# Patient Record
Sex: FEMALE | Race: WHITE | NOT HISPANIC OR LATINO | Employment: OTHER | URBAN - NONMETROPOLITAN AREA
[De-identification: names, ages, dates, MRNs, and addresses within clinical notes are randomized per-mention and may not be internally consistent; named-entity substitution may affect disease eponyms.]

---

## 2017-01-11 ENCOUNTER — OFFICE VISIT (OUTPATIENT)
Dept: SURGERY | Facility: CLINIC | Age: 77
End: 2017-01-11

## 2017-01-11 VITALS
DIASTOLIC BLOOD PRESSURE: 76 MMHG | BODY MASS INDEX: 24.11 KG/M2 | WEIGHT: 150 LBS | HEIGHT: 66 IN | HEART RATE: 72 BPM | SYSTOLIC BLOOD PRESSURE: 110 MMHG

## 2017-01-11 DIAGNOSIS — K21.9 GASTROESOPHAGEAL REFLUX DISEASE WITHOUT ESOPHAGITIS: Primary | ICD-10-CM

## 2017-01-11 PROCEDURE — 99212 OFFICE O/P EST SF 10 MIN: CPT | Performed by: SURGERY

## 2017-01-11 NOTE — PROGRESS NOTES
Subjective   Tessa Fisher is a 76 y.o. female  is here today for follow-up.         Tessa Fisher is a 76 y.o. female here for follow-up after EGD.  The patient has a known large hiatal hernia with esophagitis.  She had stopped PPI therapy and esophagitis and symptoms recurred.  She will require lifelong PPI therapy.  Risks and benefits of surgery been discussed with patient and she is not willing to proceed with paraesophageal hernia repair which is reasonable at this time.            Tessa was seen today for egd post-op.    Diagnoses and all orders for this visit:    Gastroesophageal reflux disease without esophagitis        Assessment     76-year-old female with esophagitis and paraesophageal hernia.  She responded well to PPI therapy which has been resumed.  She will follow-up in 3 months.

## 2017-01-11 NOTE — MR AVS SNAPSHOT
"Tessa Fisher   1/11/2017 3:40 PM   Office Visit    Dept Phone:  [phone]   Encounter #:  [id number]    Provider:  Arun Phelps MD   Department:  [facility] GENERAL SURGERY                Your Full Care Plan              Your Updated Medication List          This list is accurate as of: 1/11/17  4:17 PM.  Always use your most recent med list.                pantoprazole 40 MG EC tablet   Commonly known as:  PROTONIX   Take 1 tablet by mouth Daily.               You Were Diagnosed With        Codes Comments    Gastroesophageal reflux disease without esophagitis    -  Primary ICD-10-CM: K21.9  ICD-9-CM: 530.81       Instructions     None    Patient Instructions History      Upcoming Appointments     Visit Type Date Time Department    POST-OP 1/11/2017  3:40 PM [facility]    FOLLOW UP 4/12/2017  1:10 PM [facility]      MyChart Signup     Our records indicate that you have declined [employer] signup. If you would like to sign up for [employer], please email [email or web address] or call [phone] to obtain an activation code.             Other Info from Your Visit           Your Appointments     Apr 12, 2017  1:10 PM EDT   Follow Up with Arun Phelps MD   [facility] GENERAL SURGERY (--)    [address] [place] [id number] [phone]           Arrive 15 minutes prior to appointment.              Allergies     No Known Allergies      Reason for Visit     EGD POST-OP           Vital Signs     Blood Pressure Pulse Height Weight Body Mass Index Smoking Status    110/76 72 66\" (167.6 cm) 150 lb (68 kg) 24.21 kg/m2 Former Smoker      Problems and Diagnoses Noted     Acid reflux disease        "

## 2017-02-08 ENCOUNTER — OFFICE VISIT (OUTPATIENT)
Dept: SURGERY | Facility: CLINIC | Age: 77
End: 2017-02-08

## 2017-02-08 VITALS
HEART RATE: 84 BPM | SYSTOLIC BLOOD PRESSURE: 115 MMHG | HEIGHT: 66 IN | WEIGHT: 150 LBS | DIASTOLIC BLOOD PRESSURE: 80 MMHG | BODY MASS INDEX: 24.11 KG/M2

## 2017-02-08 DIAGNOSIS — M79.604 RIGHT LEG PAIN: Primary | ICD-10-CM

## 2017-02-08 PROCEDURE — 99212 OFFICE O/P EST SF 10 MIN: CPT | Performed by: SURGERY

## 2017-02-08 NOTE — PROGRESS NOTES
Subjective   Tessa Fisher is a 76 y.o. female  is here today for follow-up.         Tessa Fisher is a 76 y.o. female here for follow-up regarding reflux that is improved with PPI therapy.  She does not however she twisted her right knee the other day and is having continued pain in the muscles of the lateral aspect of the knee.  There is no knee instability on examination.  The anterior and posterior drawer signs are negative for instability.  The patient has bilateral lower extremity edema that is stable.        Tessa was seen today for right lower extremity swelling.    Diagnoses and all orders for this visit:    Right leg pain        Assessment     76-year-old female with right lower [place] to pain.  She is been advised to elevate the lower extremity when not ambulating and initiate anti-inflammatory therapy.  She'll follow-up in 2 weeks.

## 2017-02-22 ENCOUNTER — OFFICE VISIT (OUTPATIENT)
Dept: SURGERY | Facility: CLINIC | Age: 77
End: 2017-02-22

## 2017-02-22 VITALS — HEIGHT: 66 IN | BODY MASS INDEX: 24.11 KG/M2 | WEIGHT: 150 LBS

## 2017-02-22 DIAGNOSIS — K21.9 GASTROESOPHAGEAL REFLUX DISEASE WITHOUT ESOPHAGITIS: Primary | ICD-10-CM

## 2017-02-22 DIAGNOSIS — M79.604 RIGHT LEG PAIN: ICD-10-CM

## 2017-02-22 PROCEDURE — 99212 OFFICE O/P EST SF 10 MIN: CPT | Performed by: SURGERY

## 2017-02-22 RX ORDER — PANTOPRAZOLE SODIUM 40 MG/1
40 TABLET, DELAYED RELEASE ORAL DAILY
Qty: 30 TABLET | Refills: 1 | Status: SHIPPED | OUTPATIENT
Start: 2017-02-22 | End: 2017-04-12 | Stop reason: ALTCHOICE

## 2017-02-22 NOTE — PROGRESS NOTES
Subjective   Tessa Fisher is a 76 y.o. female  is here today for follow-up.         Tessa Fisher is a 76 y.o. female here for follow up regarding her right leg pain and swelling.  The swelling has improved somewhat.  I believe this is likely due to venous stasis as the swelling is worse through the day but resolves at night when her legs are elevated.  The patient right lateral knee pain has not worsened but is still present.  There is no evidence of underlying injury and I have offered the patient referral to orthopedic surgery and she would like to defer at this time.    General Appearance:  awake, alert, oriented, in no acute distress  Lungs:  Normal expansion.  Clear to auscultation.  No rales, rhonchi, or wheezing.  Heart:  Heart regular rate and rhythm  Abdomen:  Soft, non-tender, normal bowel sounds; no bruits, organomegaly or masses.  Extremities: Extremities warm to touch, pink, with no edema.  Musculoskeletal:  Pain and swelling improved of the right lower extremity but the patient still complains of discomfort.  No gross deformities.  Anterior and posterior drawer signs negative.          Tessa was seen today for right lower extremity swelling.    Diagnoses and all orders for this visit:    Gastroesophageal reflux disease without esophagitis    Right leg pain    Other orders  -     pantoprazole (PROTONIX) 40 MG EC tablet; Take 1 tablet by mouth Daily.        Assessment     Tessa Fisher is a 76 y.o. female with improvement of her right leg pain.  She will continue rest at this time and follow-up as needed.  I will refill her PPI prescription.

## 2017-04-12 ENCOUNTER — OFFICE VISIT (OUTPATIENT)
Dept: SURGERY | Facility: CLINIC | Age: 77
End: 2017-04-12

## 2017-04-12 VITALS — BODY MASS INDEX: 24.11 KG/M2 | HEIGHT: 66 IN | WEIGHT: 150 LBS

## 2017-04-12 DIAGNOSIS — K21.9 GASTROESOPHAGEAL REFLUX DISEASE WITHOUT ESOPHAGITIS: Primary | ICD-10-CM

## 2017-04-12 PROCEDURE — 99213 OFFICE O/P EST LOW 20 MIN: CPT | Performed by: SURGERY

## 2017-04-12 RX ORDER — FAMOTIDINE 20 MG/1
20 TABLET, FILM COATED ORAL 2 TIMES DAILY
Qty: 60 TABLET | Refills: 1 | Status: ON HOLD | OUTPATIENT
Start: 2017-04-12 | End: 2017-04-16

## 2017-04-12 NOTE — PROGRESS NOTES
Subjective   Tessa Fisher is a 76 y.o. female  is here today for follow-up.         Tessa Fisher is a 76 y.o. female here for follow up for GERD.  She is improved with Protonix therapy but states the medication is causing swelling in her hands and feet.  She'll be converted to Pepcid twice daily and follow-up in 3 months.  With regards to her lower extremity she has no further pain of the right knee.  On examination she has mild right lower extremity edema and mild left lower extremity edema.  Pedal pulses are palpable.      Tessa was seen today for gerd follow up.    Diagnoses and all orders for this visit:    Gastroesophageal reflux disease without esophagitis    Other orders  -     famotidine (PEPCID) 20 MG tablet; Take 1 tablet by mouth 2 (Two) Times a Day.        Assessment     Tessa Fisher is a 76 y.o. female with GERD that has improved with medical management.  She does not like the side effects of the Protonix and she will be converted to Pepcid twice a day.  She will follow-up in 3 months.

## 2017-04-16 ENCOUNTER — APPOINTMENT (OUTPATIENT)
Dept: GENERAL RADIOLOGY | Facility: HOSPITAL | Age: 77
End: 2017-04-16

## 2017-04-16 ENCOUNTER — HOSPITAL ENCOUNTER (OUTPATIENT)
Facility: HOSPITAL | Age: 77
Setting detail: OBSERVATION
Discharge: HOME | End: 2017-04-18
Attending: EMERGENCY MEDICINE | Admitting: INTERNAL MEDICINE

## 2017-04-16 ENCOUNTER — APPOINTMENT (OUTPATIENT)
Dept: CT IMAGING | Facility: HOSPITAL | Age: 77
End: 2017-04-16

## 2017-04-16 DIAGNOSIS — R55 NEAR SYNCOPE: Primary | ICD-10-CM

## 2017-04-16 PROBLEM — R53.1 GENERALIZED WEAKNESS: Status: ACTIVE | Noted: 2017-04-16

## 2017-04-16 LAB
ALBUMIN SERPL-MCNC: 4.4 G/DL (ref 3.4–4.8)
ALBUMIN/GLOB SERPL: 1.5 G/DL (ref 1.5–2.5)
ALP SERPL-CCNC: 87 U/L (ref 35–104)
ALT SERPL W P-5'-P-CCNC: 28 U/L (ref 10–36)
AMPHET+METHAMPHET UR QL: NEGATIVE
ANION GAP SERPL CALCULATED.3IONS-SCNC: 5.1 MMOL/L (ref 3.6–11.2)
APTT PPP: 23.8 SECONDS (ref 24.4–31)
AST SERPL-CCNC: 29 U/L (ref 10–30)
BACTERIA UR QL AUTO: ABNORMAL /HPF
BARBITURATES UR QL SCN: NEGATIVE
BASOPHILS # BLD AUTO: 0.04 10*3/MM3 (ref 0–0.3)
BASOPHILS NFR BLD AUTO: 0.4 % (ref 0–2)
BENZODIAZ UR QL SCN: NEGATIVE
BILIRUB SERPL-MCNC: 0.5 MG/DL (ref 0.2–1.8)
BILIRUB UR QL STRIP: NEGATIVE
BNP SERPL-MCNC: 36 PG/ML (ref 0–100)
BUN BLD-MCNC: 16 MG/DL (ref 7–21)
BUN/CREAT SERPL: 23.5 (ref 7–25)
CALCIUM SPEC-SCNC: 9.9 MG/DL (ref 7.7–10)
CANNABINOIDS SERPL QL: NEGATIVE
CHLORIDE SERPL-SCNC: 106 MMOL/L (ref 99–112)
CK MB SERPL-CCNC: 2.61 NG/ML (ref 0–5)
CK MB SERPL-CCNC: 2.73 NG/ML (ref 0–5)
CK MB SERPL-RTO: 2.9 % (ref 0–3)
CK MB SERPL-RTO: 3 % (ref 0–3)
CK SERPL-CCNC: 86 U/L (ref 24–173)
CK SERPL-CCNC: 95 U/L (ref 24–173)
CLARITY UR: CLEAR
CO2 SERPL-SCNC: 28.9 MMOL/L (ref 24.3–31.9)
COCAINE UR QL: NEGATIVE
COLOR UR: YELLOW
CREAT BLD-MCNC: 0.68 MG/DL (ref 0.43–1.29)
CRP SERPL-MCNC: <0.5 MG/DL (ref 0–0.99)
D-LACTATE SERPL-SCNC: 1 MMOL/L (ref 0.5–2)
DEPRECATED RDW RBC AUTO: 45.1 FL (ref 37–54)
DEVELOPER EXPIRATION DATE: NORMAL
DEVELOPER LOT NUMBER: NORMAL
EOSINOPHIL # BLD AUTO: 0.03 10*3/MM3 (ref 0–0.7)
EOSINOPHIL NFR BLD AUTO: 0.3 % (ref 0–7)
ERYTHROCYTE [DISTWIDTH] IN BLOOD BY AUTOMATED COUNT: 13.7 % (ref 11.5–14.5)
EXPIRATION DATE: NORMAL
FECAL OCCULT BLOOD SCREEN, POC: NEGATIVE
GFR SERPL CREATININE-BSD FRML MDRD: 84 ML/MIN/1.73
GLOBULIN UR ELPH-MCNC: 3 GM/DL
GLUCOSE BLD-MCNC: 115 MG/DL (ref 70–110)
GLUCOSE UR STRIP-MCNC: NEGATIVE MG/DL
HBA1C MFR BLD: 5.7 % (ref 4.5–5.7)
HCT VFR BLD AUTO: 41.1 % (ref 37–47)
HGB BLD-MCNC: 13.2 G/DL (ref 12–16)
HGB UR QL STRIP.AUTO: NEGATIVE
HYALINE CASTS UR QL AUTO: ABNORMAL /LPF
IMM GRANULOCYTES # BLD: 0.02 10*3/MM3 (ref 0–0.03)
IMM GRANULOCYTES NFR BLD: 0.2 % (ref 0–0.5)
INR PPP: 0.94 (ref 0.8–1.1)
KETONES UR QL STRIP: NEGATIVE
LEUKOCYTE ESTERASE UR QL STRIP.AUTO: ABNORMAL
LYMPHOCYTES # BLD AUTO: 1.13 10*3/MM3 (ref 1–3)
LYMPHOCYTES NFR BLD AUTO: 12.3 % (ref 16–46)
Lab: NORMAL
MAGNESIUM SERPL-MCNC: 2.2 MG/DL (ref 1.7–2.6)
MCH RBC QN AUTO: 29.5 PG (ref 27–33)
MCHC RBC AUTO-ENTMCNC: 32.1 G/DL (ref 33–37)
MCV RBC AUTO: 91.9 FL (ref 80–94)
METHADONE UR QL SCN: NEGATIVE
MONOCYTES # BLD AUTO: 0.45 10*3/MM3 (ref 0.1–0.9)
MONOCYTES NFR BLD AUTO: 4.9 % (ref 0–12)
MYOGLOBIN SERPL-MCNC: 49 NG/ML (ref 0–109)
MYOGLOBIN SERPL-MCNC: 53 NG/ML (ref 0–109)
NEGATIVE CONTROL: NEGATIVE
NEUTROPHILS # BLD AUTO: 7.5 10*3/MM3 (ref 1.4–6.5)
NEUTROPHILS NFR BLD AUTO: 81.9 % (ref 40–75)
NITRITE UR QL STRIP: NEGATIVE
OPIATES UR QL: NEGATIVE
OSMOLALITY SERPL CALC.SUM OF ELEC: 281.5 MOSM/KG (ref 273–305)
PCP UR QL SCN: NEGATIVE
PH UR STRIP.AUTO: 8 [PH] (ref 5–8)
PLATELET # BLD AUTO: 224 10*3/MM3 (ref 130–400)
PMV BLD AUTO: 9.9 FL (ref 6–10)
POSITIVE CONTROL: POSITIVE
POTASSIUM BLD-SCNC: 3.7 MMOL/L (ref 3.5–5.3)
PROPOXYPH UR QL: NEGATIVE
PROT SERPL-MCNC: 7.4 G/DL (ref 6–8)
PROT UR QL STRIP: NEGATIVE
PROTHROMBIN TIME: 10.4 SECONDS (ref 9.8–11.9)
RBC # BLD AUTO: 4.47 10*6/MM3 (ref 4.2–5.4)
RBC # UR: ABNORMAL /HPF
REF LAB TEST METHOD: ABNORMAL
SODIUM BLD-SCNC: 140 MMOL/L (ref 135–153)
SP GR UR STRIP: 1.01 (ref 1–1.03)
SQUAMOUS #/AREA URNS HPF: ABNORMAL /HPF
T4 FREE SERPL-MCNC: 1.1 NG/DL (ref 0.89–1.76)
TROPONIN I SERPL-MCNC: <0.006 NG/ML
TSH SERPL DL<=0.05 MIU/L-ACNC: 3.22 MIU/ML (ref 0.55–4.78)
UROBILINOGEN UR QL STRIP: ABNORMAL
WBC NRBC COR # BLD: 9.17 10*3/MM3 (ref 4.5–12.5)
WBC UR QL AUTO: ABNORMAL /HPF

## 2017-04-16 PROCEDURE — 80307 DRUG TEST PRSMV CHEM ANLYZR: CPT | Performed by: PHYSICIAN ASSISTANT

## 2017-04-16 PROCEDURE — G0378 HOSPITAL OBSERVATION PER HR: HCPCS

## 2017-04-16 PROCEDURE — 84439 ASSAY OF FREE THYROXINE: CPT | Performed by: EMERGENCY MEDICINE

## 2017-04-16 PROCEDURE — 96361 HYDRATE IV INFUSION ADD-ON: CPT

## 2017-04-16 PROCEDURE — 71010 HC CHEST PA OR AP: CPT

## 2017-04-16 PROCEDURE — 83605 ASSAY OF LACTIC ACID: CPT | Performed by: PHYSICIAN ASSISTANT

## 2017-04-16 PROCEDURE — 85025 COMPLETE CBC W/AUTO DIFF WBC: CPT | Performed by: EMERGENCY MEDICINE

## 2017-04-16 PROCEDURE — 71010 XR CHEST 1 VW: CPT | Performed by: RADIOLOGY

## 2017-04-16 PROCEDURE — 96372 THER/PROPH/DIAG INJ SC/IM: CPT

## 2017-04-16 PROCEDURE — 85610 PROTHROMBIN TIME: CPT | Performed by: EMERGENCY MEDICINE

## 2017-04-16 PROCEDURE — 82553 CREATINE MB FRACTION: CPT | Performed by: PHYSICIAN ASSISTANT

## 2017-04-16 PROCEDURE — 84484 ASSAY OF TROPONIN QUANT: CPT | Performed by: EMERGENCY MEDICINE

## 2017-04-16 PROCEDURE — 84484 ASSAY OF TROPONIN QUANT: CPT | Performed by: PHYSICIAN ASSISTANT

## 2017-04-16 PROCEDURE — 87040 BLOOD CULTURE FOR BACTERIA: CPT | Performed by: PHYSICIAN ASSISTANT

## 2017-04-16 PROCEDURE — 25010000002 HEPARIN (PORCINE) PER 1000 UNITS: Performed by: PHYSICIAN ASSISTANT

## 2017-04-16 PROCEDURE — 82550 ASSAY OF CK (CPK): CPT | Performed by: PHYSICIAN ASSISTANT

## 2017-04-16 PROCEDURE — 83735 ASSAY OF MAGNESIUM: CPT | Performed by: PHYSICIAN ASSISTANT

## 2017-04-16 PROCEDURE — 99220 PR INITIAL OBSERVATION CARE/DAY 70 MINUTES: CPT | Performed by: INTERNAL MEDICINE

## 2017-04-16 PROCEDURE — 85730 THROMBOPLASTIN TIME PARTIAL: CPT | Performed by: EMERGENCY MEDICINE

## 2017-04-16 PROCEDURE — 83874 ASSAY OF MYOGLOBIN: CPT | Performed by: PHYSICIAN ASSISTANT

## 2017-04-16 PROCEDURE — 93005 ELECTROCARDIOGRAM TRACING: CPT | Performed by: EMERGENCY MEDICINE

## 2017-04-16 PROCEDURE — 83036 HEMOGLOBIN GLYCOSYLATED A1C: CPT | Performed by: PHYSICIAN ASSISTANT

## 2017-04-16 PROCEDURE — 80053 COMPREHEN METABOLIC PANEL: CPT | Performed by: EMERGENCY MEDICINE

## 2017-04-16 PROCEDURE — 70450 CT HEAD/BRAIN W/O DYE: CPT

## 2017-04-16 PROCEDURE — 86140 C-REACTIVE PROTEIN: CPT | Performed by: PHYSICIAN ASSISTANT

## 2017-04-16 PROCEDURE — 83880 ASSAY OF NATRIURETIC PEPTIDE: CPT | Performed by: EMERGENCY MEDICINE

## 2017-04-16 PROCEDURE — 99284 EMERGENCY DEPT VISIT MOD MDM: CPT

## 2017-04-16 PROCEDURE — 84443 ASSAY THYROID STIM HORMONE: CPT | Performed by: EMERGENCY MEDICINE

## 2017-04-16 PROCEDURE — 81001 URINALYSIS AUTO W/SCOPE: CPT | Performed by: EMERGENCY MEDICINE

## 2017-04-16 PROCEDURE — 70450 CT HEAD/BRAIN W/O DYE: CPT | Performed by: RADIOLOGY

## 2017-04-16 RX ORDER — PANTOPRAZOLE SODIUM 40 MG/1
40 TABLET, DELAYED RELEASE ORAL
Status: DISCONTINUED | OUTPATIENT
Start: 2017-04-16 | End: 2017-04-16

## 2017-04-16 RX ORDER — PANTOPRAZOLE SODIUM 40 MG/1
40 TABLET, DELAYED RELEASE ORAL 2 TIMES DAILY
COMMUNITY
End: 2017-10-06

## 2017-04-16 RX ORDER — FAMOTIDINE 20 MG/1
20 TABLET, FILM COATED ORAL EVERY 12 HOURS SCHEDULED
Status: DISCONTINUED | OUTPATIENT
Start: 2017-04-16 | End: 2017-04-18 | Stop reason: HOSPADM

## 2017-04-16 RX ORDER — SODIUM CHLORIDE 0.9 % (FLUSH) 0.9 %
1-10 SYRINGE (ML) INJECTION AS NEEDED
Status: DISCONTINUED | OUTPATIENT
Start: 2017-04-16 | End: 2017-04-18 | Stop reason: HOSPADM

## 2017-04-16 RX ORDER — NITROGLYCERIN 0.4 MG/1
0.4 TABLET SUBLINGUAL
Status: DISCONTINUED | OUTPATIENT
Start: 2017-04-16 | End: 2017-04-18 | Stop reason: HOSPADM

## 2017-04-16 RX ORDER — HEPARIN SODIUM 5000 [USP'U]/ML
5000 INJECTION, SOLUTION INTRAVENOUS; SUBCUTANEOUS EVERY 12 HOURS SCHEDULED
Status: DISCONTINUED | OUTPATIENT
Start: 2017-04-16 | End: 2017-04-18 | Stop reason: HOSPADM

## 2017-04-16 RX ADMIN — FAMOTIDINE 20 MG: 20 TABLET, FILM COATED ORAL at 20:03

## 2017-04-16 RX ADMIN — SODIUM CHLORIDE 1000 ML: 9 INJECTION, SOLUTION INTRAVENOUS at 10:51

## 2017-04-16 RX ADMIN — HEPARIN SODIUM 5000 UNITS: 5000 INJECTION, SOLUTION INTRAVENOUS; SUBCUTANEOUS at 20:03

## 2017-04-16 NOTE — H&P
[facility] HOSPITALIST HISTORY AND PHYSICAL    Patient Identification:  Name:  Tessa Fisher  Age:  76 y.o.  Sex:  female  :  1940  MRN:  [id number]   Visit Number:  [id number]  Primary Care Physician:  Arun Phelps MD     I have seen the patient in conjunction with Kiley Turner PA-C and I agree with the following statements:    Chief complaint:   Weakness, acute onset    History of presenting illness:   Patient is a 76 y.o. female with past medical history significant for GERD that presented to the [facility] Emergency Department for evaluation of acute onset of weakness. Patient states that onset of symptoms was approximately 7 AM this morning upon waking. Patient states that when she woke up this morning she was severely weak and could not get out of bed. She states it took her almost an hour to get out of her bed to sit on the bedside. She states it took her another hour to get from her bed to her living room where she had to sit and rest. She did report diaphoresis upon waking up and states that she was drenched in sweat. She states that yesterday she was fine and had no health complaints. She states she is very independent. She states she works part time at least three days per week as a  and is on her feet all day. She states that when she is not at work she works at her house in her yard and mows and weed eats her lawn by herself. She states that she does have lower extremity edema, right worse than left that is chronic from being on her feet a lot. She states she wears compression stockings occasionally. She states that the only medical problem she has is GERD and hiatal hernia. She states that her abdomen swells as the day progresses, which has been chronic. She states that she takes acid reflux medicine, which has never resolved her symptoms. She denies shortness of breath. Denies chest pain and/or pressure. She denies nausea, vomiting, or diarrhea.  She denies urinary symptoms. She denies ill contacts. Patient does report that she has recently been taken off of Protonix as it made her hands and feet swell, she has been switched to Pepcid, but has not started the new medication.  Patient's daughter is at bedside and states that this morning the patient was sluggish and speech was slowed compared to the patient's baseline.  She has noticed clear rhinorrhea for 4 weeks that she thought was sinus drainage; she had never had problems with allergies before.    Upon arrival to the ED, vitals were temp 94.7 F, HR 55, RR 16, /78, and SpO2 100% on room air. Initial troponin negative BNP WNL. CMP with glucose 115, otherwise WNL. Hemoglobin A1C 5.70. TSH and free T4 WNL. CBC with neutrophil % of 81.9%, otherwise WNL. Chest Xray with no evidence of consolidations or effusions. Head CT negative for any acute changes. EKG with sinus bradycardia and LBBB, QTc prolongation of 463 m/s.     Patient has been admitted to the telemetry unit for further evaluation and treatment.   ---------------------------------------------------------------------------------------------------------------------   Review of Systems   Constitutional: Positive for chills and diaphoresis. Negative for fever.   HENT: Positive for postnasal drip and rhinorrhea. Negative for congestion, sinus pressure, sneezing and sore throat.    Eyes: Positive for discharge. Negative for pain and visual disturbance.   Respiratory: Negative for apnea, cough, chest tightness, shortness of breath and wheezing.    Cardiovascular: Positive for leg swelling. Negative for chest pain and palpitations.   Gastrointestinal: Positive for abdominal distention. Negative for abdominal pain, blood in stool, constipation, diarrhea, nausea and vomiting.   Endocrine: Positive for cold intolerance. Negative for polyphagia and polyuria.   Genitourinary: Negative for dysuria, flank pain, frequency, hematuria and urgency.    Musculoskeletal: Negative for back pain and myalgias.   Skin: Negative for color change, pallor, rash and wound.   Allergic/Immunologic: Negative for environmental allergies and immunocompromised state.   Neurological: Positive for weakness. Negative for dizziness, tremors, seizures, syncope, facial asymmetry, speech difficulty, light-headedness, numbness and headaches.   Hematological: Negative for adenopathy. Does not bruise/bleed easily.   Psychiatric/Behavioral: Negative for confusion and decreased concentration. The patient is not nervous/anxious.     ---------------------------------------------------------------------------------------------------------------------   Past Medical History:   Diagnosis Date   • GERD (gastroesophageal reflux disease)    • Hiatal hernia    • LBBB (left bundle branch block)    • Melanoma      Past Surgical History:   Procedure Laterality Date   • APPENDECTOMY     • COLONOSCOPY  1990   • ENDOSCOPY N/A 12/30/2016    Procedure: ESOPHAGOGASTRODUODENOSCOPY WITH ANESTHESIA;  Surgeon: Arun Phelps MD;  Location: [facility];  Service:    • ESOPHAGOGASTRECTOMY     • SKIN CANCER EXCISION       Family History   Problem Relation Age of Onset   • Cancer Mother      Melanoma   • Diabetes Neg Hx    • Heart disease Neg Hx    • Hypertension Neg Hx      Social History   • Marital status:      Social History Main Topics   • Smoking status: Former Smoker     Packs/day: 3.00     Years: 25.00     Quit date: 1975   • Smokeless tobacco: None   • Alcohol use No   • Drug use: No   ---------------------------------------------------------------------------------------------------------------------   Allergies:  Review of patient's allergies indicates no known allergies.  ---------------------------------------------------------------------------------------------------------------------   Prior to Admission Medications     Prescriptions Last Dose Informant Patient Reported? Taking?     famotidine (PEPCID) 20 MG tablet   No No    Take 1 tablet by mouth 2 (Two) Times a Day.      ---------------------------------------------------------------------------------------------------------------------   Vital Signs:  Temp:  [94.7 °F (34.8 °C)-97.9 °F (36.6 °C)] 97.5 °F (36.4 °C)  Heart Rate:  [55-76] 60  Resp:  [16-20] 20  BP: (118-149)/(61-91) 132/67  Last 3 weights    04/16/17  1018 04/16/17  1524   Weight: 160 lb (72.6 kg) 164 lb 6.4 oz (74.6 kg)     Body mass index is 26.53 kg/(m^2).     SpO2 Readings from Last 3 Encounters:   04/16/17 95%   12/30/16 98%   ---------------------------------------------------------------------------------------------------------------------   Physical Exam:  Constitutional:  Well-developed and well-nourished.  No respiratory distress.      HENT:  Head: Normocephalic and atraumatic.  Mouth:  Moist mucous membranes.    Eyes:  Conjunctivae and EOM are normal.  Pupils are equal, round, and reactive to light.  No scleral icterus.  Neck:  Neck supple.  No JVD present.    Cardiovascular:  Normal rate, regular rhythm and normal heart sounds with no murmur.  Pulmonary/Chest:  No respiratory distress, no wheezes, no crackles, with normal breath sounds and good air movement.  Abdominal:  Soft.  Bowel sounds are normal.  No distension. Mild tenderness to palpation of the left upper quadrant and epigastric region without guarding.    Musculoskeletal:  No edema, no tenderness, and no deformity.  No red or swollen joints anywhere.    Neurological:  Alert and oriented to person, place, and time.  No cranial nerve deficit.  No tongue deviation.  No facial droop.  No slurred speech.   Skin:  Skin is warm and dry.  No rash noted.  No pallor.   Psychiatric:  Normal mood and affect.  Behavior is normal.  Judgment and thought content normal.   Peripheral vascular:  Strong pulses on all 4 extremities. Chronic venous stasis changes noted bilaterally, right lower extremity for edematous than the  left, no erythema, warmth, or cellulitis noted. No pitting edema noted.   Genitourinary: Making urine, no sands catheter in place.   ---------------------------------------------------------------------------------------------------------------------  EKG:  I have personally looked at the EKG and read the final cardiology report.       Telemetry:  Sinus 60s, LBBB  I have personally reviewed the EKG/Telemetry strip  ---------------------------------------------------------------------------------------------------------------------   Results from last 7 days  Lab Units 04/16/17  1047   WBC 10*3/mm3 9.17   HEMOGLOBIN g/dL 13.2   HEMATOCRIT % 41.1   MCV fL 91.9   MCHC g/dL 32.1*   PLATELETS 10*3/mm3 224   INR  0.94     Results from last 7 days  Lab Units 04/16/17  1047   SODIUM mmol/L 140   POTASSIUM mmol/L 3.7   CHLORIDE mmol/L 106   TOTAL CO2 mmol/L 28.9   BUN mg/dL 16   CREATININE mg/dL 0.68   EGFR IF NONAFRICN AM mL/min/1.73 84   CALCIUM mg/dL 9.9   GLUCOSE mg/dL 115*   ALBUMIN g/dL 4.40   BILIRUBIN mg/dL 0.5   ALK PHOS U/L 87   AST (SGOT) U/L 29   ALT (SGPT) U/L 28   Estimated Creatinine Clearance: 62.7 mL/min (by C-G formula based on Cr of 0.68).    Results from last 7 days  Lab Units 04/16/17  1047   TROPONIN I ng/mL <0.006       Lab Results   Component Value Date    HGBA1C 5.70 04/16/2017     Lab Results   Component Value Date    TSH 3.224 04/16/2017    FREET4 1.10 04/16/2017            ---------------------------------------------------------------------------------------------------------------------  Imaging Results (last 7 days)     Procedure Component Value Units Date/Time    XR Chest 1 View [[id number]] Collected:  04/16/17 1240     Updated:  04/16/17 1240    FINDINGS:    The cardiac silhouette is normal in size and shape. The lungs are mildly hyperinflated but otherwise clear. There are no pleural effusions or signs of heart failure.    Impression:       No evidence of active disease in the chest.         CT Head Without Contrast [[id number]]   Updated:  04/16/17 2831      I have personally reviewed the above radiology results.   ---------------------------------------------------------------------------------------------------------------------  Assessment and Plan:  -Symptomatic bradycardia of unknown etiology  -Generalized weakness, acute onset   -GERD  -Hiatal hernia   -Mild hyperglycemia with hemoglobin A1C 5.7  -Left bundle branch block, old (first diagnosed in 2008)  -Former smoker     Patient has been admitted to the telemetry floor for further evaluation and treatment. Continue to trend cardiac isoenzymes X 3. Echo ordered. Lipid panel ordered for in the morning. Carotid dopplers ordered bilaterally. I have ordered CRP, lactic acid, and blood cultures X 2 to rule out any source of infection as cause of the patient's symptoms. Continue monitoring the patient on telemetry. Repeat CBC and CMP in the morning.  I will order orthostatic vital signs.    I do not know what the underlying disease process is, but a viral illness could explain all of her symptoms.  The psychomotor slowness this morning could be due to a seizure but this would not explain the low temperature.     DVT Prophylaxis: SQ Heparin     The patient is considered to be a high risk patient due to: Bradycardia, acute onset of generalized weakness, former smoker.     I have discussed the patient's assessment and plan with the patient.     RHONDA Briones  04/16/17  4:25 PM     Ash Ly MD  04/16/17  7:36 PM  ---------------------------------------------------------------------------------------------------------------------

## 2017-04-16 NOTE — ED PROVIDER NOTES
Subjective   Patient is a 76 y.o. female presenting with weakness.   Weakness - Generalized   Severity:  Moderate  Onset quality:  Gradual  Duration:  3 hours  Timing:  Constant  Progression:  Unchanged  Chronicity:  New  Context: not urinary tract infection    Context comment:  This patient upon awaking this morning felt very weak all over she said she could barely get out of bed and could hardly walk she is so weak she does not have any fever although she did have an episode of diaphoresis.  No chest pain or shortness of breath.  Relieved by:  Nothing  Worsened by:  Standing and activity  Ineffective treatments:  None tried  Associated symptoms: fever (patient felt she may have had a fever because she did have an episode of diaphoresis)    Associated symptoms: no abdominal pain, no arthralgias, no chest pain, no diarrhea, no dizziness, no dysuria, no myalgias, no nausea, no shortness of breath and no vomiting        Review of Systems   Constitutional: Positive for fever (patient felt she may have had a fever because she did have an episode of diaphoresis). Negative for activity change.   HENT: Negative.  Negative for trouble swallowing.    Eyes: Negative for discharge.   Respiratory: Negative for shortness of breath, wheezing and stridor.    Cardiovascular: Negative for chest pain.   Gastrointestinal: Negative for abdominal pain, diarrhea, nausea and vomiting.   Genitourinary: Negative for difficulty urinating, dysuria and flank pain.   Musculoskeletal: Negative for arthralgias and myalgias.   Skin: Negative for rash and wound.   Neurological: Negative for dizziness.   Psychiatric/Behavioral: Negative for agitation.   All other systems reviewed and are negative.      Past Medical History:   Diagnosis Date   • Acid reflux    • Melanoma        No Known Allergies    Past Surgical History:   Procedure Laterality Date   • APPENDECTOMY     • COLONOSCOPY  1990   • ENDOSCOPY N/A 12/30/2016    Procedure:  ESOPHAGOGASTRODUODENOSCOPY WITH ANESTHESIA;  Surgeon: Arun Phelps MD;  Location: [facility] OR;  Service:    • ESOPHAGOGASTRECTOMY     • SKIN CANCER EXCISION         Family History   Problem Relation Age of Onset   • Cancer Mother      Melanoma   • Diabetes Neg Hx    • Heart disease Neg Hx    • Hypertension Neg Hx        Social History     Social History   • Marital status:      Spouse name: N/A   • Number of children: N/A   • Years of education: N/A     Social History Main Topics   • Smoking status: Former Smoker     Packs/day: 3.00     Years: 25.00     Quit date: 1975   • Smokeless tobacco: None   • Alcohol use No   • Drug use: No   • Sexual activity: Defer     Other Topics Concern   • None     Social History Narrative           Objective   Physical Exam   Constitutional: She is oriented to person, place, and time. She appears well-developed and well-nourished.   HENT:   Head: Normocephalic.   Right Ear: External ear normal.   Left Ear: External ear normal.   Nose: Nose normal.   Mouth/Throat: Oropharynx is clear and moist.   Eyes: EOM are normal. Pupils are equal, round, and reactive to light.   Neck: Normal range of motion. Neck supple. No thyromegaly present.   Cardiovascular: Normal rate, regular rhythm, normal heart sounds and intact distal pulses.    Pulmonary/Chest: Effort normal and breath sounds normal. No respiratory distress. She has no wheezes. She has no rales.   Abdominal: Soft. She exhibits no distension. There is no tenderness. There is no rebound and no guarding.   Musculoskeletal: Normal range of motion. She exhibits no edema or tenderness.   Neurological: She is alert and oriented to person, place, and time. She has normal reflexes. No cranial nerve deficit.   Skin: Skin is warm and dry. No rash noted.   Psychiatric: She has a normal mood and affect. Her behavior is normal. Judgment and thought content normal.   Nursing note and vitals reviewed.      Procedures         ED Course  ED  Course   Comment By Time   This patient had a near-syncope episode that lasted for a while she was always in fear of passing out.  Going to go ahead and admit her for observation I spoke to Dr. Cabrales we will put her on telemetry Mikal Sandoval MD 04/16 1518                  MDM  Number of Diagnoses or Management Options  Near syncope:   Patient Progress  Patient progress: stable      Final diagnoses:   Near syncope            Mikal Sandoval MD  04/16/17 5445

## 2017-04-16 NOTE — PLAN OF CARE
Problem: Fall Risk (Adult)  Goal: Identify Related Risk Factors and Signs and Symptoms  Outcome: Ongoing (interventions implemented as appropriate)    04/16/17 9641   Fall Risk   Fall Risk: Related Risk Factors age-related changes       Goal: Absence of Falls  Outcome: Ongoing (interventions implemented as appropriate)

## 2017-04-17 ENCOUNTER — APPOINTMENT (OUTPATIENT)
Dept: CARDIOLOGY | Facility: HOSPITAL | Age: 77
End: 2017-04-17
Attending: INTERNAL MEDICINE

## 2017-04-17 ENCOUNTER — APPOINTMENT (OUTPATIENT)
Dept: ULTRASOUND IMAGING | Facility: HOSPITAL | Age: 77
End: 2017-04-17

## 2017-04-17 LAB
ALBUMIN SERPL-MCNC: 3.7 G/DL (ref 3.4–4.8)
ALBUMIN/GLOB SERPL: 1.5 G/DL (ref 1.5–2.5)
ALP SERPL-CCNC: 69 U/L (ref 35–104)
ALT SERPL W P-5'-P-CCNC: 17 U/L (ref 10–36)
ANION GAP SERPL CALCULATED.3IONS-SCNC: 2.6 MMOL/L (ref 3.6–11.2)
AST SERPL-CCNC: 19 U/L (ref 10–30)
BASOPHILS # BLD AUTO: 0.04 10*3/MM3 (ref 0–0.3)
BASOPHILS NFR BLD AUTO: 0.6 % (ref 0–2)
BH CV ECHO MEAS - % IVS THICK: 40 %
BH CV ECHO MEAS - % LVPW THICK: 86.4 %
BH CV ECHO MEAS - ACS: 1.8 CM
BH CV ECHO MEAS - AO ROOT AREA (BSA CORRECTED): 1.4
BH CV ECHO MEAS - AO ROOT AREA: 5.1 CM^2
BH CV ECHO MEAS - AO ROOT DIAM: 2.5 CM
BH CV ECHO MEAS - BSA(HAYCOCK): 1.9 M^2
BH CV ECHO MEAS - BSA: 1.8 M^2
BH CV ECHO MEAS - BZI_BMI: 26.5 KILOGRAMS/M^2
BH CV ECHO MEAS - BZI_METRIC_HEIGHT: 167.6 CM
BH CV ECHO MEAS - BZI_METRIC_WEIGHT: 74.4 KG
BH CV ECHO MEAS - CONTRAST EF 4CH: 56.5 ML/M^2
BH CV ECHO MEAS - EDV(CUBED): 156.8 ML
BH CV ECHO MEAS - EDV(MOD-SP4): 46 ML
BH CV ECHO MEAS - EDV(TEICH): 140.9 ML
BH CV ECHO MEAS - EF(CUBED): 70.9 %
BH CV ECHO MEAS - EF(MOD-SP4): 56.5 %
BH CV ECHO MEAS - EF(TEICH): 62.1 %
BH CV ECHO MEAS - ESV(CUBED): 45.6 ML
BH CV ECHO MEAS - ESV(MOD-SP4): 20 ML
BH CV ECHO MEAS - ESV(TEICH): 53.5 ML
BH CV ECHO MEAS - FS: 33.7 %
BH CV ECHO MEAS - IVS/LVPW: 1.1
BH CV ECHO MEAS - IVSD: 0.83 CM
BH CV ECHO MEAS - IVSS: 1.2 CM
BH CV ECHO MEAS - LA DIMENSION: 3.4 CM
BH CV ECHO MEAS - LA/AO: 1.4
BH CV ECHO MEAS - LV DIASTOLIC VOL/BSA (35-75): 25 ML/M^2
BH CV ECHO MEAS - LV MASS(C)D: 149.1 GRAMS
BH CV ECHO MEAS - LV MASS(C)DI: 81.1 GRAMS/M^2
BH CV ECHO MEAS - LV MASS(C)S: 150.3 GRAMS
BH CV ECHO MEAS - LV MASS(C)SI: 81.8 GRAMS/M^2
BH CV ECHO MEAS - LV SYSTOLIC VOL/BSA (12-30): 10.9 ML/M^2
BH CV ECHO MEAS - LVIDD: 5.4 CM
BH CV ECHO MEAS - LVIDS: 3.6 CM
BH CV ECHO MEAS - LVLD AP4: 7.5 CM
BH CV ECHO MEAS - LVLS AP4: 6.1 CM
BH CV ECHO MEAS - LVOT AREA (M): 2.5 CM^2
BH CV ECHO MEAS - LVOT AREA: 2.7 CM^2
BH CV ECHO MEAS - LVOT DIAM: 1.8 CM
BH CV ECHO MEAS - LVPWD: 0.73 CM
BH CV ECHO MEAS - LVPWS: 1.4 CM
BH CV ECHO MEAS - MV A MAX VEL: 83.9 CM/SEC
BH CV ECHO MEAS - MV E MAX VEL: 67.1 CM/SEC
BH CV ECHO MEAS - MV E/A: 0.8
BH CV ECHO MEAS - PA ACC SLOPE: 1450 CM/SEC^2
BH CV ECHO MEAS - PA ACC TIME: 0.07 SEC
BH CV ECHO MEAS - PA PR(ACCEL): 45.7 MMHG
BH CV ECHO MEAS - RAP SYSTOLE: 10 MMHG
BH CV ECHO MEAS - RVDD: 1 CM
BH CV ECHO MEAS - RVSP: 27.7 MMHG
BH CV ECHO MEAS - SI(CUBED): 60.5 ML/M^2
BH CV ECHO MEAS - SI(MOD-SP4): 14.1 ML/M^2
BH CV ECHO MEAS - SI(TEICH): 47.6 ML/M^2
BH CV ECHO MEAS - SV(CUBED): 111.2 ML
BH CV ECHO MEAS - SV(MOD-SP4): 26 ML
BH CV ECHO MEAS - SV(TEICH): 87.4 ML
BH CV ECHO MEAS - TR MAX VEL: 210.4 CM/SEC
BILIRUB SERPL-MCNC: 0.5 MG/DL (ref 0.2–1.8)
BUN BLD-MCNC: 12 MG/DL (ref 7–21)
BUN/CREAT SERPL: 18.5 (ref 7–25)
CALCIUM SPEC-SCNC: 9 MG/DL (ref 7.7–10)
CHLORIDE SERPL-SCNC: 111 MMOL/L (ref 99–112)
CHOLEST SERPL-MCNC: 153 MG/DL (ref 0–200)
CK MB SERPL-CCNC: 2.19 NG/ML (ref 0–5)
CK MB SERPL-RTO: 2.7 % (ref 0–3)
CK SERPL-CCNC: 82 U/L (ref 24–173)
CO2 SERPL-SCNC: 26.4 MMOL/L (ref 24.3–31.9)
CORTIS SERPL-MCNC: 10.8 MCG/DL
CORTIS SERPL-MCNC: 29.7 MCG/DL
CORTIS SERPL-MCNC: 38.6 MCG/DL
CREAT BLD-MCNC: 0.65 MG/DL (ref 0.43–1.29)
CRP SERPL-MCNC: <0.5 MG/DL (ref 0–0.99)
DEPRECATED RDW RBC AUTO: 45.6 FL (ref 37–54)
EOSINOPHIL # BLD AUTO: 0.09 10*3/MM3 (ref 0–0.7)
EOSINOPHIL NFR BLD AUTO: 1.4 % (ref 0–7)
ERYTHROCYTE [DISTWIDTH] IN BLOOD BY AUTOMATED COUNT: 13.7 % (ref 11.5–14.5)
GFR SERPL CREATININE-BSD FRML MDRD: 89 ML/MIN/1.73
GLOBULIN UR ELPH-MCNC: 2.4 GM/DL
GLUCOSE BLD-MCNC: 100 MG/DL (ref 70–110)
HCT VFR BLD AUTO: 35.6 % (ref 37–47)
HDLC SERPL-MCNC: 53 MG/DL (ref 60–100)
HGB BLD-MCNC: 11.3 G/DL (ref 12–16)
IMM GRANULOCYTES # BLD: 0 10*3/MM3 (ref 0–0.03)
IMM GRANULOCYTES NFR BLD: 0 % (ref 0–0.5)
LDLC SERPL CALC-MCNC: 83 MG/DL (ref 0–100)
LDLC/HDLC SERPL: 1.56 {RATIO}
LV EF 2D ECHO EST: 55 %
LYMPHOCYTES # BLD AUTO: 2.47 10*3/MM3 (ref 1–3)
LYMPHOCYTES NFR BLD AUTO: 38.3 % (ref 16–46)
MCH RBC QN AUTO: 29.4 PG (ref 27–33)
MCHC RBC AUTO-ENTMCNC: 31.7 G/DL (ref 33–37)
MCV RBC AUTO: 92.7 FL (ref 80–94)
MONOCYTES # BLD AUTO: 0.48 10*3/MM3 (ref 0.1–0.9)
MONOCYTES NFR BLD AUTO: 7.4 % (ref 0–12)
MYOGLOBIN SERPL-MCNC: 46 NG/ML (ref 0–109)
NEUTROPHILS # BLD AUTO: 3.37 10*3/MM3 (ref 1.4–6.5)
NEUTROPHILS NFR BLD AUTO: 52.3 % (ref 40–75)
OSMOLALITY SERPL CALC.SUM OF ELEC: 279.2 MOSM/KG (ref 273–305)
PLATELET # BLD AUTO: 214 10*3/MM3 (ref 130–400)
PMV BLD AUTO: 10.1 FL (ref 6–10)
POTASSIUM BLD-SCNC: 4 MMOL/L (ref 3.5–5.3)
PROT SERPL-MCNC: 6.1 G/DL (ref 6–8)
RBC # BLD AUTO: 3.84 10*6/MM3 (ref 4.2–5.4)
SODIUM BLD-SCNC: 140 MMOL/L (ref 135–153)
TRIGL SERPL-MCNC: 87 MG/DL (ref 0–150)
TROPONIN I SERPL-MCNC: <0.006 NG/ML
VLDLC SERPL-MCNC: 17.4 MG/DL
WBC NRBC COR # BLD: 6.45 10*3/MM3 (ref 4.5–12.5)

## 2017-04-17 PROCEDURE — 80053 COMPREHEN METABOLIC PANEL: CPT | Performed by: INTERNAL MEDICINE

## 2017-04-17 PROCEDURE — 94799 UNLISTED PULMONARY SVC/PX: CPT

## 2017-04-17 PROCEDURE — 99226 PR SBSQ OBSERVATION CARE/DAY 35 MINUTES: CPT | Performed by: INTERNAL MEDICINE

## 2017-04-17 PROCEDURE — 80061 LIPID PANEL: CPT | Performed by: INTERNAL MEDICINE

## 2017-04-17 PROCEDURE — 25010000002 HEPARIN (PORCINE) PER 1000 UNITS: Performed by: PHYSICIAN ASSISTANT

## 2017-04-17 PROCEDURE — 85025 COMPLETE CBC W/AUTO DIFF WBC: CPT | Performed by: INTERNAL MEDICINE

## 2017-04-17 PROCEDURE — 25010000002 COSYNTROPIN PER 0.25 MG: Performed by: INTERNAL MEDICINE

## 2017-04-17 PROCEDURE — 82553 CREATINE MB FRACTION: CPT | Performed by: PHYSICIAN ASSISTANT

## 2017-04-17 PROCEDURE — 93880 EXTRACRANIAL BILAT STUDY: CPT | Performed by: RADIOLOGY

## 2017-04-17 PROCEDURE — 84484 ASSAY OF TROPONIN QUANT: CPT | Performed by: PHYSICIAN ASSISTANT

## 2017-04-17 PROCEDURE — G0378 HOSPITAL OBSERVATION PER HR: HCPCS

## 2017-04-17 PROCEDURE — 86140 C-REACTIVE PROTEIN: CPT | Performed by: INTERNAL MEDICINE

## 2017-04-17 PROCEDURE — 93306 TTE W/DOPPLER COMPLETE: CPT | Performed by: INTERNAL MEDICINE

## 2017-04-17 PROCEDURE — 93306 TTE W/DOPPLER COMPLETE: CPT

## 2017-04-17 PROCEDURE — 82533 TOTAL CORTISOL: CPT | Performed by: INTERNAL MEDICINE

## 2017-04-17 PROCEDURE — 99203 OFFICE O/P NEW LOW 30 MIN: CPT | Performed by: INTERNAL MEDICINE

## 2017-04-17 PROCEDURE — 93880 EXTRACRANIAL BILAT STUDY: CPT

## 2017-04-17 PROCEDURE — 82550 ASSAY OF CK (CPK): CPT | Performed by: PHYSICIAN ASSISTANT

## 2017-04-17 PROCEDURE — 83874 ASSAY OF MYOGLOBIN: CPT | Performed by: PHYSICIAN ASSISTANT

## 2017-04-17 PROCEDURE — 96374 THER/PROPH/DIAG INJ IV PUSH: CPT

## 2017-04-17 PROCEDURE — 96372 THER/PROPH/DIAG INJ SC/IM: CPT

## 2017-04-17 RX ORDER — COSYNTROPIN 0.25 MG/ML
0.25 INJECTION, POWDER, FOR SOLUTION INTRAMUSCULAR; INTRAVENOUS ONCE
Status: COMPLETED | OUTPATIENT
Start: 2017-04-17 | End: 2017-04-17

## 2017-04-17 RX ADMIN — HEPARIN SODIUM 5000 UNITS: 5000 INJECTION, SOLUTION INTRAVENOUS; SUBCUTANEOUS at 08:08

## 2017-04-17 RX ADMIN — FAMOTIDINE 20 MG: 20 TABLET, FILM COATED ORAL at 08:08

## 2017-04-17 RX ADMIN — HEPARIN SODIUM 5000 UNITS: 5000 INJECTION, SOLUTION INTRAVENOUS; SUBCUTANEOUS at 20:33

## 2017-04-17 RX ADMIN — FAMOTIDINE 20 MG: 20 TABLET, FILM COATED ORAL at 20:33

## 2017-04-17 RX ADMIN — COSYNTROPIN 0.25 MG: 0.25 INJECTION, POWDER, LYOPHILIZED, FOR SOLUTION INTRAMUSCULAR; INTRAVENOUS at 09:00

## 2017-04-17 NOTE — CONSULTS
"Referring Provider: [facility]  Reason for Consultation: Diaphoresis, bradycardia and near syncope.    Patient Care Team:  Arun Phelps MD as PCP - General (General Surgery)  No Known Provider as PCP - Family Medicine    Chief complaint: malaise, bradycardia and \"almost passing out\"    Subjective .     History of present illness:      The patient is a 76-year-old white female with a history of hiatal hernia and gastric esophageal reflux disease comes to the hospital for an episode of dizziness and bradycardia that started yesterday.  According to the patient she was in her usual state of health until the morning when she woke up and felt extremely dizzy.  She states she fell when she was about to pass out and had to lay back down in her bed.  She tried to get up multiple times but presented with persistent dizziness.  She also reports severe associated diaphoresis and shortness of breath.  She reports an episode of nausea and vomiting on the way to the hospital. She denies any chest pain or palpitations.  She states her pulse was checked and it was noted to be in the 40s and her blood pressure was within normal limits.  She was brought to the emergency room where her vitals were noted to be mildly decreased at 53.  She has also noticed to have a left bundle-branch block on that she states she has had this at least since 2008.  According to the patient she underwent extensive evaluation at [facility] when the a left bundle-branch block was diagnosed by her diagnosis was obtained at the time.  The patient has been asymptomatic since yesterday.  Telemetry has shown no evidence of arrhythmias and her pulse has remained at acceptable levels.  She has had no episodes of hypotension.  Cardiology was consulted for further evaluation of the patient's episode of near syncope and diaphoresis.    Review of Systems    Review of Systems   Constitution: Positive for chills and diaphoresis. Negative for fever.   HENT: " Negative for congestion, ear pain and nosebleeds.    Eyes: Negative for blurred vision, pain and redness.   Cardiovascular: Positive for near-syncope. Negative for chest pain, leg swelling, orthopnea, palpitations, paroxysmal nocturnal dyspnea and syncope.   Respiratory: Positive for shortness of breath. Negative for cough, hemoptysis, sputum production and wheezing.    Endocrine: Negative for cold intolerance and heat intolerance.   Hematologic/Lymphatic: Does not bruise/bleed easily.   Skin: Negative for rash.   Musculoskeletal: Positive for back pain and stiffness. Negative for arthritis, joint pain, joint swelling, myalgias and neck pain.   Gastrointestinal: Positive for abdominal pain, heartburn, nausea and vomiting. Negative for constipation, diarrhea, hematemesis, hematochezia and melena.   Genitourinary: Negative for dysuria and hematuria.   Neurological: Negative for dizziness, focal weakness, numbness and seizures.   Psychiatric/Behavioral: Negative for depression. The patient is not nervous/anxious.        History    Past Medical History:   Diagnosis Date   • GERD (gastroesophageal reflux disease)    • Hiatal hernia    • LBBB (left bundle branch block)    • Melanoma         Past Surgical History:   Procedure Laterality Date   • APPENDECTOMY     • COLONOSCOPY  1990   • ENDOSCOPY N/A 12/30/2016    Procedure: ESOPHAGOGASTRODUODENOSCOPY WITH ANESTHESIA;  Surgeon: Arun Phelps MD;  Location: [facility];  Service:    • ESOPHAGOGASTRECTOMY     • SKIN CANCER EXCISION         Family History   Problem Relation Age of Onset   • Cancer Mother      Melanoma   • Diabetes Neg Hx    • Heart disease Neg Hx    • Hypertension Neg Hx         Social History   Substance Use Topics   • Smoking status: Former Smoker     Packs/day: 3.00     Years: 25.00     Quit date: 1975   • Smokeless tobacco: None   • Alcohol use No       Prescriptions Prior to Admission   Medication Sig Dispense Refill Last Dose   • pantoprazole  "(PROTONIX) 40 MG EC tablet Take 40 mg by mouth 2 (Two) Times a Day.   Past Week at Wednesday         Scheduled Meds:        famotidine 20 mg Oral Q12H   heparin (porcine) 5,000 Units Subcutaneous Q12H   , Continuous Infusions:   , PRN Meds:      nitroglycerin  •  sodium chloride and Allergies:  Protonix [pantoprazole sodium]    Objective     Vital Sign Min/Max for last 24 hours  Temp  Min: 97.4 °F (36.3 °C)  Max: 98 °F (36.7 °C)   BP  Min: 116/58  Max: 140/72   Pulse  Min: 58  Max: 84   Resp  Min: 18  Max: 20   SpO2  Min: 96 %  Max: 98 %   No Data Recorded   No Data Recorded     Flowsheet Rows         First Filed Value    Admission Height  66\" (167.6 cm) Documented at 04/16/2017 1018    Admission Weight  160 lb (72.6 kg) Documented at 04/16/2017 1018             Ejection Fraction  No results found for: EF    Echo EF Estimated  No results found for: ECHOEFEST    Nuclear Stress Ejection Fraction  No components found for: NUCEF    Cath Ejection Fraction Quantitative  No results found for: CATHEF    Physical Exam   Constitutional: She is oriented to person, place, and time. She appears well-developed and well-nourished.   White female lying comfortably in bed.   HENT:   Mouth/Throat: Oropharynx is clear and moist.   Eyes: EOM are normal. Pupils are equal, round, and reactive to light.   Neck: Neck supple. No JVD present. No tracheal deviation present. No thyromegaly present.   Cardiovascular: Normal rate, regular rhythm, S1 normal and S2 normal.  Exam reveals no gallop and no friction rub.    No murmur heard.  Pulmonary/Chest: Effort normal and breath sounds normal. No respiratory distress. She has no wheezes. She has no rales.   Abdominal: Soft. Bowel sounds are normal. She exhibits no mass. There is no tenderness.   Musculoskeletal: Normal range of motion. She exhibits no edema.   Lymphadenopathy:     She has no cervical adenopathy.   Neurological: She is alert and oriented to person, place, and time.   Skin: Skin is " warm and dry. No rash noted.   Psychiatric: She has a normal mood and affect.       Results Review:   I reviewed the patient's new clinical results.  I reviewed the patient's new imaging results and agree with the interpretation.  I reviewed the patient's other test results and agree with the interpretation  I personally viewed and interpreted the patient's EKG/Telemetry data      Assessment/Plan     Active Problems:    Generalized weakness    Near syncope    1.  Near syncope: Patient with episode of near-syncope of unclear etiology.  Monitoring overnight has shown no evidence of significant arrhythmias.  Her vital signs have remained stable since admission.  Troponins have been negative overnight and EKG shows sinus bradycardia with left bundle-branch block.  An echocardiogram has been ordered by the primary team but is still pending.  TSH is within normal limits. This is concerning for possible occult ischemia and I discussed with the patient the option of undergoing nuclear stress test to further evaluate for possible occult ischemia as a source of both her EKG changes and her symptoms.  At this point the patient states she is not interested in undergoing this test but will think about it.  Her symptoms have not recurred since admission.  She has no significant electrolyte abnormalities noted.  She is however noted to have a moderate decrease in hematocrit which could be dilutional.    2.  Anemia: Patient with normal hematocrit on admission that has decreased from 41-35.  This could be delusional due to IV fluids administered admission.  This is being monitored primary team.    3.  Bradycardia: Patient with what appears to be a brief episode of bradycardia with unclear significance.  She has not presented with significant bradycardia during this admission.    Plan:    1.  Near syncope: Since the patient is refusing stress test at this time we will complete echocardiogram and evaluate.  Thyroid function was noted  to be normal.  Would continue to monitor on telemetry for arrhythmias.  Would follow her hematocrit closely to evaluate for possible GI bleeding.    2.  Anemia: Patient with mild anemia on current regimen.  Would continue to monitor closely for worsening.    3.  Bradycardia: We will continue to monitor for further episodes of bradycardia on telemetry.    I discussed the patients findings and my recommendations with patient    Sharath Del Valle MD  04/17/17  5:24 PM

## 2017-04-17 NOTE — PROGRESS NOTES
I have seen the patient in conjunction with daughter, granddaughter, Vy Asif PAULINA       History of presenting illness:  History was reviewed with the patient.  She's been feeling weak apparently for days however yesterday a.m. she states she woke up feeling very weak.  Daughter states that her speech was slow but she did not notice any facial droop.  Patient states she had underlying the bed for an hour and then set up for an hour before she was able to get up and walk.  She continued to feel very poorly and decision was made to come to the emergency room however she had some emesis on the way.  She's had no diarrhea.  She did have fairly profuse sweating the night that this happened.  She had no chest pain.  No abdominal pain.  No urinary symptoms.  No seizure activity.  No trouble with paresthesias.  She states she's feeling back to normal now.  It was noted that her pulse was 41 by her machine she takes her blood pressure during these events and she was actually hypotensive.  She has no cardiac history no family history of cardiac disease.  It is noted her EKG showed a left bundle branch block which are reviewed but she doesn't ever being told even back to 2008 that she had a left bundle block.    History was from patient as well as patient's daughter    Vital Signs  Temp:  [97.4 °F (36.3 °C)-98 °F (36.7 °C)] 97.9 °F (36.6 °C)  Heart Rate:  [58-84] 84  Resp:  [18-20] 20  BP: (116-140)/(58-76) 129/72  Body mass index is 26.53 kg/(m^2).      Intake/Output Summary (Last 24 hours) at 04/17/17 1632  Last data filed at 04/16/17 1844   Gross per 24 hour   Intake              340 ml   Output                0 ml   Net              340 ml     Intake & Output (last 3 days)       04/14 0701 - 04/15 0700 04/15 0701 - 04/16 0700 04/16 0701 - 04/17 0700 04/17 0701 - 04/18 0700    P.O.   340     Total Intake(mL/kg)   340 (4.6)     Net     +340                    Physical exam:  Physical Exam   Constitutional: Well-developed,  well-nourished.  Pleasant.  No distress  Head: Normocephalic and atraumatic.  Hearing is intact, mucous membranes are moist.   Eyes:  Pupils are equal, round, and reactive to light. No scleral icterus.   Neck: Normal range of motion. Neck supple.   Cardiovascular: Normal rate, regular rhythm and normal heart sounds.  No murmur rub or gallop no carotid bruits  Pulmonary/Chest: Bilateral breath sounds no rhonchus rales or wheezing heard  Abdominal: Soft, flat, bowel sounds are active, nondistended nontender.  No peritoneal signs   Musculoskeletal: Strength is symmetric, no joint effusions noted.  No edema.  Neurological: Nonfocal, alert.  Sensation intact in the lower extremities.  Skin: Skin is warm and dry.   Psychiatric:  Normal mood and affect.     EKG: Left bundle bradycardia, reviewed  Telemetry: Sinus rhythm and sinus bradycardia    Results Review:  Lab Results   Component Value Date    WBC 6.45 04/17/2017    HGB 11.3 (L) 04/17/2017    HCT 35.6 (L) 04/17/2017    MCV 92.7 04/17/2017     04/17/2017     Lab Results   Component Value Date    GLUCOSE 100 04/17/2017    BUN 12 04/17/2017    CREATININE 0.65 04/17/2017    EGFRIFNONA 89 04/17/2017    BCR 18.5 04/17/2017    CO2 26.4 04/17/2017    CALCIUM 9.0 04/17/2017    ALBUMIN 3.70 04/17/2017    LABIL2 1.5 04/17/2017    AST 19 04/17/2017    ALT 17 04/17/2017       Imaging Results (last 72 hours)     Procedure Component Value Units Date/Time    XR Chest 1 View [[id number]] Collected:  04/16/17 1240     Updated:  04/16/17 1606    Narrative:       XR CHEST 1 VW-     REASON FOR EXAM:  SOA.     COMPARISON: No previous studies are available for comparison.     FINDINGS:    The cardiac silhouette is normal in size and shape. The lungs are mildly  hyperinflated but otherwise clear. There are no pleural effusions or  signs of heart failure.       Impression:       No evidence of active disease in the chest.     This report was finalized on 4/16/2017 4:04 PM by Dr. Orozco  Guero MCKEON MD.       CT Head Without Contrast [[id number]] Collected:  04/17/17 0645     Updated:  04/17/17 0647    Narrative:          EXAMINATION: CT HEAD WO CONTRAST-      CLINICAL INDICATION:     Weakness     TECHNIQUE: Contiguous axial CT images of the head were obtained without  contrast administration.      Radiation dose reduction techniques were utilized per ALARA protocol.  Automated exposure control was initiated through either or [employer] or  DoseRight software packages by  protocol.       620.08 mGy.cm     COMPARISON:  None.       FINDINGS: Generalized cerebral atrophy is present. There is no mass  effect, midline displacement, or hydrocephalus. There are patchy areas  of decreased density within the periventricular white matter which  likely reflect chronic small vessel ischemic changes. There is no CT  evidence of acute infarct or hemorrhage. Bone windows reveal no osseous  abnormalities or fractures.        Impression:       Atrophy and chronic small vessel ischemic change, but there  are no acute intracranial abnormalities identified.         This report was finalized on 4/17/2017 6:45 AM by Dr. Ernst Chew MD.       US Carotid Bilateral [[id number]] Collected:  04/17/17 1441     Updated:  04/17/17 1458    Narrative:       US CAROTID BILATERAL-     REASON FOR EXAM:  Generalized weakness, presyncope; R55-Syncope and  collapse.     TECHNIQUE:  Multiple real-time color doppler images were obtained.  M-mode Doppler was used for velocity determination and spectral pattern.  Stenosis was evaluated using the NASCET or similar measurement  technique.     RIGHT SIDE: The common carotid artery is normal in caliber. Peak  systolic and diastolic velocities are 122 and 18 cm/s. At the carotid  bifurcation, there was no stenosis in the internal carotid artery. Peak  systolic and diastolic velocities were 90 and 22 cm/s. Vertebral artery  flow was antegrade.     LEFT SIDE: The common carotid artery is  normal in caliber. Peak systolic  and diastolic velocities are 130 and 13 cm/s. At the carotid  bifurcation, there was no stenosis in the internal carotid artery. Peak  systolic and diastolic velocities were 104 and 27 cm/s. Vertebral artery  flow was antegrade.       Impression:       No hemodynamically significant plaques or stenoses were  demonstrated in the carotid systems. Both vertebral arteries showed  antegrade flow.     This report was finalized on 4/17/2017 2:56 PM by Dr. Ernesto Jack II, MD.                    Assessment/Plan     Active Problems:    Generalized weakness with associated hypothermia and bradycardia.  She is not adrenally insufficient, her TSH however has not been checked as of yet therefore this will be checked.  She seems to be improved now and she has adequate heart rate. An echocardiogram was not ordered and this is ordered, will reviewed this when available.  Cardiac enzymes are negative.  Patient was not exposed to any environmental exposures that would've caused the hypothermia.  Patient may have had some kind of viral infection although again this would be unusual to resolve this quickly.  She did not appear to be overtly septic and was not orthostatic.  Will monitor overnight, cardiology evaluation, echo, thyroid status evaluation, if all these are normal and she remains without bradycardia hypothermia may consider discharge tomorrow.    Gastroesophageal reflux disease, controlled on Pepcid    DVT prophylaxis, continue subcutaneous heparin    Intermittent right-sided abdominal discomfort that is chronic.  CT in 2015 did not show anything besides the appendicitis.  She states she has been evaluated for gallbladder problems and this was negative but I do not see this in our records.  H. pylori IgA was positive in the recent past and she was treated for this.      Angel Hernandez MD  04/17/17  4:32 PM

## 2017-04-17 NOTE — PROGRESS NOTES
Discharge Planning Assessment   [place]     Patient Name: Tessa Fisher  MRN: [id number]  Today's Date: 4/17/2017    Admit Date: 4/16/2017          Discharge Needs Assessment       04/17/17 0910    Living Environment    Lives With alone    Living Arrangements house    Quality Of Family Relationships supportive   supportive family and supportive sign. other.    Able to Return to Prior Living Arrangements yes    Discharge Needs Assessment    Concerns To Be Addressed no discharge needs identified;denies needs/concerns at this time    Readmission Within The Last 30 Days no previous admission in last 30 days    Community Agency Name(S) No Home Health services     Equipment Currently Used at Home none    Equipment Needed After Discharge none    Transportation Available --   Pt has been indep. with transportation    Discharge Disposition still a patient;home or self-care            Discharge Plan       04/17/17 0912    Case Management/Social Work Plan    Plan Pt placed in observation status on 4/18/17 with c/o new onset of  gen. weakness, diaphoresis. Pt is undergoing workup. She leads a very active lifestyle. Pt lives at home alone, indep with her care. She plans to return back home when stable for dc. Pt currently denies any anticipated dc needs. CM will cont .to follow and assist as needed.     Patient/Family In Agreement With Plan yes        Discharge Placement     No information found                Demographic Summary       04/17/17 0903    Referral Information    Admission Type observation    Arrived From home or self-care   Pt placed in observation status on 4/16/17 with dx: Gen. Weakness & near syncope. Pt undergoing workup    Referral Source admission list    Reason For Consult discharge planning    Record Reviewed medical record    Primary Care Physician Information    Name Pt reports she uses  Dr. Phelps (surgeon) for her PCP. CM offered to provide list of area providers, accepting new pts and pt declined  at this time.            Functional Status       04/17/17 0907    Functional Status Current    Current Functional Level Comment Pt reports she has been up to restroom, still with some gen. weakness.    Functional Status Prior    Prior Functional Level Comment Pt has been indep with act. at home, prior to onset of symptoms. 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 She uses [facility].            Psychosocial     None            Abuse/Neglect     None            Legal       04/17/17 0917    Legal    Legal Comments Pt has AD & POA. Her POA is her Dtr, Kamila Alba. Copies requested            Substance Abuse     None            Patient Forms     None          Brenda Boo RN

## 2017-04-18 ENCOUNTER — APPOINTMENT (OUTPATIENT)
Dept: NUCLEAR MEDICINE | Facility: HOSPITAL | Age: 77
End: 2017-04-18
Attending: INTERNAL MEDICINE

## 2017-04-18 ENCOUNTER — APPOINTMENT (OUTPATIENT)
Dept: CARDIOLOGY | Facility: HOSPITAL | Age: 77
End: 2017-04-18
Attending: INTERNAL MEDICINE

## 2017-04-18 VITALS
BODY MASS INDEX: 26.47 KG/M2 | DIASTOLIC BLOOD PRESSURE: 80 MMHG | TEMPERATURE: 98.3 F | RESPIRATION RATE: 18 BRPM | HEART RATE: 64 BPM | WEIGHT: 164.7 LBS | SYSTOLIC BLOOD PRESSURE: 138 MMHG | OXYGEN SATURATION: 95 % | HEIGHT: 66 IN

## 2017-04-18 LAB
ALBUMIN SERPL-MCNC: 3.7 G/DL (ref 3.4–4.8)
ALBUMIN/GLOB SERPL: 1.4 G/DL (ref 1.5–2.5)
ALP SERPL-CCNC: 71 U/L (ref 35–104)
ALT SERPL W P-5'-P-CCNC: 16 U/L (ref 10–36)
ANION GAP SERPL CALCULATED.3IONS-SCNC: 7.4 MMOL/L (ref 3.6–11.2)
AST SERPL-CCNC: 19 U/L (ref 10–30)
BASOPHILS # BLD AUTO: 0.02 10*3/MM3 (ref 0–0.3)
BASOPHILS NFR BLD AUTO: 0.1 % (ref 0–2)
BH CV NUCLEAR PRIOR STUDY: 3
BH CV STRESS BP STAGE 1: NORMAL
BH CV STRESS BP STAGE 2: NORMAL
BH CV STRESS COMMENTS STAGE 1: NORMAL
BH CV STRESS COMMENTS STAGE 2: NORMAL
BH CV STRESS DOSE REGADENOSON STAGE 1: 0.4
BH CV STRESS DURATION MIN STAGE 1: 0
BH CV STRESS DURATION MIN STAGE 2: 4
BH CV STRESS DURATION SEC STAGE 1: 15
BH CV STRESS DURATION SEC STAGE 2: 0
BH CV STRESS HR STAGE 1: 102
BH CV STRESS HR STAGE 2: 78
BH CV STRESS PROTOCOL 1: NORMAL
BH CV STRESS RECOVERY BP: NORMAL MMHG
BH CV STRESS RECOVERY HR: 76 BPM
BH CV STRESS STAGE 1: 1
BH CV STRESS STAGE 2: 2
BILIRUB SERPL-MCNC: 0.4 MG/DL (ref 0.2–1.8)
BUN BLD-MCNC: 13 MG/DL (ref 7–21)
BUN/CREAT SERPL: 16 (ref 7–25)
CALCIUM SPEC-SCNC: 9 MG/DL (ref 7.7–10)
CHLORIDE SERPL-SCNC: 109 MMOL/L (ref 99–112)
CO2 SERPL-SCNC: 22.6 MMOL/L (ref 24.3–31.9)
CREAT BLD-MCNC: 0.81 MG/DL (ref 0.43–1.29)
CRP SERPL-MCNC: <0.5 MG/DL (ref 0–0.99)
DEPRECATED RDW RBC AUTO: 45.4 FL (ref 37–54)
EOSINOPHIL # BLD AUTO: 0.06 10*3/MM3 (ref 0–0.7)
EOSINOPHIL NFR BLD AUTO: 0.4 % (ref 0–7)
ERYTHROCYTE [DISTWIDTH] IN BLOOD BY AUTOMATED COUNT: 13.9 % (ref 11.5–14.5)
GFR SERPL CREATININE-BSD FRML MDRD: 69 ML/MIN/1.73
GLOBULIN UR ELPH-MCNC: 2.7 GM/DL
GLUCOSE BLD-MCNC: 135 MG/DL (ref 70–110)
HCT VFR BLD AUTO: 39.9 % (ref 37–47)
HGB BLD-MCNC: 12.7 G/DL (ref 12–16)
IMM GRANULOCYTES # BLD: 0.02 10*3/MM3 (ref 0–0.03)
IMM GRANULOCYTES NFR BLD: 0.1 % (ref 0–0.5)
LYMPHOCYTES # BLD AUTO: 1.18 10*3/MM3 (ref 1–3)
LYMPHOCYTES NFR BLD AUTO: 8.2 % (ref 16–46)
MAGNESIUM SERPL-MCNC: 1.9 MG/DL (ref 1.7–2.6)
MAXIMAL PREDICTED HEART RATE: 145 BPM
MCH RBC QN AUTO: 30 PG (ref 27–33)
MCHC RBC AUTO-ENTMCNC: 31.8 G/DL (ref 33–37)
MCV RBC AUTO: 94.1 FL (ref 80–94)
MONOCYTES # BLD AUTO: 1.05 10*3/MM3 (ref 0.1–0.9)
MONOCYTES NFR BLD AUTO: 7.3 % (ref 0–12)
NEUTROPHILS # BLD AUTO: 12.1 10*3/MM3 (ref 1.4–6.5)
NEUTROPHILS NFR BLD AUTO: 83.9 % (ref 40–75)
OSMOLALITY SERPL CALC.SUM OF ELEC: 279.7 MOSM/KG (ref 273–305)
PERCENT MAX PREDICTED HR: 68.97 %
PHOSPHATE SERPL-MCNC: 2.7 MG/DL (ref 2.7–4.5)
PLATELET # BLD AUTO: 225 10*3/MM3 (ref 130–400)
PMV BLD AUTO: 10.3 FL (ref 6–10)
POTASSIUM BLD-SCNC: 3.6 MMOL/L (ref 3.5–5.3)
PROT SERPL-MCNC: 6.4 G/DL (ref 6–8)
RBC # BLD AUTO: 4.24 10*6/MM3 (ref 4.2–5.4)
SODIUM BLD-SCNC: 139 MMOL/L (ref 135–153)
STRESS BASELINE BP: NORMAL MMHG
STRESS BASELINE HR: 69 BPM
STRESS PERCENT HR: 81 %
STRESS POST PEAK BP: NORMAL MMHG
STRESS POST PEAK HR: 100 BPM
STRESS TARGET HR: 123 BPM
T4 FREE SERPL-MCNC: 1 NG/DL (ref 0.89–1.76)
TSH SERPL DL<=0.05 MIU/L-ACNC: 2.64 MIU/ML (ref 0.55–4.78)
WBC # BLD AUTO: 8.39 10*3/MM3 (ref 4.5–12.5)
WBC NRBC COR # BLD: 14.43 10*3/MM3 (ref 4.5–12.5)

## 2017-04-18 PROCEDURE — 0 TECHNETIUM SESTAMIBI: Performed by: INTERNAL MEDICINE

## 2017-04-18 PROCEDURE — 85048 AUTOMATED LEUKOCYTE COUNT: CPT | Performed by: INTERNAL MEDICINE

## 2017-04-18 PROCEDURE — 84443 ASSAY THYROID STIM HORMONE: CPT | Performed by: INTERNAL MEDICINE

## 2017-04-18 PROCEDURE — 78451 HT MUSCLE IMAGE SPECT SING: CPT

## 2017-04-18 PROCEDURE — 84100 ASSAY OF PHOSPHORUS: CPT | Performed by: INTERNAL MEDICINE

## 2017-04-18 PROCEDURE — 93017 CV STRESS TEST TRACING ONLY: CPT

## 2017-04-18 PROCEDURE — 78452 HT MUSCLE IMAGE SPECT MULT: CPT | Performed by: INTERNAL MEDICINE

## 2017-04-18 PROCEDURE — G0378 HOSPITAL OBSERVATION PER HR: HCPCS

## 2017-04-18 PROCEDURE — 25010000002 HEPARIN (PORCINE) PER 1000 UNITS: Performed by: PHYSICIAN ASSISTANT

## 2017-04-18 PROCEDURE — 94799 UNLISTED PULMONARY SVC/PX: CPT

## 2017-04-18 PROCEDURE — 84439 ASSAY OF FREE THYROXINE: CPT | Performed by: INTERNAL MEDICINE

## 2017-04-18 PROCEDURE — 93005 ELECTROCARDIOGRAM TRACING: CPT | Performed by: INTERNAL MEDICINE

## 2017-04-18 PROCEDURE — 25010000002 REGADENOSON 0.4 MG/5ML SOLUTION: Performed by: INTERNAL MEDICINE

## 2017-04-18 PROCEDURE — 86140 C-REACTIVE PROTEIN: CPT | Performed by: INTERNAL MEDICINE

## 2017-04-18 PROCEDURE — 80053 COMPREHEN METABOLIC PANEL: CPT | Performed by: INTERNAL MEDICINE

## 2017-04-18 PROCEDURE — A9500 TC99M SESTAMIBI: HCPCS | Performed by: INTERNAL MEDICINE

## 2017-04-18 PROCEDURE — 85025 COMPLETE CBC W/AUTO DIFF WBC: CPT | Performed by: INTERNAL MEDICINE

## 2017-04-18 PROCEDURE — 83735 ASSAY OF MAGNESIUM: CPT | Performed by: INTERNAL MEDICINE

## 2017-04-18 PROCEDURE — 99217 PR OBSERVATION CARE DISCHARGE MANAGEMENT: CPT | Performed by: INTERNAL MEDICINE

## 2017-04-18 PROCEDURE — 93018 CV STRESS TEST I&R ONLY: CPT | Performed by: INTERNAL MEDICINE

## 2017-04-18 RX ADMIN — Medication 1 DOSE: at 11:40

## 2017-04-18 RX ADMIN — Medication 1 DOSE: at 14:05

## 2017-04-18 RX ADMIN — REGADENOSON 0.4 MG: 0.08 INJECTION, SOLUTION INTRAVENOUS at 14:05

## 2017-04-18 RX ADMIN — FAMOTIDINE 20 MG: 20 TABLET, FILM COATED ORAL at 15:38

## 2017-04-18 NOTE — DISCHARGE SUMMARY
DATE OF ADMISSION:  04/16/2017    DATE OF DISCHARGE:  04/18/2017    DISCHARGE DIAGNOSIS:  Hypothermia and bradycardia of uncertain etiology, possible viral in nature.     SECONDARY DIAGNOSES:  1.  Chronic, for years, midabdominal discomfort followed by Dr. Phelps with recent esophagogastroduodenoscopy.   2.  Gastroesophageal reflux disease.     CONSULTANT: Cardiology.     PROCEDURES:    1.  Carotid Doppler.  2.  A 2D echocardiogram.   3.  Stress test.     HISTORY: See history and physical.     PHYSICAL EXAMINATION:  GENERAL: Exam on the day of discharge assisted by PAULINA Gillespie,  on [address], The patient's daughter is also present. The patient states she feels good. She has no chest discomfort during the stress test. She did have an episode of emesis last night, but she states she gets this way when she gets anxious even at home. Otherwise, she states she is breathing okay.  She has been up ambulating.  Her weakness has improved.   VITAL SIGNS: Blood pressure 138/67, respirations 20, pulse 66, temperature 97.6.   LUNGS: Bilateral breath sounds that are clear throughout. No rhonchi, rales, or wheezing heard. HEART: Regular rhythm without murmur, rub or gallop. She has a very benign abdominal exam, nontender.   EXTREMITIES: Trace edema of the right, none of the left, and apparently she has had some intermittent right leg edema for the last 5 years.     HOSPITAL COURSE: The patient was admitted with an odd constellation of symptoms including hypothermia, diagnosed by rectal temperature, as well as bradycardia at home and profound weakness. Workup was undertaken. It was thought that she could have had a viral illness as other workup was negative, and this abated fairly quickly.  She was not adrenally insufficient. Her thyroid status was normal. She had no evidence of sepsis, no exposure that would have explained the hypothermia and she was not in shock. She had an echocardiogram that was normal. She had carotid Dopplers  that were normal and she underwent a Lexiscan stress test that per verbal report from Dr. Gautam was unremarkable as well. The patient had a CT of her head without contrast that just showed some showed atrophy and chronic small vessel disease and her chest x-ray was negative. She does have a left bundle branch block, but this was not new. She remembered having this even back in 2008. This being the case, the exact etiology of this was uncertain, but her having now an adequate heart rate in the 60s and 70s, as well as being normothermic and maintaining this with normal blood work, it was thought she could be discharged home.  I have offered to find her a primary care provider, which she did not want to get. The patient has this chronic, but intermittent, epigastric pain and abdominal pain sometimes on the right side. Her last colonoscopy was 20-30 years ago and I have strongly suggested she gets this caught up as she has had a recent EGD. She does followup with Dr. Phelps for this discomfort and her reflux and I have suggested she see him within the next 2 weeks. If colonoscopy negative, she may need further imaging. Apparently, her gallbladder was looked at laparoscopically when he did the appendectomy, but she has had no formal imaging of this as well.     DIAGNOSTIC DATA: White count was mildly elevated this morning, but I repeated this and it was normal.  TSH 2.641.  Free T4 normal at 1.  Magnesium 1.9, phosphorus 2.7. Her transaminases are normal. CRP was less than 0.5.  Hemoglobin 12.7, hematocrit 40, platelet count 225,000. Cardiac enzymes are negative. LDL of 83.  Lactate 1.  CPKs were normal. Blood cultures negative. Urinalysis negative. Toxicology screen negative. PT and PTT normal.  BNP was 36. A1c was 5.7.     DISCHARGE MEDICATION:  Protonix 40 mg once a day.     DIET: As tolerated.         D: GENIE 04/18/2017 17:32:51 ET  T: hh / 04/18/2017 18:11:03 ET  Revision Count: 0  Job ID: [id number]  [id number]  cc:         Anushkaator Signature:___________________________     Angel Hernandez MD

## 2017-04-21 ENCOUNTER — DOCUMENTATION (OUTPATIENT)
Dept: HOSPITALIST | Facility: HOSPITAL | Age: 77
End: 2017-04-21

## 2017-04-21 LAB
BACTERIA SPEC AEROBE CULT: NORMAL
BACTERIA SPEC AEROBE CULT: NORMAL

## 2017-04-21 NOTE — PROGRESS NOTES
Patient was made an appointment (on April 18th) to see Dr Phelps for outpatient follow up. Appointment is scheduled for 5/02/17 @10:00. A letter will be mailed to the patient today for notification.

## 2017-07-05 ENCOUNTER — OFFICE VISIT (OUTPATIENT)
Dept: SURGERY | Facility: CLINIC | Age: 77
End: 2017-07-05

## 2017-07-05 VITALS — BODY MASS INDEX: 26.03 KG/M2 | HEIGHT: 66 IN | WEIGHT: 162 LBS

## 2017-07-05 DIAGNOSIS — Z13.9 SCREENING: Primary | ICD-10-CM

## 2017-07-05 DIAGNOSIS — K21.9 GASTROESOPHAGEAL REFLUX DISEASE WITHOUT ESOPHAGITIS: ICD-10-CM

## 2017-07-05 PROCEDURE — 99214 OFFICE O/P EST MOD 30 MIN: CPT | Performed by: SURGERY

## 2017-07-05 RX ORDER — FAMOTIDINE 20 MG/1
TABLET, FILM COATED ORAL
Refills: 1 | COMMUNITY
Start: 2017-06-05 | End: 2017-08-09 | Stop reason: ALTCHOICE

## 2017-07-05 RX ORDER — SODIUM, POTASSIUM,MAG SULFATES 17.5-3.13G
1 SOLUTION, RECONSTITUTED, ORAL ORAL EVERY 12 HOURS
Qty: 1 BOTTLE | Refills: 0 | Status: SHIPPED | OUTPATIENT
Start: 2017-07-05 | End: 2017-08-09

## 2017-07-05 NOTE — PROGRESS NOTES
Subjective   Tessa Fisher is a 76 y.o. female is here today for follow-up.    HPI Comments: 75 yo F with chronic epigastric pain that is worse at night.  She has known GERD and hiatal hernia.  Symptoms persist despite PPI therapy.  No ulceration on previous EGD.  Patient had colonoscopy over 25 years ago that was normal.  No unintentional weight loss.  No blood per rectum.  No family history of colon cancer.      Past Medical History:   Diagnosis Date   • GERD (gastroesophageal reflux disease)    • Hiatal hernia    • LBBB (left bundle branch block)    • Melanoma        Family History   Problem Relation Age of Onset   • Cancer Mother      Melanoma   • Diabetes Neg Hx    • Heart disease Neg Hx    • Hypertension Neg Hx        Social History     Social History   • Marital status:      Spouse name: N/A   • Number of children: N/A   • Years of education: N/A     Occupational History   • Not on file.     Social History Main Topics   • Smoking status: Former Smoker     Packs/day: 3.00     Years: 25.00     Quit date: 1975   • Smokeless tobacco: Never Used   • Alcohol use No   • Drug use: No   • Sexual activity: Defer     Other Topics Concern   • Not on file     Social History Narrative       Past Surgical History:   Procedure Laterality Date   • APPENDECTOMY     • COLONOSCOPY  1990   • ENDOSCOPY N/A 12/30/2016    Procedure: ESOPHAGOGASTRODUODENOSCOPY WITH ANESTHESIA;  Surgeon: Arun Phelps MD;  Location: [facility];  Service:    • ESOPHAGOGASTRECTOMY     • SKIN CANCER EXCISION         The following portions of the patient's history were reviewed and updated as appropriate: allergies, current medications, past family history, past medical history, past social history, past surgical history and problem list.    Review of Systems   Constitutional: Negative for activity change, appetite change, chills and fever.   HENT: Negative for sore throat and trouble swallowing.    Eyes: Negative for visual disturbance.  "  Respiratory: Negative for cough and shortness of breath.    Cardiovascular: Negative for chest pain and palpitations.   Gastrointestinal: Negative for abdominal distention, abdominal pain, blood in stool, constipation, diarrhea, nausea and vomiting.   Endocrine: Negative for cold intolerance and heat intolerance.   Genitourinary: Negative for dysuria.   Musculoskeletal: Negative for joint swelling.   Skin: Negative for color change, rash and wound.   Allergic/Immunologic: Negative for immunocompromised state.   Neurological: Negative for dizziness, seizures, weakness and headaches.   Hematological: Negative for adenopathy. Does not bruise/bleed easily.   Psychiatric/Behavioral: Negative for agitation and confusion.         Ht 66\" (167.6 cm)  Wt 162 lb (73.5 kg)  BMI 26.15 kg/m2  Objective   Physical Exam   Constitutional: She is oriented to person, place, and time. She appears well-developed.   HENT:   Head: Normocephalic and atraumatic.   Mouth/Throat: Mucous membranes are normal.   Eyes: Conjunctivae are normal. Pupils are equal, round, and reactive to light.   Neck: Neck supple. No JVD present. No tracheal deviation present. No thyromegaly present.   Cardiovascular: Normal rate and regular rhythm.  Exam reveals no gallop and no friction rub.    No murmur heard.  Pulmonary/Chest: Effort normal and breath sounds normal.   Abdominal: Soft. She exhibits no distension. There is no splenomegaly or hepatomegaly. There is no tenderness. No hernia.   Musculoskeletal: Normal range of motion. She exhibits no deformity.   Neurological: She is alert and oriented to person, place, and time.   Skin: Skin is warm and dry.   Psychiatric: She has a normal mood and affect.         Tessa was seen today for schedule colonoscopy.    Diagnoses and all orders for this visit:    Screening  -     Cancel: US Abdomen Complete; Future  -     Case Request; Standing  -     Case Request    Gastroesophageal reflux disease without " esophagitis    Other orders  -     Follow anesthesia standing orders.  -     Follow anesthesia standing orders.; Standing  -     Verify NPO Status; Standing  -     Obtain informed consent; Standing  -     SCD (sequential compression device)- to be placed on patient in Pre-op; Standing  -     sodium-potassium-magnesium sulfates (SUPREP BOWEL PREP KIT) 17.5-3.13-1.6 GM/180ML solution oral solution; Take 1 bottle by mouth Every 12 (Twelve) Hours.        Assessment     77 yo F with chronic abdominal pain.  She has not had screening colonoscopy in over 25 years.  She understands the risks of surgery and is scheduled for colonoscopy 7/6/17.

## 2017-07-06 ENCOUNTER — ANESTHESIA (OUTPATIENT)
Dept: PERIOP | Facility: HOSPITAL | Age: 77
End: 2017-07-06

## 2017-07-06 ENCOUNTER — HOSPITAL ENCOUNTER (OUTPATIENT)
Facility: HOSPITAL | Age: 77
Setting detail: HOSPITAL OUTPATIENT SURGERY
Discharge: HOME | End: 2017-07-06
Attending: SURGERY | Admitting: SURGERY

## 2017-07-06 ENCOUNTER — ANESTHESIA EVENT (OUTPATIENT)
Dept: PERIOP | Facility: HOSPITAL | Age: 77
End: 2017-07-06

## 2017-07-06 VITALS
BODY MASS INDEX: 24.91 KG/M2 | OXYGEN SATURATION: 100 % | SYSTOLIC BLOOD PRESSURE: 113 MMHG | HEIGHT: 66 IN | DIASTOLIC BLOOD PRESSURE: 65 MMHG | RESPIRATION RATE: 20 BRPM | HEART RATE: 60 BPM | TEMPERATURE: 97.6 F | WEIGHT: 155 LBS

## 2017-07-06 PROCEDURE — G0121 COLON CA SCRN NOT HI RSK IND: HCPCS | Performed by: SURGERY

## 2017-07-06 PROCEDURE — 25010000002 PROPOFOL 10 MG/ML EMULSION: Performed by: NURSE ANESTHETIST, CERTIFIED REGISTERED

## 2017-07-06 RX ORDER — PROPOFOL 10 MG/ML
VIAL (ML) INTRAVENOUS AS NEEDED
Status: DISCONTINUED | OUTPATIENT
Start: 2017-07-06 | End: 2017-07-06 | Stop reason: SURG

## 2017-07-06 RX ORDER — IPRATROPIUM BROMIDE AND ALBUTEROL SULFATE 2.5; .5 MG/3ML; MG/3ML
3 SOLUTION RESPIRATORY (INHALATION) ONCE AS NEEDED
Status: DISCONTINUED | OUTPATIENT
Start: 2017-07-06 | End: 2017-07-06 | Stop reason: HOSPADM

## 2017-07-06 RX ORDER — OXYCODONE HYDROCHLORIDE AND ACETAMINOPHEN 5; 325 MG/1; MG/1
1 TABLET ORAL ONCE AS NEEDED
Status: DISCONTINUED | OUTPATIENT
Start: 2017-07-06 | End: 2017-07-06 | Stop reason: HOSPADM

## 2017-07-06 RX ORDER — SODIUM CHLORIDE 0.9 % (FLUSH) 0.9 %
1-10 SYRINGE (ML) INJECTION AS NEEDED
Status: DISCONTINUED | OUTPATIENT
Start: 2017-07-06 | End: 2017-07-06 | Stop reason: HOSPADM

## 2017-07-06 RX ORDER — ONDANSETRON 2 MG/ML
4 INJECTION INTRAMUSCULAR; INTRAVENOUS ONCE AS NEEDED
Status: DISCONTINUED | OUTPATIENT
Start: 2017-07-06 | End: 2017-07-06 | Stop reason: HOSPADM

## 2017-07-06 RX ORDER — SODIUM CHLORIDE, SODIUM LACTATE, POTASSIUM CHLORIDE, CALCIUM CHLORIDE 600; 310; 30; 20 MG/100ML; MG/100ML; MG/100ML; MG/100ML
125 INJECTION, SOLUTION INTRAVENOUS CONTINUOUS
Status: DISCONTINUED | OUTPATIENT
Start: 2017-07-06 | End: 2017-07-06 | Stop reason: HOSPADM

## 2017-07-06 RX ORDER — MEPERIDINE HYDROCHLORIDE 25 MG/ML
12.5 INJECTION INTRAMUSCULAR; INTRAVENOUS; SUBCUTANEOUS
Status: DISCONTINUED | OUTPATIENT
Start: 2017-07-06 | End: 2017-07-06 | Stop reason: HOSPADM

## 2017-07-06 RX ORDER — SODIUM CHLORIDE, SODIUM LACTATE, POTASSIUM CHLORIDE, CALCIUM CHLORIDE 600; 310; 30; 20 MG/100ML; MG/100ML; MG/100ML; MG/100ML
INJECTION, SOLUTION INTRAVENOUS CONTINUOUS PRN
Status: DISCONTINUED | OUTPATIENT
Start: 2017-07-06 | End: 2017-07-06 | Stop reason: SURG

## 2017-07-06 RX ORDER — LIDOCAINE HYDROCHLORIDE 10 MG/ML
INJECTION, SOLUTION INFILTRATION; PERINEURAL AS NEEDED
Status: DISCONTINUED | OUTPATIENT
Start: 2017-07-06 | End: 2017-07-06 | Stop reason: SURG

## 2017-07-06 RX ORDER — FENTANYL CITRATE 50 UG/ML
50 INJECTION, SOLUTION INTRAMUSCULAR; INTRAVENOUS
Status: DISCONTINUED | OUTPATIENT
Start: 2017-07-06 | End: 2017-07-06 | Stop reason: HOSPADM

## 2017-07-06 RX ADMIN — LIDOCAINE HYDROCHLORIDE 60 MG: 10 INJECTION, SOLUTION INFILTRATION; PERINEURAL at 07:45

## 2017-07-06 RX ADMIN — PROPOFOL 100 MG: 10 INJECTION, EMULSION INTRAVENOUS at 07:45

## 2017-07-06 RX ADMIN — SODIUM CHLORIDE, POTASSIUM CHLORIDE, SODIUM LACTATE AND CALCIUM CHLORIDE: 600; 310; 30; 20 INJECTION, SOLUTION INTRAVENOUS at 07:40

## 2017-07-06 NOTE — ANESTHESIA POSTPROCEDURE EVALUATION
Patient: Tessa Fisher    Procedure Summary     Date Anesthesia Start Anesthesia Stop Room / Location    07/06/17 0740 0803 [facility] 09 / [facility]       Procedure Diagnosis Surgeon Provider    COLONOSCOPY (N/A ) Screening  (Screening [Z13.9]) MD Duy Moreno MD          Anesthesia Type: general  Last vitals  /65 (07/06/17 0840)    Temp 97.6 °F (36.4 °C) (07/06/17 0810)    Pulse 60 (07/06/17 0840)   Resp 20 (07/06/17 0840)    SpO2 100 % (07/06/17 0840)      Post Anesthesia Care and Evaluation    Patient location during evaluation: PHASE II  Patient participation: complete - patient participated  Level of consciousness: awake and alert  Pain score: 1  Pain management: adequate  Airway patency: patent  Anesthetic complications: No anesthetic complications  PONV Status: controlled  Cardiovascular status: acceptable  Respiratory status: acceptable  Hydration status: acceptable

## 2017-07-06 NOTE — ANESTHESIA PREPROCEDURE EVALUATION
Anesthesia Evaluation     Patient summary reviewed and Nursing notes reviewed   no history of anesthetic complications:  NPO Solid Status: > 8 hours  NPO Liquid Status: > 8 hours     Airway   Mallampati: II  TM distance: >3 FB  Neck ROM: full  no difficulty expected  Dental - normal exam     Pulmonary - normal exam   (+) a smoker Former,   (-) asthma  Cardiovascular - normal exam  Exercise tolerance: good (4-7 METS)    NYHA Classification: II    (+) dysrhythmias,   (-) hypertension, past MI, angina, CHF, hyperlipidemia      Neuro/Psych- negative ROS  (-) seizures  GI/Hepatic/Renal/Endo    (+)  hiatal hernia, GERD,   (-) diabetes, hypothyroidism    Musculoskeletal (-) negative ROS    Abdominal  - normal exam    Bowel sounds: normal.   Substance History - negative use     OB/GYN negative ob/gyn ROS         Other      history of cancer                                    Anesthesia Plan    ASA 2     general     intravenous induction   Anesthetic plan and risks discussed with patient and child.  Use of blood products discussed with patient and child  Consented to blood products.

## 2017-07-06 NOTE — OP NOTE
COLONOSCOPY  Procedure Note    Tessa Fisher  7/6/2017    Pre-op Diagnosis:   Screening [Z13.9]    Post-op Diagnosis:       Indications: see above    Procedure(s):  COLONOSCOPY    Surgeon(s):  Arun Phelps MD    Anesthesia: Choice    Staff:   Circulator: Catrachita Harvey RN  Endo Technician: Gopal Cartagena    Findings: normal colonoscopy    Operative Procedure: The patient was taken to the operating suite and placed in left lateral decubitus position.  Bilateral sequential compression devices were in place and IV anesthesia was administered.  Timeout procedure was performed.  Digital rectal examination was negative.  The colonoscope was inserted and advanced to the cecum as evidenced by the ileocecal valve and appendiceal orifice.  The bowel prep was good.  The colonoscope was slowly removed and the entirety of the colonic mucosa was evaluated.  Colonoscopic findings included normal colon mucosa throughout.  The colonoscope was then removed and the patient was awakened from anesthesia and taken recovery.  They tolerated the procedure well.    Estimated Blood Loss: none    Specimens:  none                  Drains: none    Grafts or Implants: none    Complications: None    Recommendations: colonoscopy in 10 years.  Follow up in 2 weeks.    Arun Phelps MD     Date: 7/6/2017  Time: 8:25 AM

## 2017-07-06 NOTE — H&P (VIEW-ONLY)
Subjective   Tessa Fisher is a 76 y.o. female is here today for follow-up.    HPI Comments: 77 yo F with chronic epigastric pain that is worse at night.  She has known GERD and hiatal hernia.  Symptoms persist despite PPI therapy.  No ulceration on previous EGD.  Patient had colonoscopy over 25 years ago that was normal.  No unintentional weight loss.  No blood per rectum.  No family history of colon cancer.      Past Medical History:   Diagnosis Date   • GERD (gastroesophageal reflux disease)    • Hiatal hernia    • LBBB (left bundle branch block)    • Melanoma        Family History   Problem Relation Age of Onset   • Cancer Mother      Melanoma   • Diabetes Neg Hx    • Heart disease Neg Hx    • Hypertension Neg Hx        Social History     Social History   • Marital status:      Spouse name: N/A   • Number of children: N/A   • Years of education: N/A     Occupational History   • Not on file.     Social History Main Topics   • Smoking status: Former Smoker     Packs/day: 3.00     Years: 25.00     Quit date: 1975   • Smokeless tobacco: Never Used   • Alcohol use No   • Drug use: No   • Sexual activity: Defer     Other Topics Concern   • Not on file     Social History Narrative       Past Surgical History:   Procedure Laterality Date   • APPENDECTOMY     • COLONOSCOPY  1990   • ENDOSCOPY N/A 12/30/2016    Procedure: ESOPHAGOGASTRODUODENOSCOPY WITH ANESTHESIA;  Surgeon: Arun Phelps MD;  Location: [facility];  Service:    • ESOPHAGOGASTRECTOMY     • SKIN CANCER EXCISION         The following portions of the patient's history were reviewed and updated as appropriate: allergies, current medications, past family history, past medical history, past social history, past surgical history and problem list.    Review of Systems   Constitutional: Negative for activity change, appetite change, chills and fever.   HENT: Negative for sore throat and trouble swallowing.    Eyes: Negative for visual disturbance.  "  Respiratory: Negative for cough and shortness of breath.    Cardiovascular: Negative for chest pain and palpitations.   Gastrointestinal: Negative for abdominal distention, abdominal pain, blood in stool, constipation, diarrhea, nausea and vomiting.   Endocrine: Negative for cold intolerance and heat intolerance.   Genitourinary: Negative for dysuria.   Musculoskeletal: Negative for joint swelling.   Skin: Negative for color change, rash and wound.   Allergic/Immunologic: Negative for immunocompromised state.   Neurological: Negative for dizziness, seizures, weakness and headaches.   Hematological: Negative for adenopathy. Does not bruise/bleed easily.   Psychiatric/Behavioral: Negative for agitation and confusion.         Ht 66\" (167.6 cm)  Wt 162 lb (73.5 kg)  BMI 26.15 kg/m2  Objective   Physical Exam   Constitutional: She is oriented to person, place, and time. She appears well-developed.   HENT:   Head: Normocephalic and atraumatic.   Mouth/Throat: Mucous membranes are normal.   Eyes: Conjunctivae are normal. Pupils are equal, round, and reactive to light.   Neck: Neck supple. No JVD present. No tracheal deviation present. No thyromegaly present.   Cardiovascular: Normal rate and regular rhythm.  Exam reveals no gallop and no friction rub.    No murmur heard.  Pulmonary/Chest: Effort normal and breath sounds normal.   Abdominal: Soft. She exhibits no distension. There is no splenomegaly or hepatomegaly. There is no tenderness. No hernia.   Musculoskeletal: Normal range of motion. She exhibits no deformity.   Neurological: She is alert and oriented to person, place, and time.   Skin: Skin is warm and dry.   Psychiatric: She has a normal mood and affect.         Tessa was seen today for schedule colonoscopy.    Diagnoses and all orders for this visit:    Screening  -     Cancel: US Abdomen Complete; Future  -     Case Request; Standing  -     Case Request    Gastroesophageal reflux disease without " esophagitis    Other orders  -     Follow anesthesia standing orders.  -     Follow anesthesia standing orders.; Standing  -     Verify NPO Status; Standing  -     Obtain informed consent; Standing  -     SCD (sequential compression device)- to be placed on patient in Pre-op; Standing  -     sodium-potassium-magnesium sulfates (SUPREP BOWEL PREP KIT) 17.5-3.13-1.6 GM/180ML solution oral solution; Take 1 bottle by mouth Every 12 (Twelve) Hours.        Assessment     77 yo F with chronic abdominal pain.  She has not had screening colonoscopy in over 25 years.  She understands the risks of surgery and is scheduled for colonoscopy 7/6/17.

## 2017-07-21 ENCOUNTER — HOSPITAL ENCOUNTER (EMERGENCY)
Facility: HOSPITAL | Age: 77
Discharge: HOME | End: 2017-07-21
Admitting: EMERGENCY MEDICINE

## 2017-07-21 ENCOUNTER — APPOINTMENT (OUTPATIENT)
Dept: GENERAL RADIOLOGY | Facility: HOSPITAL | Age: 77
End: 2017-07-21

## 2017-07-21 VITALS
WEIGHT: 160 LBS | TEMPERATURE: 97 F | OXYGEN SATURATION: 100 % | BODY MASS INDEX: 25.71 KG/M2 | SYSTOLIC BLOOD PRESSURE: 140 MMHG | RESPIRATION RATE: 16 BRPM | HEIGHT: 66 IN | DIASTOLIC BLOOD PRESSURE: 87 MMHG | HEART RATE: 67 BPM

## 2017-07-21 DIAGNOSIS — M25.561 ACUTE PAIN OF RIGHT KNEE: Primary | ICD-10-CM

## 2017-07-21 PROCEDURE — 73590 X-RAY EXAM OF LOWER LEG: CPT

## 2017-07-21 PROCEDURE — 99282 EMERGENCY DEPT VISIT SF MDM: CPT

## 2017-07-21 PROCEDURE — 73590 X-RAY EXAM OF LOWER LEG: CPT | Performed by: RADIOLOGY

## 2017-07-21 PROCEDURE — 73564 X-RAY EXAM KNEE 4 OR MORE: CPT | Performed by: RADIOLOGY

## 2017-07-21 PROCEDURE — 73564 X-RAY EXAM KNEE 4 OR MORE: CPT

## 2017-07-21 NOTE — ED PROVIDER NOTES
Subjective   Patient is a 76 y.o. female presenting with lower extremity pain.   Lower Extremity Issue   Location:  Knee  Time since incident:  2 days  Pain details:     Quality:  Aching    Severity:  Moderate    Onset quality:  Sudden    Duration:  2 days    Timing:  Constant    Progression:  Waxing and waning  Chronicity:  New  Foreign body present:  No foreign bodies  Tetanus status:  Up to date  Prior injury to area:  No  Relieved by:  None tried  Worsened by:  Bearing weight  Ineffective treatments:  None tried  Associated symptoms: no back pain, no decreased ROM, no fatigue, no fever, no itching, no muscle weakness, no neck pain, no numbness, no stiffness, no swelling and no tingling    Risk factors: no concern for non-accidental trauma, no frequent fractures, no known bone disorder, no obesity and no recent illness        Review of Systems   Constitutional: Negative for fatigue and fever.   HENT: Negative.    Eyes: Negative.    Respiratory: Negative.    Cardiovascular: Negative.    Gastrointestinal: Negative.    Endocrine: Negative.    Genitourinary: Negative.    Musculoskeletal: Negative.  Negative for back pain, neck pain and stiffness.   Skin: Negative.  Negative for itching.   Allergic/Immunologic: Negative.    Neurological: Negative.    Hematological: Negative.    Psychiatric/Behavioral: Negative.        Past Medical History:   Diagnosis Date   • GERD (gastroesophageal reflux disease)    • Hiatal hernia    • LBBB (left bundle branch block)    • Melanoma        Allergies   Allergen Reactions   • Protonix [Pantoprazole Sodium] Swelling       Past Surgical History:   Procedure Laterality Date   • APPENDECTOMY     • COLONOSCOPY  1990   • COLONOSCOPY N/A 7/6/2017    Procedure: COLONOSCOPY;  Surgeon: Arun Phelps MD;  Location: [facility];  Service:    • ENDOSCOPY N/A 12/30/2016    Procedure: ESOPHAGOGASTRODUODENOSCOPY WITH ANESTHESIA;  Surgeon: Arun Phelps MD;  Location: [facility];  Service:     • ESOPHAGOGASTRECTOMY     • SKIN CANCER EXCISION         Family History   Problem Relation Age of Onset   • Cancer Mother      Melanoma   • Diabetes Neg Hx    • Heart disease Neg Hx    • Hypertension Neg Hx        Social History     Social History   • Marital status:      Spouse name: N/A   • Number of children: N/A   • Years of education: N/A     Social History Main Topics   • Smoking status: Former Smoker     Packs/day: 3.00     Years: 25.00     Quit date: 1975   • Smokeless tobacco: Never Used   • Alcohol use No   • Drug use: No   • Sexual activity: Defer     Other Topics Concern   • None     Social History Narrative           Objective   Physical Exam   Constitutional: She is oriented to person, place, and time. She appears well-developed and well-nourished.   HENT:   Head: Normocephalic.   Right Ear: External ear normal.   Left Ear: External ear normal.   Mouth/Throat: Oropharynx is clear and moist.   Eyes: EOM are normal. Pupils are equal, round, and reactive to light.   Cardiovascular: Normal rate and regular rhythm.    Pulmonary/Chest: Effort normal and breath sounds normal.   Musculoskeletal:        Right knee: She exhibits decreased range of motion.   Neurological: She is alert and oriented to person, place, and time.   Nursing note and vitals reviewed.      Procedures         ED Course  ED Course                  MDM    Final diagnoses:   Acute pain of right knee            Dwayne Parekh, APRN  07/21/17 [id number]

## 2017-07-26 ENCOUNTER — OFFICE VISIT (OUTPATIENT)
Dept: SURGERY | Facility: CLINIC | Age: 77
End: 2017-07-26

## 2017-07-26 VITALS — WEIGHT: 160 LBS | HEIGHT: 66 IN | BODY MASS INDEX: 25.71 KG/M2

## 2017-07-26 DIAGNOSIS — Z13.9 SCREENING: Primary | ICD-10-CM

## 2017-07-26 DIAGNOSIS — K21.9 GASTROESOPHAGEAL REFLUX DISEASE WITHOUT ESOPHAGITIS: ICD-10-CM

## 2017-07-26 PROCEDURE — 99212 OFFICE O/P EST SF 10 MIN: CPT | Performed by: SURGERY

## 2017-07-27 RX ORDER — FLUCONAZOLE 40 MG/ML
200 POWDER, FOR SUSPENSION ORAL DAILY
Qty: 35 ML | Refills: 0 | Status: SHIPPED | OUTPATIENT
Start: 2017-07-27 | End: 2017-08-08 | Stop reason: SDUPTHER

## 2017-07-27 NOTE — PROGRESS NOTES
Subjective   Tessa Fisher is a 76 y.o. female  is here today for follow-up.         Tessa Fisher is a 76 y.o. female here for follow up after colonoscopy.    Findings of colonoscopy were negative.  The patient continues to have epigastric pain which may be in relation to her hiatal hernia and reflux.  The protonic some Pepcid did not seem to help.  The patient is coughing up white phlegm.  She has previously been treated for H. pylori and had response.  On examination her abdomen is soft nontender nondistended.        There are no diagnoses linked to this encounter.    Assessment     Tessa Fisher is a 76 y.o. female is here for follow-up for colonoscopy.  Colonoscopy findings were benign.  The patient has persistent reflux symptoms despite PPI therapy.  I will treat her with Diflucan she reports also will thrush-type symptoms.  The patient will follow-up in 2 weeks.

## 2017-08-08 RX ORDER — FLUCONAZOLE 200 MG/1
200 TABLET ORAL DAILY
Qty: 7 TABLET | Refills: 0 | Status: SHIPPED | OUTPATIENT
Start: 2017-08-08 | End: 2018-04-01

## 2017-08-09 ENCOUNTER — OFFICE VISIT (OUTPATIENT)
Dept: FAMILY MEDICINE CLINIC | Facility: CLINIC | Age: 77
End: 2017-08-09

## 2017-08-09 VITALS
HEART RATE: 80 BPM | SYSTOLIC BLOOD PRESSURE: 118 MMHG | OXYGEN SATURATION: 99 % | HEIGHT: 66 IN | WEIGHT: 156.4 LBS | DIASTOLIC BLOOD PRESSURE: 74 MMHG | BODY MASS INDEX: 25.13 KG/M2

## 2017-08-09 DIAGNOSIS — K21.9 GASTROESOPHAGEAL REFLUX DISEASE WITHOUT ESOPHAGITIS: Primary | ICD-10-CM

## 2017-08-09 DIAGNOSIS — J30.89 SEASONAL ALLERGIC RHINITIS DUE TO OTHER ALLERGIC TRIGGER: ICD-10-CM

## 2017-08-09 PROCEDURE — 99213 OFFICE O/P EST LOW 20 MIN: CPT | Performed by: NURSE PRACTITIONER

## 2017-08-09 RX ORDER — LORATADINE 10 MG/1
10 TABLET ORAL DAILY
Qty: 30 TABLET | Refills: 2 | Status: SHIPPED | OUTPATIENT
Start: 2017-08-09 | End: 2018-04-01

## 2017-08-09 RX ORDER — FLUTICASONE PROPIONATE 50 MCG
2 SPRAY, SUSPENSION (ML) NASAL DAILY
Qty: 1 EACH | Refills: 0 | Status: SHIPPED | OUTPATIENT
Start: 2017-08-09 | End: 2017-09-08

## 2017-08-09 NOTE — PROGRESS NOTES
Subjective   Tessa Fisher is a 76 y.o. female.     Chief Complaint: Establish Care    History of Present Illness   Pt here to establish care.  Pt has not been actually seeing a primary care provider.  She has been seeing Dr Phelps, general surgeon.   She has had a colonoscopy and EGD couple of months ago.  Normal findings.  Patient does have a history of GERD and has been taking PPIs for the past several years.  Patient states that she also has heartburn with the burning sensation and burps a lot of gas in the midsternal chest area that is worse when she lies down at night.  She also has been told that she has a hiatal hernia.  She had seen Dr. Phelps regarding a white coating on her tongue and he told her she had thrush and had given her a prescription for Diflucan which she has completed. She apparently had called him again and asked for refill on diflucan and he did call her in another prescription.  She states that she has a sore throat, tongue and roof of her mouth also.    She states that she also has a little wound at top of her mouth from eating cherries.  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   After discussion with patient today, I feel that she would benefit from magic mouthwash due to her symptoms.      Pt states that she also had an ear infection a few weeks ago.  Was given prescription for antiobitc from urgent care and completely finished the antibiotics.  She states that her symptoms are better but she still has a lot of ear pressure bilaterally.  She denies fever, cough, congestion, n/v/d.  She does have some dizziness from time to time also.       Pt states that she has had right leg pain and swelling.  Pt states that she was weedeating and twisted her right knee; this occurred about two weeks ago.  She went to ER and had xray of knee.  Patient states that the ER personnel suggested she follow with Dr. Vega, orthopedist.  Pt has made an appointment to follow with   Page in the next week.  She denies any N/T in the extremity and she is ambulating without any difficulty.       Family History   Problem Relation Age of Onset   • Cancer Mother      Melanoma   • Cancer Father    • Diabetes Neg Hx    • Heart disease Neg Hx    • Hypertension Neg Hx        Social History     Social History   • Marital status:      Spouse name: N/A   • Number of children: N/A   • Years of education: N/A     Occupational History   • Not on file.     Social History Main Topics   • Smoking status: Former Smoker     Packs/day: 3.00     Years: 25.00     Quit date: 1975   • Smokeless tobacco: Never Used   • Alcohol use No   • Drug use: No   • Sexual activity: Defer     Other Topics Concern   • Not on file     Social History Narrative       Past Medical History:   Diagnosis Date   • GERD (gastroesophageal reflux disease)    • Hiatal hernia    • LBBB (left bundle branch block)    • Melanoma        Review of Systems   Constitutional: Negative.    HENT:        Sore mouth and throat   Respiratory: Negative.    Cardiovascular: Negative.    Gastrointestinal:        Acid reflux   Genitourinary: Negative.    Musculoskeletal: Positive for arthralgias.   Neurological: Negative.    Psychiatric/Behavioral: Negative.        Objective   Physical Exam   Constitutional: She is oriented to person, place, and time. She appears well-developed and well-nourished.   HENT:   Erythematous tongue and pharnyx; torus palanitis   Neck: Normal range of motion. Neck supple.   Cardiovascular: Normal rate, regular rhythm and normal heart sounds.    Pulmonary/Chest: Effort normal and breath sounds normal.   Musculoskeletal:   Tenderness to right knee; swelling and edema in right lower extremity 2+   Neurological: She is alert and oriented to person, place, and time.   Skin: Skin is warm and dry.   Psychiatric: She has a normal mood and affect. Her behavior is normal. Judgment and thought content normal.   Nursing note and vitals  "reviewed.      Procedures    Vitals: Blood pressure 118/74, pulse 80, height 66\" (167.6 cm), weight 156 lb 6.4 oz (70.9 kg), SpO2 99 %, not currently breastfeeding.    Allergies: No Known Allergies       Assessment/Plan   Tessa was seen today for establish care.    Diagnoses and all orders for this visit:    Gastroesophageal reflux disease without esophagitis    Seasonal allergic rhinitis due to other allergic trigger  -     loratadine (CLARITIN) 10 MG tablet; Take 1 tablet by mouth Daily.  -     fluticasone (FLONASE) 50 MCG/ACT nasal spray; 2 sprays into each nostril Daily for 30 days.               "

## 2017-08-10 ENCOUNTER — TRANSCRIBE ORDERS (OUTPATIENT)
Dept: ADMINISTRATIVE | Facility: HOSPITAL | Age: 77
End: 2017-08-10

## 2017-08-10 DIAGNOSIS — M25.561 RIGHT KNEE PAIN, UNSPECIFIED CHRONICITY: Primary | ICD-10-CM

## 2017-08-12 ENCOUNTER — HOSPITAL ENCOUNTER (OUTPATIENT)
Dept: MRI IMAGING | Facility: HOSPITAL | Age: 77
Discharge: HOME | End: 2017-08-12
Admitting: ORTHOPAEDIC SURGERY

## 2017-08-12 DIAGNOSIS — M25.561 RIGHT KNEE PAIN, UNSPECIFIED CHRONICITY: ICD-10-CM

## 2017-08-12 PROCEDURE — 73721 MRI JNT OF LWR EXTRE W/O DYE: CPT | Performed by: RADIOLOGY

## 2017-08-12 PROCEDURE — 73721 MRI JNT OF LWR EXTRE W/O DYE: CPT

## 2017-10-06 ENCOUNTER — TELEPHONE (OUTPATIENT)
Dept: FAMILY MEDICINE CLINIC | Facility: CLINIC | Age: 77
End: 2017-10-06

## 2017-10-06 RX ORDER — FAMOTIDINE 40 MG/1
40 TABLET, FILM COATED ORAL 2 TIMES DAILY
Qty: 60 TABLET | Refills: 5 | Status: SHIPPED | OUTPATIENT
Start: 2017-10-06 | End: 2018-04-13 | Stop reason: SDUPTHER

## 2017-10-06 NOTE — TELEPHONE ENCOUNTER
Pt called requests medication for gerd. Pt stated protonix does not work. I spoke with Shilpa and she sent in pepcid to the pharmacy. I advised pt that med was sent, I also advised pt to stop taking protonix. Pt verbalized understanding.

## 2018-04-01 ENCOUNTER — APPOINTMENT (OUTPATIENT)
Dept: GENERAL RADIOLOGY | Facility: HOSPITAL | Age: 78
End: 2018-04-01

## 2018-04-01 ENCOUNTER — HOSPITAL ENCOUNTER (INPATIENT)
Facility: HOSPITAL | Age: 78
LOS: 4 days | Discharge: HOME | End: 2018-04-05
Attending: FAMILY MEDICINE | Admitting: INTERNAL MEDICINE

## 2018-04-01 DIAGNOSIS — R55 SYNCOPE, UNSPECIFIED SYNCOPE TYPE: Primary | ICD-10-CM

## 2018-04-01 DIAGNOSIS — I44.7 LBBB (LEFT BUNDLE BRANCH BLOCK): ICD-10-CM

## 2018-04-01 DIAGNOSIS — R00.1 BRADYCARDIA ON ECG: ICD-10-CM

## 2018-04-01 LAB
ALBUMIN SERPL-MCNC: 3.9 G/DL (ref 3.4–4.8)
ALBUMIN/GLOB SERPL: 1.4 G/DL (ref 1.5–2.5)
ALP SERPL-CCNC: 82 U/L (ref 35–104)
ALT SERPL W P-5'-P-CCNC: 45 U/L (ref 10–36)
ANION GAP SERPL CALCULATED.3IONS-SCNC: 6.5 MMOL/L (ref 3.6–11.2)
AST SERPL-CCNC: 31 U/L (ref 10–30)
BASOPHILS # BLD AUTO: 0.04 10*3/MM3 (ref 0–0.3)
BASOPHILS NFR BLD AUTO: 0.5 % (ref 0–2)
BILIRUB SERPL-MCNC: 0.6 MG/DL (ref 0.2–1.8)
BNP SERPL-MCNC: 45 PG/ML (ref 0–100)
BUN BLD-MCNC: 10 MG/DL (ref 7–21)
BUN/CREAT SERPL: 14.5 (ref 7–25)
CALCIUM SPEC-SCNC: 9.6 MG/DL (ref 7.7–10)
CHLORIDE SERPL-SCNC: 106 MMOL/L (ref 99–112)
CO2 SERPL-SCNC: 25.5 MMOL/L (ref 24.3–31.9)
CREAT BLD-MCNC: 0.69 MG/DL (ref 0.43–1.29)
DEPRECATED RDW RBC AUTO: 44.6 FL (ref 37–54)
EOSINOPHIL # BLD AUTO: 0.11 10*3/MM3 (ref 0–0.7)
EOSINOPHIL NFR BLD AUTO: 1.4 % (ref 0–7)
ERYTHROCYTE [DISTWIDTH] IN BLOOD BY AUTOMATED COUNT: 13.6 % (ref 11.5–14.5)
GFR SERPL CREATININE-BSD FRML MDRD: 83 ML/MIN/1.73
GLOBULIN UR ELPH-MCNC: 2.8 GM/DL
GLUCOSE BLD-MCNC: 128 MG/DL (ref 70–110)
GLUCOSE BLDC GLUCOMTR-MCNC: 135 MG/DL (ref 70–130)
HCT VFR BLD AUTO: 40.4 % (ref 37–47)
HGB BLD-MCNC: 13.5 G/DL (ref 12–16)
IMM GRANULOCYTES # BLD: 0.01 10*3/MM3 (ref 0–0.03)
IMM GRANULOCYTES NFR BLD: 0.1 % (ref 0–0.5)
LYMPHOCYTES # BLD AUTO: 1.75 10*3/MM3 (ref 1–3)
LYMPHOCYTES NFR BLD AUTO: 22.4 % (ref 16–46)
MAGNESIUM SERPL-MCNC: 2.2 MG/DL (ref 1.7–2.6)
MCH RBC QN AUTO: 30.3 PG (ref 27–33)
MCHC RBC AUTO-ENTMCNC: 33.4 G/DL (ref 33–37)
MCV RBC AUTO: 90.6 FL (ref 80–94)
MONOCYTES # BLD AUTO: 0.68 10*3/MM3 (ref 0.1–0.9)
MONOCYTES NFR BLD AUTO: 8.7 % (ref 0–12)
NEUTROPHILS # BLD AUTO: 5.22 10*3/MM3 (ref 1.4–6.5)
NEUTROPHILS NFR BLD AUTO: 66.9 % (ref 40–75)
OSMOLALITY SERPL CALC.SUM OF ELEC: 276.4 MOSM/KG (ref 273–305)
PHOSPHATE SERPL-MCNC: 3.3 MG/DL (ref 2.7–4.5)
PLATELET # BLD AUTO: 241 10*3/MM3 (ref 130–400)
PMV BLD AUTO: 10.1 FL (ref 6–10)
POTASSIUM BLD-SCNC: 3.8 MMOL/L (ref 3.5–5.3)
PROT SERPL-MCNC: 6.7 G/DL (ref 6–8)
RBC # BLD AUTO: 4.46 10*6/MM3 (ref 4.2–5.4)
SODIUM BLD-SCNC: 138 MMOL/L (ref 135–153)
TROPONIN I SERPL-MCNC: <0.006 NG/ML
TROPONIN I SERPL-MCNC: <0.006 NG/ML
WBC NRBC COR # BLD: 7.81 10*3/MM3 (ref 4.5–12.5)

## 2018-04-01 PROCEDURE — 84100 ASSAY OF PHOSPHORUS: CPT | Performed by: FAMILY MEDICINE

## 2018-04-01 PROCEDURE — 71045 X-RAY EXAM CHEST 1 VIEW: CPT | Performed by: RADIOLOGY

## 2018-04-01 PROCEDURE — 93010 ELECTROCARDIOGRAM REPORT: CPT | Performed by: INTERNAL MEDICINE

## 2018-04-01 PROCEDURE — 99285 EMERGENCY DEPT VISIT HI MDM: CPT

## 2018-04-01 PROCEDURE — 82962 GLUCOSE BLOOD TEST: CPT

## 2018-04-01 PROCEDURE — 84484 ASSAY OF TROPONIN QUANT: CPT | Performed by: FAMILY MEDICINE

## 2018-04-01 PROCEDURE — 93005 ELECTROCARDIOGRAM TRACING: CPT | Performed by: FAMILY MEDICINE

## 2018-04-01 PROCEDURE — 71045 X-RAY EXAM CHEST 1 VIEW: CPT

## 2018-04-01 PROCEDURE — 80053 COMPREHEN METABOLIC PANEL: CPT | Performed by: FAMILY MEDICINE

## 2018-04-01 PROCEDURE — 83735 ASSAY OF MAGNESIUM: CPT | Performed by: FAMILY MEDICINE

## 2018-04-01 PROCEDURE — 83880 ASSAY OF NATRIURETIC PEPTIDE: CPT | Performed by: FAMILY MEDICINE

## 2018-04-01 PROCEDURE — 85025 COMPLETE CBC W/AUTO DIFF WBC: CPT | Performed by: FAMILY MEDICINE

## 2018-04-01 PROCEDURE — 99223 1ST HOSP IP/OBS HIGH 75: CPT | Performed by: INTERNAL MEDICINE

## 2018-04-01 PROCEDURE — G0378 HOSPITAL OBSERVATION PER HR: HCPCS

## 2018-04-01 RX ORDER — FAMOTIDINE 20 MG/1
40 TABLET, FILM COATED ORAL DAILY
Status: DISCONTINUED | OUTPATIENT
Start: 2018-04-02 | End: 2018-04-05 | Stop reason: HOSPADM

## 2018-04-01 RX ORDER — NITROGLYCERIN 0.4 MG/1
0.4 TABLET SUBLINGUAL
Status: DISCONTINUED | OUTPATIENT
Start: 2018-04-01 | End: 2018-04-05 | Stop reason: HOSPADM

## 2018-04-01 RX ORDER — SODIUM CHLORIDE 0.9 % (FLUSH) 0.9 %
1-10 SYRINGE (ML) INJECTION AS NEEDED
Status: DISCONTINUED | OUTPATIENT
Start: 2018-04-01 | End: 2018-04-05 | Stop reason: HOSPADM

## 2018-04-01 RX ORDER — HEPARIN SODIUM 5000 [USP'U]/ML
5000 INJECTION, SOLUTION INTRAVENOUS; SUBCUTANEOUS EVERY 12 HOURS SCHEDULED
Status: DISCONTINUED | OUTPATIENT
Start: 2018-04-01 | End: 2018-04-05

## 2018-04-01 RX ORDER — SODIUM CHLORIDE 0.9 % (FLUSH) 0.9 %
10 SYRINGE (ML) INJECTION AS NEEDED
Status: DISCONTINUED | OUTPATIENT
Start: 2018-04-01 | End: 2018-04-05 | Stop reason: HOSPADM

## 2018-04-01 RX ORDER — CETIRIZINE HYDROCHLORIDE 10 MG/1
10 TABLET ORAL DAILY
Status: DISCONTINUED | OUTPATIENT
Start: 2018-04-02 | End: 2018-04-05 | Stop reason: HOSPADM

## 2018-04-01 RX ADMIN — SODIUM CHLORIDE 500 ML: 9 INJECTION, SOLUTION INTRAVENOUS at 20:32

## 2018-04-02 ENCOUNTER — APPOINTMENT (OUTPATIENT)
Dept: CARDIOLOGY | Facility: HOSPITAL | Age: 78
End: 2018-04-02
Attending: INTERNAL MEDICINE

## 2018-04-02 LAB
BH CV ECHO MEAS - % IVS THICK: -20.4 %
BH CV ECHO MEAS - % LVPW THICK: 19.8 %
BH CV ECHO MEAS - BSA(HAYCOCK): 1.9 M^2
BH CV ECHO MEAS - BSA: 1.8 M^2
BH CV ECHO MEAS - BZI_BMI: 26.6 KILOGRAMS/M^2
BH CV ECHO MEAS - BZI_METRIC_HEIGHT: 167.6 CM
BH CV ECHO MEAS - BZI_METRIC_WEIGHT: 74.8 KG
BH CV ECHO MEAS - CONTRAST EF 4CH: 66.7 ML/M^2
BH CV ECHO MEAS - EDV(CUBED): 85.7 ML
BH CV ECHO MEAS - EDV(MOD-SP4): 24 ML
BH CV ECHO MEAS - EDV(TEICH): 88.1 ML
BH CV ECHO MEAS - EF(CUBED): 65.9 %
BH CV ECHO MEAS - EF(MOD-SP4): 66.7 %
BH CV ECHO MEAS - EF(TEICH): 57.6 %
BH CV ECHO MEAS - ESV(CUBED): 29.2 ML
BH CV ECHO MEAS - ESV(MOD-SP4): 8 ML
BH CV ECHO MEAS - ESV(TEICH): 37.3 ML
BH CV ECHO MEAS - FS: 30.1 %
BH CV ECHO MEAS - IVS/LVPW: 1.1
BH CV ECHO MEAS - IVSD: 1.2 CM
BH CV ECHO MEAS - IVSS: 0.96 CM
BH CV ECHO MEAS - LV DIASTOLIC VOL/BSA (35-75): 13 ML/M^2
BH CV ECHO MEAS - LV MASS(C)D: 178.5 GRAMS
BH CV ECHO MEAS - LV MASS(C)DI: 96.8 GRAMS/M^2
BH CV ECHO MEAS - LV MASS(C)S: 102.3 GRAMS
BH CV ECHO MEAS - LV MASS(C)SI: 55.5 GRAMS/M^2
BH CV ECHO MEAS - LV SYSTOLIC VOL/BSA (12-30): 4.3 ML/M^2
BH CV ECHO MEAS - LVIDD: 4.4 CM
BH CV ECHO MEAS - LVIDS: 3.1 CM
BH CV ECHO MEAS - LVLD AP4: 4.6 CM
BH CV ECHO MEAS - LVLS AP4: 4.5 CM
BH CV ECHO MEAS - LVPWD: 1.1 CM
BH CV ECHO MEAS - LVPWS: 1.3 CM
BH CV ECHO MEAS - RVDD: 1.4 CM
BH CV ECHO MEAS - SI(CUBED): 30.6 ML/M^2
BH CV ECHO MEAS - SI(MOD-SP4): 8.7 ML/M^2
BH CV ECHO MEAS - SI(TEICH): 27.5 ML/M^2
BH CV ECHO MEAS - SV(CUBED): 56.5 ML
BH CV ECHO MEAS - SV(MOD-SP4): 16 ML
BH CV ECHO MEAS - SV(TEICH): 50.8 ML
CK MB SERPL-CCNC: 0.61 NG/ML (ref 0–5)
CK MB SERPL-CCNC: 0.65 NG/ML (ref 0–5)
CK MB SERPL-CCNC: 0.84 NG/ML (ref 0–5)
CK MB SERPL-RTO: 1 % (ref 0–3)
CK SERPL-CCNC: 49 U/L (ref 24–173)
CK SERPL-CCNC: 62 U/L (ref 24–173)
CK SERPL-CCNC: 62 U/L (ref 24–173)
LV EF 2D ECHO EST: 65 %
MYOGLOBIN SERPL-MCNC: 33 NG/ML (ref 0–109)
MYOGLOBIN SERPL-MCNC: 34 NG/ML (ref 0–109)
MYOGLOBIN SERPL-MCNC: 35 NG/ML (ref 0–109)
TROPONIN I SERPL-MCNC: <0.006 NG/ML

## 2018-04-02 PROCEDURE — 94799 UNLISTED PULMONARY SVC/PX: CPT

## 2018-04-02 PROCEDURE — 99222 1ST HOSP IP/OBS MODERATE 55: CPT | Performed by: INTERNAL MEDICINE

## 2018-04-02 PROCEDURE — 93971 EXTREMITY STUDY: CPT

## 2018-04-02 PROCEDURE — 83874 ASSAY OF MYOGLOBIN: CPT | Performed by: INTERNAL MEDICINE

## 2018-04-02 PROCEDURE — 82553 CREATINE MB FRACTION: CPT | Performed by: INTERNAL MEDICINE

## 2018-04-02 PROCEDURE — 93308 TTE F-UP OR LMTD: CPT | Performed by: INTERNAL MEDICINE

## 2018-04-02 PROCEDURE — 93971 EXTREMITY STUDY: CPT | Performed by: RADIOLOGY

## 2018-04-02 PROCEDURE — 82550 ASSAY OF CK (CPK): CPT | Performed by: INTERNAL MEDICINE

## 2018-04-02 PROCEDURE — 84484 ASSAY OF TROPONIN QUANT: CPT | Performed by: INTERNAL MEDICINE

## 2018-04-02 PROCEDURE — 93308 TTE F-UP OR LMTD: CPT

## 2018-04-02 PROCEDURE — 99232 SBSQ HOSP IP/OBS MODERATE 35: CPT | Performed by: HOSPITALIST

## 2018-04-02 RX ADMIN — FAMOTIDINE 40 MG: 20 TABLET, FILM COATED ORAL at 08:39

## 2018-04-02 RX ADMIN — CETIRIZINE HYDROCHLORIDE 10 MG: 10 TABLET ORAL at 08:39

## 2018-04-03 ENCOUNTER — APPOINTMENT (OUTPATIENT)
Dept: GENERAL RADIOLOGY | Facility: HOSPITAL | Age: 78
End: 2018-04-03

## 2018-04-03 PROBLEM — R00.1 SYMPTOMATIC BRADYCARDIA: Status: ACTIVE | Noted: 2018-04-03

## 2018-04-03 PROBLEM — J95.811 IATROGENIC PNEUMOTHORAX: Status: ACTIVE | Noted: 2018-04-03

## 2018-04-03 LAB
ANION GAP SERPL CALCULATED.3IONS-SCNC: 11.1 MMOL/L (ref 3.6–11.2)
BUN BLD-MCNC: 10 MG/DL (ref 7–21)
BUN/CREAT SERPL: 13.9 (ref 7–25)
CALCIUM SPEC-SCNC: 9.5 MG/DL (ref 7.7–10)
CHLORIDE SERPL-SCNC: 106 MMOL/L (ref 99–112)
CO2 SERPL-SCNC: 23.9 MMOL/L (ref 24.3–31.9)
CREAT BLD-MCNC: 0.72 MG/DL (ref 0.43–1.29)
DEPRECATED RDW RBC AUTO: 46.2 FL (ref 37–54)
ERYTHROCYTE [DISTWIDTH] IN BLOOD BY AUTOMATED COUNT: 13.9 % (ref 11.5–14.5)
GFR SERPL CREATININE-BSD FRML MDRD: 79 ML/MIN/1.73
GLUCOSE BLD-MCNC: 94 MG/DL (ref 70–110)
HCT VFR BLD AUTO: 43.4 % (ref 37–47)
HGB BLD-MCNC: 14.1 G/DL (ref 12–16)
MCH RBC QN AUTO: 29.6 PG (ref 27–33)
MCHC RBC AUTO-ENTMCNC: 32.5 G/DL (ref 33–37)
MCV RBC AUTO: 91.2 FL (ref 80–94)
OSMOLALITY SERPL CALC.SUM OF ELEC: 280.1 MOSM/KG (ref 273–305)
PLATELET # BLD AUTO: 234 10*3/MM3 (ref 130–400)
PMV BLD AUTO: 10.4 FL (ref 6–10)
POTASSIUM BLD-SCNC: 3.9 MMOL/L (ref 3.5–5.3)
RBC # BLD AUTO: 4.76 10*6/MM3 (ref 4.2–5.4)
SODIUM BLD-SCNC: 141 MMOL/L (ref 135–153)
WBC NRBC COR # BLD: 6.81 10*3/MM3 (ref 4.5–12.5)

## 2018-04-03 PROCEDURE — 71045 X-RAY EXAM CHEST 1 VIEW: CPT | Performed by: RADIOLOGY

## 2018-04-03 PROCEDURE — 99222 1ST HOSP IP/OBS MODERATE 55: CPT | Performed by: INTERNAL MEDICINE

## 2018-04-03 PROCEDURE — 71045 X-RAY EXAM CHEST 1 VIEW: CPT

## 2018-04-03 PROCEDURE — 80048 BASIC METABOLIC PNL TOTAL CA: CPT | Performed by: INTERNAL MEDICINE

## 2018-04-03 PROCEDURE — 94799 UNLISTED PULMONARY SVC/PX: CPT

## 2018-04-03 PROCEDURE — 85027 COMPLETE CBC AUTOMATED: CPT | Performed by: INTERNAL MEDICINE

## 2018-04-03 PROCEDURE — 33208 INSRT HEART PM ATRIAL & VENT: CPT | Performed by: INTERNAL MEDICINE

## 2018-04-03 PROCEDURE — 02H63JZ INSERTION OF PACEMAKER LEAD INTO RIGHT ATRIUM, PERCUTANEOUS APPROACH: ICD-10-PCS | Performed by: INTERNAL MEDICINE

## 2018-04-03 PROCEDURE — 0JH606Z INSERTION OF PACEMAKER, DUAL CHAMBER INTO CHEST SUBCUTANEOUS TISSUE AND FASCIA, OPEN APPROACH: ICD-10-PCS | Performed by: INTERNAL MEDICINE

## 2018-04-03 PROCEDURE — C1898 LEAD, PMKR, OTHER THAN TRANS: HCPCS

## 2018-04-03 PROCEDURE — 25010000002 VANCOMYCIN PER 500 MG

## 2018-04-03 PROCEDURE — C1898 LEAD, PMKR, OTHER THAN TRANS: HCPCS | Performed by: INTERNAL MEDICINE

## 2018-04-03 PROCEDURE — 99233 SBSQ HOSP IP/OBS HIGH 50: CPT | Performed by: HOSPITALIST

## 2018-04-03 PROCEDURE — 0 IOPAMIDOL PER 1 ML: Performed by: INTERNAL MEDICINE

## 2018-04-03 PROCEDURE — 02HL3JZ INSERTION OF PACEMAKER LEAD INTO LEFT VENTRICLE, PERCUTANEOUS APPROACH: ICD-10-PCS | Performed by: INTERNAL MEDICINE

## 2018-04-03 PROCEDURE — C1894 INTRO/SHEATH, NON-LASER: HCPCS | Performed by: INTERNAL MEDICINE

## 2018-04-03 PROCEDURE — C1785 PMKR, DUAL, RATE-RESP: HCPCS | Performed by: INTERNAL MEDICINE

## 2018-04-03 PROCEDURE — 99222 1ST HOSP IP/OBS MODERATE 55: CPT | Performed by: SURGERY

## 2018-04-03 PROCEDURE — 25010000002 FENTANYL CITRATE (PF) 100 MCG/2ML SOLUTION: Performed by: INTERNAL MEDICINE

## 2018-04-03 DEVICE — IMPLANTABLE DEVICE: Type: IMPLANTABLE DEVICE | Status: FUNCTIONAL

## 2018-04-03 DEVICE — PACEMAKER
Type: IMPLANTABLE DEVICE | Status: FUNCTIONAL
Brand: ESSENTIO™ DR

## 2018-04-03 RX ORDER — SODIUM CHLORIDE 9 MG/ML
INJECTION, SOLUTION INTRAVENOUS CONTINUOUS PRN
Status: DISCONTINUED | OUTPATIENT
Start: 2018-04-03 | End: 2018-04-03 | Stop reason: HOSPADM

## 2018-04-03 RX ORDER — VANCOMYCIN HCL 900 MCG/MG
POWDER (GRAM) MISCELLANEOUS AS NEEDED
Status: DISCONTINUED | OUTPATIENT
Start: 2018-04-03 | End: 2018-04-03 | Stop reason: HOSPADM

## 2018-04-03 RX ORDER — FENTANYL CITRATE 50 UG/ML
INJECTION, SOLUTION INTRAMUSCULAR; INTRAVENOUS AS NEEDED
Status: DISCONTINUED | OUTPATIENT
Start: 2018-04-03 | End: 2018-04-03 | Stop reason: HOSPADM

## 2018-04-03 RX ORDER — LIDOCAINE HYDROCHLORIDE 20 MG/ML
INJECTION, SOLUTION INFILTRATION; PERINEURAL AS NEEDED
Status: DISCONTINUED | OUTPATIENT
Start: 2018-04-03 | End: 2018-04-03 | Stop reason: HOSPADM

## 2018-04-03 RX ORDER — HYDROCODONE BITARTRATE AND ACETAMINOPHEN 5; 325 MG/1; MG/1
1 TABLET ORAL ONCE
Status: COMPLETED | OUTPATIENT
Start: 2018-04-03 | End: 2018-04-03

## 2018-04-03 RX ORDER — SODIUM CHLORIDE 0.9 % (FLUSH) 0.9 %
3 SYRINGE (ML) INJECTION EVERY 8 HOURS
Status: DISCONTINUED | OUTPATIENT
Start: 2018-04-03 | End: 2018-04-05 | Stop reason: HOSPADM

## 2018-04-03 RX ORDER — BACITRACIN, NEOMYCIN, POLYMYXIN B 400; 3.5; 5 [USP'U]/G; MG/G; [USP'U]/G
OINTMENT TOPICAL AS NEEDED
Status: DISCONTINUED | OUTPATIENT
Start: 2018-04-03 | End: 2018-04-03 | Stop reason: HOSPADM

## 2018-04-03 RX ORDER — SODIUM CHLORIDE 0.9 % (FLUSH) 0.9 %
5 SYRINGE (ML) INJECTION AS NEEDED
Status: DISCONTINUED | OUTPATIENT
Start: 2018-04-03 | End: 2018-04-05 | Stop reason: HOSPADM

## 2018-04-03 RX ORDER — SODIUM CHLORIDE 0.9 % (FLUSH) 0.9 %
1-10 SYRINGE (ML) INJECTION AS NEEDED
Status: DISCONTINUED | OUTPATIENT
Start: 2018-04-03 | End: 2018-04-05 | Stop reason: HOSPADM

## 2018-04-03 RX ADMIN — CETIRIZINE HYDROCHLORIDE 10 MG: 10 TABLET ORAL at 12:21

## 2018-04-03 RX ADMIN — HYDROCODONE BITARTRATE AND ACETAMINOPHEN 1 TABLET: 5; 325 TABLET ORAL at 12:20

## 2018-04-03 RX ADMIN — FAMOTIDINE 40 MG: 20 TABLET, FILM COATED ORAL at 12:21

## 2018-04-04 ENCOUNTER — APPOINTMENT (OUTPATIENT)
Dept: GENERAL RADIOLOGY | Facility: HOSPITAL | Age: 78
End: 2018-04-04

## 2018-04-04 LAB
INR PPP: 1.06 (ref 0.9–1.1)
PROTHROMBIN TIME: 13.9 SECONDS (ref 11–15.4)

## 2018-04-04 PROCEDURE — 85610 PROTHROMBIN TIME: CPT | Performed by: INTERNAL MEDICINE

## 2018-04-04 PROCEDURE — 99024 POSTOP FOLLOW-UP VISIT: CPT | Performed by: INTERNAL MEDICINE

## 2018-04-04 PROCEDURE — 99232 SBSQ HOSP IP/OBS MODERATE 35: CPT | Performed by: SURGERY

## 2018-04-04 PROCEDURE — 93010 ELECTROCARDIOGRAM REPORT: CPT | Performed by: INTERNAL MEDICINE

## 2018-04-04 PROCEDURE — 99232 SBSQ HOSP IP/OBS MODERATE 35: CPT | Performed by: HOSPITALIST

## 2018-04-04 PROCEDURE — 94799 UNLISTED PULMONARY SVC/PX: CPT

## 2018-04-04 PROCEDURE — 93005 ELECTROCARDIOGRAM TRACING: CPT | Performed by: INTERNAL MEDICINE

## 2018-04-04 PROCEDURE — 71045 X-RAY EXAM CHEST 1 VIEW: CPT | Performed by: RADIOLOGY

## 2018-04-04 PROCEDURE — 99231 SBSQ HOSP IP/OBS SF/LOW 25: CPT | Performed by: INTERNAL MEDICINE

## 2018-04-04 PROCEDURE — 71045 X-RAY EXAM CHEST 1 VIEW: CPT

## 2018-04-04 RX ORDER — SODIUM CHLORIDE 9 MG/ML
125 INJECTION, SOLUTION INTRAVENOUS CONTINUOUS
Status: DISPENSED | OUTPATIENT
Start: 2018-04-04 | End: 2018-04-05

## 2018-04-04 RX ORDER — SODIUM CHLORIDE 9 MG/ML
INJECTION, SOLUTION INTRAVENOUS
Status: COMPLETED
Start: 2018-04-04 | End: 2018-04-04

## 2018-04-04 RX ADMIN — Medication 3 ML: at 06:21

## 2018-04-04 RX ADMIN — SODIUM CHLORIDE 125 ML/HR: 9 INJECTION, SOLUTION INTRAVENOUS at 13:10

## 2018-04-04 RX ADMIN — CETIRIZINE HYDROCHLORIDE 10 MG: 10 TABLET ORAL at 08:53

## 2018-04-04 RX ADMIN — FAMOTIDINE 40 MG: 20 TABLET, FILM COATED ORAL at 08:53

## 2018-04-05 ENCOUNTER — APPOINTMENT (OUTPATIENT)
Dept: GENERAL RADIOLOGY | Facility: HOSPITAL | Age: 78
End: 2018-04-05

## 2018-04-05 VITALS
SYSTOLIC BLOOD PRESSURE: 139 MMHG | HEIGHT: 66 IN | HEART RATE: 70 BPM | OXYGEN SATURATION: 98 % | BODY MASS INDEX: 27.08 KG/M2 | DIASTOLIC BLOOD PRESSURE: 84 MMHG | TEMPERATURE: 98.1 F | RESPIRATION RATE: 14 BRPM | WEIGHT: 168.5 LBS

## 2018-04-05 LAB
ANION GAP SERPL CALCULATED.3IONS-SCNC: 6.6 MMOL/L (ref 3.6–11.2)
BUN BLD-MCNC: 12 MG/DL (ref 7–21)
BUN/CREAT SERPL: 18.2 (ref 7–25)
CALCIUM SPEC-SCNC: 9 MG/DL (ref 7.7–10)
CHLORIDE SERPL-SCNC: 107 MMOL/L (ref 99–112)
CO2 SERPL-SCNC: 24.4 MMOL/L (ref 24.3–31.9)
CREAT BLD-MCNC: 0.66 MG/DL (ref 0.43–1.29)
GFR SERPL CREATININE-BSD FRML MDRD: 87 ML/MIN/1.73
GLUCOSE BLD-MCNC: 104 MG/DL (ref 70–110)
OSMOLALITY SERPL CALC.SUM OF ELEC: 275.7 MOSM/KG (ref 273–305)
POTASSIUM BLD-SCNC: 3.9 MMOL/L (ref 3.5–5.3)
SODIUM BLD-SCNC: 138 MMOL/L (ref 135–153)

## 2018-04-05 PROCEDURE — 99024 POSTOP FOLLOW-UP VISIT: CPT | Performed by: INTERNAL MEDICINE

## 2018-04-05 PROCEDURE — 80048 BASIC METABOLIC PNL TOTAL CA: CPT | Performed by: INTERNAL MEDICINE

## 2018-04-05 PROCEDURE — 25010000002 ENOXAPARIN PER 10 MG: Performed by: INTERNAL MEDICINE

## 2018-04-05 PROCEDURE — 99231 SBSQ HOSP IP/OBS SF/LOW 25: CPT | Performed by: INTERNAL MEDICINE

## 2018-04-05 PROCEDURE — 71045 X-RAY EXAM CHEST 1 VIEW: CPT | Performed by: RADIOLOGY

## 2018-04-05 PROCEDURE — 99232 SBSQ HOSP IP/OBS MODERATE 35: CPT | Performed by: SURGERY

## 2018-04-05 PROCEDURE — 71045 X-RAY EXAM CHEST 1 VIEW: CPT

## 2018-04-05 PROCEDURE — 99238 HOSP IP/OBS DSCHRG MGMT 30/<: CPT | Performed by: HOSPITALIST

## 2018-04-05 PROCEDURE — 94799 UNLISTED PULMONARY SVC/PX: CPT

## 2018-04-05 PROCEDURE — 25010000002 HEPARIN (PORCINE) PER 1000 UNITS: Performed by: INTERNAL MEDICINE

## 2018-04-05 RX ORDER — CETIRIZINE HYDROCHLORIDE 10 MG/1
10 TABLET ORAL DAILY
Qty: 30 TABLET | Refills: 0 | Status: SHIPPED | OUTPATIENT
Start: 2018-04-06 | End: 2018-05-06

## 2018-04-05 RX ADMIN — SODIUM CHLORIDE 125 ML/HR: 9 INJECTION, SOLUTION INTRAVENOUS at 12:25

## 2018-04-05 RX ADMIN — CETIRIZINE HYDROCHLORIDE 10 MG: 10 TABLET ORAL at 08:42

## 2018-04-05 RX ADMIN — SODIUM CHLORIDE 125 ML/HR: 9 INJECTION, SOLUTION INTRAVENOUS at 02:34

## 2018-04-05 RX ADMIN — ENOXAPARIN SODIUM 40 MG: 40 INJECTION SUBCUTANEOUS at 13:46

## 2018-04-05 RX ADMIN — FAMOTIDINE 40 MG: 20 TABLET, FILM COATED ORAL at 08:42

## 2018-04-05 RX ADMIN — Medication 3 ML: at 02:34

## 2018-04-09 ENCOUNTER — OFFICE VISIT (OUTPATIENT)
Dept: PULMONOLOGY | Facility: CLINIC | Age: 78
End: 2018-04-09

## 2018-04-09 VITALS
SYSTOLIC BLOOD PRESSURE: 122 MMHG | HEART RATE: 71 BPM | WEIGHT: 161 LBS | DIASTOLIC BLOOD PRESSURE: 62 MMHG | OXYGEN SATURATION: 98 % | TEMPERATURE: 97.8 F | BODY MASS INDEX: 25.88 KG/M2 | HEIGHT: 66 IN

## 2018-04-09 DIAGNOSIS — Z87.891 FORMER SMOKER: ICD-10-CM

## 2018-04-09 DIAGNOSIS — Z87.09 HISTORY OF PNEUMOTHORAX: Primary | ICD-10-CM

## 2018-04-09 PROBLEM — Z95.0 PACEMAKER: Status: ACTIVE | Noted: 2018-04-09

## 2018-04-09 PROCEDURE — 99213 OFFICE O/P EST LOW 20 MIN: CPT | Performed by: NURSE PRACTITIONER

## 2018-04-09 NOTE — PROGRESS NOTES
Subjective    Tessa Fisher presents for the following Hospital Follow Up      History of Present Illness     Interval history since last visit:     Mrs. Fisher is a 77 year old female who was initially seen by pulmonary as inpatient when she suffered a pneumothorax post pacemaker placement. She was treated medically and has done well since. No shortness of breath, no distress noted.     Recent hospitalizations: Yes    Investigations (imaging, PFT's, labs, sleep study, record requests, etc.) Tests done in hospital     Have you had the Influenza Vaccine? no   Would you like to receive this Vaccine today? no    Have you had the Pneumonia Vaccine?  no  Would you like to receive this Vaccine today? no    Review of Systems   Constitutional: Negative for activity change, fatigue and unexpected weight change.   HENT: Negative for congestion, postnasal drip and rhinorrhea.    Respiratory: Negative for apnea, cough, chest tightness, shortness of breath and wheezing.    Cardiovascular: Negative for chest pain and palpitations.   Gastrointestinal: Negative for nausea.   Allergic/Immunologic: Negative for environmental allergies.   Psychiatric/Behavioral: Negative for agitation and confusion.       Active Problems:  Problem List Items Addressed This Visit        Other    Former smoker    History of pneumothorax - Primary      Other Visit Diagnoses    None.         Past Medical History:  Past Medical History:   Diagnosis Date   • Basal cell carcinoma 03/04/2016    Left groin   • Diastolic congestive heart failure    • GERD (gastroesophageal reflux disease)    • Hiatal hernia    • LBBB (left bundle branch block)    • Popliteal cyst, right        Family History:  Family History   Problem Relation Age of Onset   • Cancer Mother      Basal cell cancer of the skin    • Cancer Father    • Cancer Brother      Basal cell cancer of the skin   • Diabetes Neg Hx    • Heart disease Neg Hx    • Hypertension Neg Hx        Social  "History:  Social History   Substance Use Topics   • Smoking status: Former Smoker     Packs/day: 3.00     Years: 25.00     Quit date: 1975   • Smokeless tobacco: Never Used   • Alcohol use No       Current Medications:  Current Outpatient Prescriptions   Medication Sig Dispense Refill   • cetirizine (zyrTEC) 10 MG tablet Take 1 tablet by mouth Daily for 30 days. 30 tablet 0   • famotidine (PEPCID) 40 MG tablet Take 1 tablet by mouth 2 (Two) Times a Day. 60 tablet 5     No current facility-administered medications for this visit.        Allergies:  No Known Allergies    Vitals:  /62   Pulse 71   Temp 97.8 °F (36.6 °C) (Oral)   Ht 167.6 cm (66\")   Wt 73 kg (161 lb)   SpO2 98%   BMI 25.99 kg/m²     Imaging:    Imaging Results (most recent)     None          Pulmonary Functions Testing Results:    No results found for: FEV1, FVC, AWL4AFA, TLC, DLCO    Results for orders placed or performed during the hospital encounter of 04/01/18   Comprehensive Metabolic Panel   Result Value Ref Range    Glucose 128 (H) 70 - 110 mg/dL    BUN 10 7 - 21 mg/dL    Creatinine 0.69 0.43 - 1.29 mg/dL    Sodium 138 135 - 153 mmol/L    Potassium 3.8 3.5 - 5.3 mmol/L    Chloride 106 99 - 112 mmol/L    CO2 25.5 24.3 - 31.9 mmol/L    Calcium 9.6 7.7 - 10.0 mg/dL    Total Protein 6.7 6.0 - 8.0 g/dL    Albumin 3.90 3.40 - 4.80 g/dL    ALT (SGPT) 45 (H) 10 - 36 U/L    AST (SGOT) 31 (H) 10 - 30 U/L    Alkaline Phosphatase 82 35 - 104 U/L    Total Bilirubin 0.6 0.2 - 1.8 mg/dL    eGFR Non African Amer 83 >60 mL/min/1.73    Globulin 2.8 gm/dL    A/G Ratio 1.4 (L) 1.5 - 2.5 g/dL    BUN/Creatinine Ratio 14.5 7.0 - 25.0    Anion Gap 6.5 3.6 - 11.2 mmol/L   Troponin   Result Value Ref Range    Troponin I <0.006 <=0.040 ng/mL   BNP   Result Value Ref Range    BNP 45.0 0.0 - 100.0 pg/mL   Magnesium   Result Value Ref Range    Magnesium 2.2 1.7 - 2.6 mg/dL   Phosphorus   Result Value Ref Range    Phosphorus 3.3 2.7 - 4.5 mg/dL   CBC Auto " Differential   Result Value Ref Range    WBC 7.81 4.50 - 12.50 10*3/mm3    RBC 4.46 4.20 - 5.40 10*6/mm3    Hemoglobin 13.5 12.0 - 16.0 g/dL    Hematocrit 40.4 37.0 - 47.0 %    MCV 90.6 80.0 - 94.0 fL    MCH 30.3 27.0 - 33.0 pg    MCHC 33.4 33.0 - 37.0 g/dL    RDW 13.6 11.5 - 14.5 %    RDW-SD 44.6 37.0 - 54.0 fl    MPV 10.1 (H) 6.0 - 10.0 fL    Platelets 241 130 - 400 10*3/mm3    Neutrophil % 66.9 40.0 - 75.0 %    Lymphocyte % 22.4 16.0 - 46.0 %    Monocyte % 8.7 0.0 - 12.0 %    Eosinophil % 1.4 0.0 - 7.0 %    Basophil % 0.5 0.0 - 2.0 %    Immature Grans % 0.1 0.0 - 0.5 %    Neutrophils, Absolute 5.22 1.40 - 6.50 10*3/mm3    Lymphocytes, Absolute 1.75 1.00 - 3.00 10*3/mm3    Monocytes, Absolute 0.68 0.10 - 0.90 10*3/mm3    Eosinophils, Absolute 0.11 0.00 - 0.70 10*3/mm3    Basophils, Absolute 0.04 0.00 - 0.30 10*3/mm3    Immature Grans, Absolute 0.01 0.00 - 0.03 10*3/mm3   Osmolality, Calculated   Result Value Ref Range    Osmolality Calc 276.4 273.0 - 305.0 mOsm/kg   Troponin   Result Value Ref Range    Troponin I <0.006 <=0.040 ng/mL   CK-MB   Result Value Ref Range    CKMB 0.65 0.00 - 5.00 ng/mL   CK-MB   Result Value Ref Range    CKMB 0.84 0.00 - 5.00 ng/mL   CK   Result Value Ref Range    Creatine Kinase 62 24 - 173 U/L   CK   Result Value Ref Range    Creatine Kinase 62 24 - 173 U/L   Myoglobin, Serum   Result Value Ref Range    Myoglobin 34.0 0.0 - 109.0 ng/mL   Myoglobin, Serum   Result Value Ref Range    Myoglobin 35.0 0.0 - 109.0 ng/mL   Troponin   Result Value Ref Range    Troponin I <0.006 <=0.040 ng/mL   Troponin   Result Value Ref Range    Troponin I <0.006 <=0.040 ng/mL   CK-MB Index   Result Value Ref Range    CK-MB Index 1.0 0.0 - 3.0 %   CK   Result Value Ref Range    Creatine Kinase 49 24 - 173 U/L   CK-MB   Result Value Ref Range    CKMB 0.61 0.00 - 5.00 ng/mL   Myoglobin, Serum   Result Value Ref Range    Myoglobin 33.0 0.0 - 109.0 ng/mL   Troponin   Result Value Ref Range    Troponin I <0.006  <=0.040 ng/mL   CBC (No Diff)   Result Value Ref Range    WBC 6.81 4.50 - 12.50 10*3/mm3    RBC 4.76 4.20 - 5.40 10*6/mm3    Hemoglobin 14.1 12.0 - 16.0 g/dL    Hematocrit 43.4 37.0 - 47.0 %    MCV 91.2 80.0 - 94.0 fL    MCH 29.6 27.0 - 33.0 pg    MCHC 32.5 (L) 33.0 - 37.0 g/dL    RDW 13.9 11.5 - 14.5 %    RDW-SD 46.2 37.0 - 54.0 fl    MPV 10.4 (H) 6.0 - 10.0 fL    Platelets 234 130 - 400 10*3/mm3   Basic Metabolic Panel   Result Value Ref Range    Glucose 94 70 - 110 mg/dL    BUN 10 7 - 21 mg/dL    Creatinine 0.72 0.43 - 1.29 mg/dL    Sodium 141 135 - 153 mmol/L    Potassium 3.9 3.5 - 5.3 mmol/L    Chloride 106 99 - 112 mmol/L    CO2 23.9 (L) 24.3 - 31.9 mmol/L    Calcium 9.5 7.7 - 10.0 mg/dL    eGFR Non African Amer 79 >60 mL/min/1.73    BUN/Creatinine Ratio 13.9 7.0 - 25.0    Anion Gap 11.1 3.6 - 11.2 mmol/L   Osmolality, Calculated   Result Value Ref Range    Osmolality Calc 280.1 273.0 - 305.0 mOsm/kg   Protime-INR   Result Value Ref Range    Protime 13.9 11.0 - 15.4 Seconds    INR 1.06 0.90 - 1.10   Basic Metabolic Panel   Result Value Ref Range    Glucose 104 70 - 110 mg/dL    BUN 12 7 - 21 mg/dL    Creatinine 0.66 0.43 - 1.29 mg/dL    Sodium 138 135 - 153 mmol/L    Potassium 3.9 3.5 - 5.3 mmol/L    Chloride 107 99 - 112 mmol/L    CO2 24.4 24.3 - 31.9 mmol/L    Calcium 9.0 7.7 - 10.0 mg/dL    eGFR Non African Amer 87 >60 mL/min/1.73    BUN/Creatinine Ratio 18.2 7.0 - 25.0    Anion Gap 6.6 3.6 - 11.2 mmol/L   Osmolality, Calculated   Result Value Ref Range    Osmolality Calc 275.7 273.0 - 305.0 mOsm/kg   POC Glucose Once   Result Value Ref Range    Glucose 135 (H) 70 - 130 mg/dL   Adult Transthoracic Echo Limited W/ Cont if Necessary Per Protocol   Result Value Ref Range    BSA 1.8 m^2     CV ECHO DIANE - RVDD 1.4 cm    IVSd 1.2 cm    IVSs 0.96 cm    LVIDd 4.4 cm    LVIDs 3.1 cm    LVPWd 1.1 cm     CV ECHO DIANE - LVPWS 1.3 cm    IVS/LVPW 1.1     FS 30.1 %    EDV(Teich) 88.1 ml    ESV(Teich) 37.3 ml     EF(Teich) 57.6 %    EDV(cubed) 85.7 ml    ESV(cubed) 29.2 ml    EF(cubed) 65.9 %    % IVS thick -20.4 %    % LVPW thick 19.8 %    LV mass(C)d 178.5 grams    LV mass(C)dI 96.8 grams/m^2    LV mass(C)s 102.3 grams    LV mass(C)sI 55.5 grams/m^2    SV(Teich) 50.8 ml    SI(Teich) 27.5 ml/m^2    SV(cubed) 56.5 ml    SI(cubed) 30.6 ml/m^2    LVLd ap4 4.6 cm    EDV(MOD-sp4) 24.0 ml    LVLs ap4 4.5 cm    ESV(MOD-sp4) 8.0 ml    EF(MOD-sp4) 66.7 %    SV(MOD-sp4) 16.0 ml    SI(MOD-sp4) 8.7 ml/m^2    CONTRAST EF 4CH 66.7 ml/m^2    LV Diastolic corrected for BSA 13.0 ml/m^2    LV Systolic corrected for BSA 4.3 ml/m^2    BH CV ECHO DIANE - BZI_BMI 26.6 kilograms/m^2     CV ECHO DIANE - BSA(HAYCOCK) 1.9 m^2     CV ECHO DIANE - BZI_METRIC_WEIGHT 74.8 kg     CV ECHO DIANE - BZI_METRIC_HEIGHT 167.6 cm    Echo EF Estimated 65 %       Objective   Physical Exam   Constitutional: She is oriented to person, place, and time. She appears well-developed.   HENT:   Head: Normocephalic.   Eyes: Pupils are equal, round, and reactive to light.   Neck: Normal range of motion.   Cardiovascular: Normal rate, regular rhythm and normal heart sounds.    Pulmonary/Chest: Effort normal and breath sounds normal.   Abdominal: Soft.   Musculoskeletal: Normal range of motion.   Neurological: She is alert and oriented to person, place, and time.   Skin: Skin is warm and dry.   Psychiatric: She has a normal mood and affect. Her behavior is normal. Judgment and thought content normal.   Vitals reviewed.      Assessment/Plan      Pneumothorax-post procedure: no shortness of breath, no chest pain. Pneumo had resolved and she is not having any symptoms of spontaneous pneumo a this time.     Former smoker: she quit smoking in the 70's, congratulated her on this huge success.       ICD-10-CM ICD-9-CM   1. History of pneumothorax Z87.09 V12.69   2. Former smoker Z87.891 V15.82       Return in about 6 months (around 10/9/2018).

## 2018-04-10 ENCOUNTER — CLINICAL SUPPORT (OUTPATIENT)
Dept: CARDIOLOGY | Facility: CLINIC | Age: 78
End: 2018-04-10

## 2018-04-10 NOTE — PATIENT INSTRUCTIONS
Pacemaker Implantation, Care After  Refer to this sheet in the next few weeks. These instructions provide you with information about caring for yourself after your procedure. Your health care provider may also give you more specific instructions. Your treatment has been planned according to current medical practices, but problems sometimes occur. Call your health care provider if you have any problems or questions after your procedure.  What can I expect after the procedure?  After the procedure, it is common to have:  · Mild pain or soreness in your chest for several days.  · A small amount of blood or clear fluid coming from your incision.  · A slight bump in your chest where the pulse generator was placed. You may be able to feel the generator under your skin. This is normal.  Follow these instructions at home:  Medicines   · Take over-the-counter and prescription medicines only as told by your health care provider.  · Do not take any new medicines without asking your health care provider first.  · If you were prescribed an antibiotic medicine, take it as told by your health care provider. Do not stop taking the antibiotic even if you start to feel better.  Incision care     · Keep your incision area clean and dry.  · Follow instructions from your health care provider about how to take care of your incision. Make sure you:  ¨ Wash your hands with soap and water before you change your bandage (dressing). If soap and water are not available, use hand .  ¨ Change your dressing as told by your health care provider.  ¨ Leave stitches (sutures), skin glue, or adhesive strips in place. These skin closures may need to stay in place for 2 weeks or longer. If adhesive strip edges start to loosen and curl up, you may trim the loose edges. Do not remove adhesive strips completely unless your health care provider tells you to do that.  · Check your incision area every day for signs of infection. Check for:  ¨ More  "redness, swelling, or pain.  ¨ More fluid or blood.  ¨ Warmth.  ¨ Pus or a bad smell.  Activity   · Return to your normal activities as told by your health care provider. Ask your health care provider what activities are safe for you.  · Do not lift anything that is heavier than 10 lb (4.5 kg) until your health care provider approves.  · Do not lift your upper arms above your shoulders for at least 6 weeks or as long as told by your health care provider.  ¨ If you sleep with your arms above your head, wear an arm restraint while you sleep to prevent this from happening.  ¨ Avoid sudden movements that pull your upper arms far away from your body for at least 6 weeks.  · Do a mild form of exercise at least once a day. As you feel better, you may exercise more.  · Gently stretch your shoulders at least once a day to help prevent stiffness in your chest.  Electricity and Magnetic Fields   · Avoid places and objects that have a strong electric or magnetic field. This includes:  ¨ Airport security checkpoints. When you are at the airport, tell officials that you have a pacemaker and show them your pacemaker identification card. Officials will check you in safely so that your pacemaker is not damaged. Do not allow magnetic wands to be waved near your pacemaker. That can make the pacemaker stop working.  ¨ Metal detectors. If you must pass through a metal detector, walk through it quickly. Do not stop under the detector or stand near it.  ¨ Power plants.  ¨ Large electrical generators.  ¨ Radiofrequency transmission towers, such as cell phone and radio towers.  · Do not use amateur (\"ham\") radio equipment or electric (\"arc\") welding torches. If you are not sure whether something is safe to use, ask your health care provider.  ¨ Some devices may be safe to use if you hold them at least 1 ft (0.3 m) from your pacemaker. These devices may include power tools, lawn mowers, and speakers.  · When you talk on your cell phone, hold " it to your ear that is opposite from the side that your pacemaker is on. Do not leave your cell phone in a pocket over your pacemaker.  Long-Term Care   · Carry your pacemaker identification card with you at all times, especially when you travel.  · Consider wearing a medical alert bracelet or necklace that explains your pacemaker and any heart conditions you have.  · Tell all health care providers who care for you that you have a pacemaker. This may prevent you from having an MRI because of the strong magnets used during that test.  · Have your pacemaker checked every 3-6 months or as often as told by your health care provider.  General instructions   · Do not use any tobacco products, such as cigarettes, chewing tobacco, or e-cigarettes. If you need help quitting, ask your health care provider.  · Do not drive or operate heavy machinery while taking prescription pain medicine.  · Do not take baths, swim, or use a hot tub until your health care provider approves.  · Follow instructions from your health care provider about eating or drinking restrictions.  · Weigh yourself every day and write down your weight.  · Keep all follow-up visits as told by your health care provider. This is important.  Contact a health care provider if:  · You suddenly gain 3 lb (1.4 kg) or more in 24 hours.  · You have swelling in your feet or legs.  · You have an irregular heartbeat (palpitations).  · You have more redness, swelling, or pain around your incision.  · You have more fluid or blood coming from your incision.  · Your incision area feels warm to the touch.  · You have pus or a bad smell coming from your incision.  Get help right away if:  · You have chest pain.  · You have difficulty breathing.  · You suddenly feel light-headed.  · You have a fever.  · You faint.  These symptoms may represent a serious problem that is an emergency. Do not wait to see if the symptoms will go away. Get medical help right away. Call your local  emergency services (911 in the [place] Do not drive yourself to the hospital.  This information is not intended to replace advice given to you by your health care provider. Make sure you discuss any questions you have with your health care provider.  Document Released: 09/11/2013 Document Revised: 05/25/2017 Document Reviewed: 09/11/2016  [employer] Interactive Patient Education © 2017 [employer]

## 2018-04-10 NOTE — PROGRESS NOTES
DATE: 04/10/2018    PATIENT NAME:  Tessa Fisher    : 1940    DATE OF PACEMAKER IMP:   4/3/2018    NUMBER OF STAPLES:  8    APPEARANCE OF PACEMAKER SITE:  Good; no drainage noted.  Wound closed.      AREA CLEANED WITH:  Peroxide    ANTIBIOTIC OINTMENT APPLIED:   No    SITE COVERED WITH:  3 steri strips    DONE BY:  Yulissa Muro CMA    PATINT INSTRUCTED ABOUT PACEMAKER CHECKS:     RETURN APPT:  2018 w/Dr. Gautam

## 2018-04-13 ENCOUNTER — OFFICE VISIT (OUTPATIENT)
Dept: FAMILY MEDICINE CLINIC | Facility: CLINIC | Age: 78
End: 2018-04-13

## 2018-04-13 VITALS
OXYGEN SATURATION: 98 % | DIASTOLIC BLOOD PRESSURE: 75 MMHG | WEIGHT: 162.8 LBS | HEIGHT: 66 IN | SYSTOLIC BLOOD PRESSURE: 115 MMHG | HEART RATE: 67 BPM | BODY MASS INDEX: 26.16 KG/M2

## 2018-04-13 DIAGNOSIS — K21.9 GASTROESOPHAGEAL REFLUX DISEASE WITHOUT ESOPHAGITIS: ICD-10-CM

## 2018-04-13 DIAGNOSIS — Z95.0 S/P PLACEMENT OF CARDIAC PACEMAKER: Primary | ICD-10-CM

## 2018-04-13 PROCEDURE — 99213 OFFICE O/P EST LOW 20 MIN: CPT | Performed by: NURSE PRACTITIONER

## 2018-04-13 RX ORDER — FAMOTIDINE 40 MG/1
40 TABLET, FILM COATED ORAL 2 TIMES DAILY PRN
Qty: 60 TABLET | Refills: 5 | Status: SHIPPED | OUTPATIENT
Start: 2018-04-13 | End: 2018-05-24 | Stop reason: ALTCHOICE

## 2018-04-13 NOTE — PROGRESS NOTES
Subjective   Tessa Fisher is a 77 y.o. female.     Chief Complaint: Pacemaker Check (recent pacemaker placement; f/up with primary care) and Follow-up    Heartburn   She complains of heartburn. She reports no abdominal pain, no dysphagia or no nausea. This is a chronic problem. The current episode started more than 1 year ago. The problem occurs occasionally. The problem has been waxing and waning. The heartburn is of moderate intensity. The heartburn does not wake her from sleep. The heartburn does not limit her activity. The heartburn doesn't change with position. The symptoms are aggravated by certain foods. There are no known risk factors. She has tried a PPI for the symptoms. The treatment provided significant relief.      Patient was having episodes of feeling very weak and having syncopal episodes and went to the emergency room on 4/1/2018.  She was found to be having symptomatic bradycardia at the time.  Her daughter states that her heart rate was 32 at the time.  Patient was admitted to [facility] and underwent a placement of a permanent pacemaker.  Apparently patient experienced a pneumothorax during the hospitalization.  Patient states that she was told she needed to have a chest tube placed while she was hospitalized.  Patient states that she refused to have the chest tube placed.  She was put on oxygen and apparently her pneumothorax did resolve.  Patient was discharged on 4/6/2018.  Since her discharge from the hospital, she states that she has been doing well.  She has had no more issues with syncope or Gen. weakness.  She states that she has been resting and not doing any strenuous activities as she was told.  Her pacemaker placement/surgical wound is healing well.  She is not having any pain at the site.  She has had no more issues with shortness of breath.  She does have a follow-up appointment scheduled with Dr. Gautam, cardiologist.  Her daughter is with her today and states that  she can tell a difference in her mother.  She states that her mother seems to have more energy and seems to be feeling better than before the pacemaker placement.  She has had no further symptoms of bradycardia.      Family History   Problem Relation Age of Onset   • Cancer Mother      Basal cell cancer of the skin    • Cancer Father    • Cancer Brother      Basal cell cancer of the skin   • Diabetes Neg Hx    • Heart disease Neg Hx    • Hypertension Neg Hx        Social History     Social History   • Marital status:      Spouse name: N/A   • Number of children: N/A   • Years of education: N/A     Occupational History   • Not on file.     Social History Main Topics   • Smoking status: Former Smoker     Packs/day: 3.00     Years: 25.00     Quit date: 1975   • Smokeless tobacco: Never Used   • Alcohol use No   • Drug use: No   • Sexual activity: Defer     Other Topics Concern   • Not on file     Social History Narrative   • No narrative on file       Past Medical History:   Diagnosis Date   • Basal cell carcinoma 03/04/2016    Left groin   • Diastolic congestive heart failure    • GERD (gastroesophageal reflux disease)    • Hiatal hernia    • LBBB (left bundle branch block)    • Popliteal cyst, right        Review of Systems   Constitutional: Negative.    HENT: Negative.    Respiratory: Negative.    Cardiovascular: Negative.    Gastrointestinal: Positive for heartburn. Negative for abdominal pain, dysphagia and nausea.   Musculoskeletal: Negative.    Skin: Negative.    Neurological: Negative.    Psychiatric/Behavioral: Negative.        Objective   Physical Exam   Constitutional: She is oriented to person, place, and time. She appears well-developed and well-nourished.   Neck: Normal range of motion. Neck supple.   Cardiovascular: Normal rate, regular rhythm and normal heart sounds.    Pulmonary/Chest: Effort normal and breath sounds normal.   Neurological: She is alert and oriented to person, place, and time.  "  Skin: Skin is warm and dry.   Surgical wound up her left chest area approximated well; no drainage noted; 3 Steri-Strips in place; no swelling noted at site   Psychiatric: She has a normal mood and affect. Her behavior is normal. Judgment and thought content normal.   Nursing note and vitals reviewed.      Procedures    Vitals: Blood pressure 115/75, pulse 67, height 167.6 cm (66\"), weight 73.8 kg (162 lb 12.8 oz), SpO2 98 %, not currently breastfeeding.    Allergies: No Known Allergies     During this visit the following were done:  Labs Reviewed []    Labs Ordered []    Radiology Reports Reviewed []    Radiology Ordered []    PCP Records Reviewed []    Referring Provider Records Reviewed []    ER Records Reviewed []    Hospital Records Reviewed [x]    History Obtained From Family []    Radiology Images Reviewed []    Other Reviewed []    Records Requested []      Assessment/Plan   Tessa was seen today for pacemaker check and follow-up.    Diagnoses and all orders for this visit:    S/P placement of cardiac pacemaker    Gastroesophageal reflux disease without esophagitis  -     famotidine (PEPCID) 40 MG tablet; Take 1 tablet by mouth 2 (Two) Times a Day As Needed for Heartburn.      Continue to follow with cardiologist as scheduled          "

## 2018-05-24 ENCOUNTER — OFFICE VISIT (OUTPATIENT)
Dept: CARDIOLOGY | Facility: CLINIC | Age: 78
End: 2018-05-24

## 2018-05-24 VITALS
WEIGHT: 163 LBS | BODY MASS INDEX: 26.2 KG/M2 | HEART RATE: 83 BPM | OXYGEN SATURATION: 95 % | DIASTOLIC BLOOD PRESSURE: 70 MMHG | HEIGHT: 66 IN | SYSTOLIC BLOOD PRESSURE: 114 MMHG

## 2018-05-24 DIAGNOSIS — I44.1 SECOND DEGREE AV BLOCK, MOBITZ TYPE II: Primary | ICD-10-CM

## 2018-05-24 PROCEDURE — 93000 ELECTROCARDIOGRAM COMPLETE: CPT | Performed by: INTERNAL MEDICINE

## 2018-05-24 PROCEDURE — 99024 POSTOP FOLLOW-UP VISIT: CPT | Performed by: INTERNAL MEDICINE

## 2018-05-24 NOTE — PROGRESS NOTES
HEENA Spain  Tessa Fisher  1940 05/24/2018    Patient Active Problem List   Diagnosis   • Gastroesophageal reflux disease without esophagitis   • Right leg pain   • Generalized weakness   • Near syncope   • Screening   • Syncope   • Symptomatic bradycardia   • Iatrogenic pneumothorax   • Former smoker   • History of pneumothorax   • Pacemaker   • Second degree AV block, Mobitz type II, status post permanent pacemaker implantation with a [employer] device on 4/3/18.       Dear HEENA Spain:    Subjective     Tessa Fisher is a 77 y.o. female with the problems as listed above, presents    Component: Follow-up of pacemaker implantation    History of Present Illness: Ms. Fisher is a pleasant 77 year old  female with a symptomatic bradycardia with syncope for which she has had permanent dual-chamber pacemaker implanted in early April 2018.  She is here for follow-up.  She has had a left apical pneumothorax post implantation which resolved with conservative management. She denies any shortness of breath and has been doing better since discharge.  She has not had any further episodes of syncope.  She complains of intermittent palpitations with rapid heart beat with no associated dizziness.    No Known Allergies:    No current outpatient prescriptions on file.    The following portions of the patient's history were reviewed and updated as appropriate: allergies, current medications, past family history, past medical history, past social history, past surgical history and problem list.    Social History   Substance Use Topics   • Smoking status: Former Smoker     Packs/day: 3.00     Years: 25.00     Quit date: 1975   • Smokeless tobacco: Never Used   • Alcohol use No       Review of Systems   Constitution: Negative for chills and fever.   HENT: Negative for nosebleeds and sore throat.    Cardiovascular: Positive for palpitations.   Respiratory: Negative for  "cough, hemoptysis and wheezing.    Gastrointestinal: Negative for abdominal pain, hematemesis, hematochezia, melena, nausea and vomiting.   Genitourinary: Negative for dysuria and hematuria.   Neurological: Negative for headaches.       Objective   Vitals:    05/24/18 1540   BP: 114/70   BP Location: Left arm   Patient Position: Sitting   Cuff Size: Adult   Pulse: 83   SpO2: 95%   Weight: 73.9 kg (163 lb)   Height: 167.6 cm (66\")     Body mass index is 26.31 kg/m².        Physical Exam   Constitutional: She is oriented to person, place, and time. She appears well-developed and well-nourished.   HENT:   Mouth/Throat: Oropharynx is clear and moist.   Eyes: EOM are normal. Pupils are equal, round, and reactive to light.   Neck: Neck supple. No JVD present. No tracheal deviation present. No thyromegaly present.   Cardiovascular: Normal rate, regular rhythm, S1 normal and S2 normal.  Exam reveals no gallop and no friction rub.    No murmur heard.  Pulmonary/Chest: Effort normal and breath sounds normal.       Pacemaker site seems to be healing well.   Abdominal: Soft. Bowel sounds are normal. She exhibits no mass. There is no tenderness.   Musculoskeletal: Normal range of motion. She exhibits no edema.   Lymphadenopathy:     She has no cervical adenopathy.   Neurological: She is alert and oriented to person, place, and time.   Skin: Skin is warm and dry. No rash noted.   Psychiatric: She has a normal mood and affect.       Lab Results   Component Value Date     04/05/2018    K 3.9 04/05/2018     04/05/2018    CO2 24.4 04/05/2018    BUN 12 04/05/2018    CREATININE 0.66 04/05/2018    GLUCOSE 104 04/05/2018    CALCIUM 9.0 04/05/2018    AST 31 (H) 04/01/2018    ALT 45 (H) 04/01/2018    ALKPHOS 82 04/01/2018    LABIL2 1.4 (L) 04/01/2018     Lab Results   Component Value Date    CKTOTAL 49 04/02/2018     Lab Results   Component Value Date    WBC 6.81 04/03/2018    HGB 14.1 04/03/2018    HCT 43.4 04/03/2018    PLT " 234 04/03/2018     Lab Results   Component Value Date    INR 1.06 04/04/2018    INR 0.94 04/16/2017     Lab Results   Component Value Date    MG 2.2 04/01/2018     Lab Results   Component Value Date    TSH 2.641 04/18/2017    TRIG 87 04/17/2017    HDL 53 (L) 04/17/2017    LDL 83 04/17/2017      Lab Results   Component Value Date    BNP 45.0 04/01/2018         ECG 12 Lead  Date/Time: 5/24/2018 3:34 PM  Performed by: RICO FITZGERALD  Authorized by: RICO FITZGERALD   Rhythm: sinus rhythm  Conduction: left bundle branch block  Comments: Appropriate atrial and ventricular sensing and pacing.              Assessment/Plan :  1. Second degree AV block, Mobitz type II, With symptomatic bradycardia status post permanent pacemaker implantation with a [employer] device on 4/3/18, clinically improved.        Recommendations:  1. We will enroll her in pacemaker clinic.    2. Follow-up in a couple of months    Return in about 2 months (around 7/24/2018).    As always, I appreciate very much the opportunity to participate in the cardiovascular care of your patients.      With Best Regards,    HEENA Lopez disclaimer:  Much of this encounter note is an electronic transcription/translation of spoken language to printed text. The electronic translation of spoken language may permit erroneous, or at times, nonsensical words or phrases to be inadvertently transcribed; Although I have reviewed the note for such errors, some may still exist.

## 2018-06-15 ENCOUNTER — TREATMENT (OUTPATIENT)
Dept: CARDIOLOGY | Facility: CLINIC | Age: 78
End: 2018-06-15

## 2018-06-15 DIAGNOSIS — I49.5 SSS (SICK SINUS SYNDROME) (HCC): Primary | ICD-10-CM

## 2018-06-15 PROCEDURE — 93293 PM PHONE R-STRIP DEVICE EVAL: CPT | Performed by: INTERNAL MEDICINE

## 2018-09-21 ENCOUNTER — TREATMENT (OUTPATIENT)
Dept: CARDIOLOGY | Facility: CLINIC | Age: 78
End: 2018-09-21

## 2018-09-21 DIAGNOSIS — I49.5 SSS (SICK SINUS SYNDROME) (HCC): Primary | ICD-10-CM

## 2018-09-21 PROCEDURE — 93293 PM PHONE R-STRIP DEVICE EVAL: CPT | Performed by: INTERNAL MEDICINE

## 2018-10-19 DIAGNOSIS — R06.02 SOB (SHORTNESS OF BREATH): Primary | ICD-10-CM

## 2018-10-21 ENCOUNTER — HOSPITAL ENCOUNTER (OUTPATIENT)
Dept: GENERAL RADIOLOGY | Facility: HOSPITAL | Age: 78
Discharge: HOME | End: 2018-10-21
Attending: INTERNAL MEDICINE | Admitting: INTERNAL MEDICINE

## 2018-10-21 DIAGNOSIS — R06.02 SOB (SHORTNESS OF BREATH): ICD-10-CM

## 2018-10-21 PROCEDURE — 71046 X-RAY EXAM CHEST 2 VIEWS: CPT

## 2018-10-21 PROCEDURE — 71046 X-RAY EXAM CHEST 2 VIEWS: CPT | Performed by: RADIOLOGY

## 2018-10-22 DIAGNOSIS — R06.02 SOB (SHORTNESS OF BREATH): Primary | ICD-10-CM

## 2018-10-25 ENCOUNTER — OFFICE VISIT (OUTPATIENT)
Dept: PULMONOLOGY | Facility: CLINIC | Age: 78
End: 2018-10-25

## 2018-10-25 VITALS
OXYGEN SATURATION: 99 % | WEIGHT: 168 LBS | HEIGHT: 66 IN | SYSTOLIC BLOOD PRESSURE: 128 MMHG | TEMPERATURE: 97.7 F | BODY MASS INDEX: 27 KG/M2 | HEART RATE: 68 BPM | DIASTOLIC BLOOD PRESSURE: 78 MMHG

## 2018-10-25 DIAGNOSIS — Z87.09 HISTORY OF PNEUMOTHORAX: ICD-10-CM

## 2018-10-25 DIAGNOSIS — R06.02 SOB (SHORTNESS OF BREATH): Primary | ICD-10-CM

## 2018-10-25 LAB
FEV1: 2.43 LITERS
FVC VOL RESPIRATORY: 2.97 LITERS

## 2018-10-25 PROCEDURE — 99213 OFFICE O/P EST LOW 20 MIN: CPT | Performed by: INTERNAL MEDICINE

## 2018-10-25 PROCEDURE — 94010 BREATHING CAPACITY TEST: CPT | Performed by: INTERNAL MEDICINE

## 2018-10-25 ASSESSMENT — PULMONARY FUNCTION TESTS
FEV1: 2.43
FVC: 2.97

## 2018-10-25 NOTE — PROGRESS NOTES
"History of Present Illness 77-year-old lady had to pacemaker Placement April 3 resulting in a Pneumothorax about 25% left upper lung that was a discussion about putting her chest to prevent that she Was reluctant however that decreased in size on follow-up chest x-ray on April 25 come back for a follow-up as she was seen by Dr. Mcguire that has left his practice in [place].  She Used to smoke some has not smoked for 35 years      Review of Systems and denies having any Shortness of breath or chest pain being followed by cardiology for pacemaker    Active Problems:  Problem List Items Addressed This Visit        Other    History of pneumothorax      Other Visit Diagnoses     SOB (shortness of breath)    -  Primary    Relevant Orders    Pulmonary Function Test (Completed)          Past Medical History:  Past Medical History:   Diagnosis Date   • Basal cell carcinoma 03/04/2016    Left groin   • Diastolic congestive heart failure (CMS/HCC)    • GERD (gastroesophageal reflux disease)    • Hiatal hernia    • LBBB (left bundle branch block)    • Pacemaker    • Popliteal cyst, right        Family History:  Family History   Problem Relation Age of Onset   • Cancer Mother         Basal cell cancer of the skin    • Cancer Father    • Cancer Brother         Basal cell cancer of the skin   • Diabetes Neg Hx    • Heart disease Neg Hx    • Hypertension Neg Hx        Social History:  Social History   Substance Use Topics   • Smoking status: Former Smoker     Packs/day: 3.00     Years: 25.00     Quit date: 1975   • Smokeless tobacco: Never Used   • Alcohol use No       Current Medications:  No current outpatient prescriptions on file.     No current facility-administered medications for this visit.        Allergies:  No Known Allergies    Vitals:  /78   Pulse 68   Temp 97.7 °F (36.5 °C)   Ht 167.6 cm (66\")   Wt 76.2 kg (168 lb)   SpO2 99%   BMI 27.12 kg/m²     Physical Exam no acute distress vital signs stable lungs clear " heart regular O2 sat 99% on room air    Imaging: Chest x-ray of April 3 showed 25% pneumothorax in the left upper lung field slightly decreased on follow-up on the 25th air on October 20 118    PFT: Normal today FVC of 98 FEV1 of 107%  Results for orders placed or performed in visit on 10/25/18   Pulmonary Function Test   Result Value Ref Range    FEV1 2.43 liters    FVC 2.97 liters           ASSESSMENT AND DIAGNOSIS: Pneumothorax iatrogenic April 3, 2018 due to pacemaker placement that has resolved    Clinician review the old x-rays explained to her that her lungs are okay however advised her to get annual flu vaccine and pneumonia vaccine per protocol that she seems to be reluctant    Follow-Up: Primary care and cardiology and return to us in case of any chest related problems

## 2018-12-05 ENCOUNTER — CLINICAL SUPPORT (OUTPATIENT)
Dept: CARDIOLOGY | Facility: CLINIC | Age: 78
End: 2018-12-05

## 2018-12-05 DIAGNOSIS — I49.5 SSS (SICK SINUS SYNDROME) (HCC): Primary | ICD-10-CM

## 2018-12-05 PROCEDURE — 93280 PM DEVICE PROGR EVAL DUAL: CPT | Performed by: INTERNAL MEDICINE

## 2019-01-09 ENCOUNTER — OFFICE VISIT (OUTPATIENT)
Dept: CARDIOLOGY | Facility: CLINIC | Age: 79
End: 2019-01-09

## 2019-01-09 VITALS
HEIGHT: 66 IN | SYSTOLIC BLOOD PRESSURE: 140 MMHG | BODY MASS INDEX: 27.16 KG/M2 | OXYGEN SATURATION: 97 % | DIASTOLIC BLOOD PRESSURE: 72 MMHG | WEIGHT: 169 LBS | HEART RATE: 67 BPM

## 2019-01-09 DIAGNOSIS — R07.2 PRECORDIAL PAIN: ICD-10-CM

## 2019-01-09 DIAGNOSIS — I44.1 SECOND DEGREE AV BLOCK, MOBITZ TYPE II: Primary | ICD-10-CM

## 2019-01-09 PROCEDURE — 99214 OFFICE O/P EST MOD 30 MIN: CPT | Performed by: PHYSICIAN ASSISTANT

## 2019-01-09 PROCEDURE — 93000 ELECTROCARDIOGRAM COMPLETE: CPT | Performed by: PHYSICIAN ASSISTANT

## 2019-01-09 RX ORDER — FUROSEMIDE 20 MG/1
20 TABLET ORAL DAILY PRN
Qty: 30 TABLET | Refills: 3 | Status: SHIPPED | OUTPATIENT
Start: 2019-01-09 | End: 2019-04-02

## 2019-01-09 NOTE — PROGRESS NOTES
Shilpa Perrin APRN  Tessa Fisher  1940 01/09/2019    Patient Active Problem List   Diagnosis   • Gastroesophageal reflux disease without esophagitis   • Right leg pain   • Generalized weakness   • Near syncope   • Screening   • Syncope   • Symptomatic bradycardia   • Iatrogenic pneumothorax   • Former smoker   • History of pneumothorax   • Pacemaker   • Second degree AV block, Mobitz type II, status post permanent pacemaker implantation with a [employer] device on 4/3/18.   • Precordial pain     Dear Shilpa Perrin APRN:    Subjective     History of Present Illness:    Chief Complaint   Patient presents with   • LBBB   • Follow-up     6 month S/P pacer implantation       Tessa Fisher is a pleasant 78 y.o. female with a past medical history significant for second-degree type II AV block status post permanent dual-chamber pacemaker [employer] plantar April 2018.  She comes in for routine cardiology follow-up.    Patient is complaining of upper epigastric pain just under her sternum. She describes the pain as a pressure and burning sensation. She denies any radiation of the pain. She does state that she weed eats and push mows her own yard that does cause this pressure to hurt slightly more. She denies any shortness of breath or diaphoresis. She rates the pressure a 3/10. She does state that this pain will last roughly 30 minutes she also reports relaxing will improve this and laying down will make it worse. She reports that this pressure typically comes on at random. She not taken in H2 blockers or other GERD medicines but does report a hiatal hernia that has been found in the past.     She is also complaining of some pedal edema that is bilateral and has been there for several weeks.  She states that she has tried elevating her legs as well as compression stockings she states compression stockings have helped some elevating the legs does not.  She denies any shortness of breath,  "orthopnea, or PND.    No Known Allergies:    No current outpatient medications on file.    The following portions of the patient's history were reviewed and updated as appropriate: allergies, current medications, past family history, past medical history, past social history, past surgical history and problem list.    Social History     Tobacco Use   • Smoking status: Former Smoker     Packs/day: 3.00     Years: 25.00     Pack years: 75.00     Last attempt to quit: 1975     Years since quittin.0   • Smokeless tobacco: Never Used   Substance Use Topics   • Alcohol use: No   • Drug use: No       Review of Systems   Constitution: Negative for weakness and malaise/fatigue.   Cardiovascular: Positive for leg swelling (bilateral). Negative for chest pain, dyspnea on exertion and irregular heartbeat.   Respiratory: Negative for cough and shortness of breath.    Hematologic/Lymphatic: Negative for bleeding problem. Does not bruise/bleed easily.   Gastrointestinal: Positive for bloating. Negative for nausea and vomiting.        Mid chest burning, states GERD         Objective   Vitals:    19 1341   BP: 140/72   BP Location: Right arm   Patient Position: Sitting   Cuff Size: Adult   Pulse: 67   SpO2: 97%   Weight: 76.7 kg (169 lb)   Height: 167.6 cm (66\")     Body mass index is 27.28 kg/m².    Physical Exam   Constitutional: She is oriented to person, place, and time. She appears well-developed and well-nourished. No distress.   HENT:   Head: Normocephalic and atraumatic.   Cardiovascular: Normal rate, regular rhythm, normal heart sounds and intact distal pulses.   Pulmonary/Chest: Effort normal and breath sounds normal. No respiratory distress.   Musculoskeletal: She exhibits no edema.   Neurological: She is alert and oriented to person, place, and time.   Skin: She is not diaphoretic.       Lab Results   Component Value Date     2018    K 3.9 2018     2018    CO2 24.4 2018    " BUN 12 04/05/2018    CREATININE 0.66 04/05/2018    GLUCOSE 104 04/05/2018    CALCIUM 9.0 04/05/2018    AST 31 (H) 04/01/2018    ALT 45 (H) 04/01/2018    ALKPHOS 82 04/01/2018    LABIL2 1.5 05/29/2015     Lab Results   Component Value Date    CKTOTAL 49 04/02/2018     Lab Results   Component Value Date    WBC 6.81 04/03/2018    HGB 14.1 04/03/2018    HCT 43.4 04/03/2018     04/03/2018     Lab Results   Component Value Date    INR 1.06 04/04/2018    INR 0.94 04/16/2017     Lab Results   Component Value Date    MG 2.2 04/01/2018     Lab Results   Component Value Date    TSH 2.641 04/18/2017    TRIG 87 04/17/2017    HDL 53 (L) 04/17/2017    LDL 83 04/17/2017      Lab Results   Component Value Date    BNP 45.0 04/01/2018       During this visit the following were done:  Labs Reviewed [x]    Labs Ordered []    Radiology Reports Reviewed [x]    Radiology Ordered []    PCP Records Reviewed []    Referring Provider Records Reviewed []    ER Records Reviewed []    Hospital Records Reviewed []    History Obtained From Family []    Radiology Images Reviewed []    Other Reviewed []    Records Requested []         ECG 12 Lead  Date/Time: 1/9/2019 1:47 PM  Performed by: Lc Mackey PA-C  Authorized by: Lc Mackey PA-C   Rhythm: sinus rhythm  Conduction: left bundle branch block and 1st degree  Clinical impression: abnormal ECG and non-specific ECG  Comments: Appropriately AV sensing  with ventricular rate of 72              Assessment/Plan    Diagnosis Plan   1. Second degree AV block, Mobitz type II, status post permanent pacemaker implantation with a [employer] device on 4/3/18.     2. Precordial pain          Recommendations:  1. Since patient does have a transthoracic echocardiogram that did reveal some diastolic dysfunction in the past and she does have 1+ pedal edema prescribed her 20 mg of Lasix to take as needed and I'll check a BMP in 1 week.  I emphasized the importance of getting her  blood drawn in a week to monitor kidney function 1 starting this medication as well as potassium she expressed understanding.  2. For chest pains I did suggest for her to undergo a stress test with myocardial perfusion since they are somewhat typical with atypical features of possible ischemia even though she has had a stress test little over a year ago that was unremarkable at that time.  However, she declined have this done at this time thinking that its more likely to be her GERD.   3. Advised her that if she gets the point where she is taking the Lasix daily and call her office that she may need potassium supplement.  4. Follow-up in one month.      Return in about 1 month (around 2/9/2019).    As always, I appreciate very much the opportunity to participate in the cardiovascular care of your patients.      With Best Regards,    GIGI Curry disclaimer:  Much of this encounter note is an electronic transcription/translation of spoken language to printed text. The electronic translation of spoken language may permit erroneous, or at times, nonsensical words or phrases to be inadvertently transcribed; Although I have reviewed the note for such errors, some may still exist.

## 2019-01-16 ENCOUNTER — LAB (OUTPATIENT)
Dept: LAB | Facility: HOSPITAL | Age: 79
End: 2019-01-16

## 2019-01-16 DIAGNOSIS — I44.1 SECOND DEGREE AV BLOCK, MOBITZ TYPE II: ICD-10-CM

## 2019-01-16 LAB
ANION GAP SERPL CALCULATED.3IONS-SCNC: 4.2 MMOL/L (ref 3.6–11.2)
BUN BLD-MCNC: 13 MG/DL (ref 7–21)
BUN/CREAT SERPL: 16.3 (ref 7–25)
CALCIUM SPEC-SCNC: 9.5 MG/DL (ref 7.7–10)
CHLORIDE SERPL-SCNC: 101 MMOL/L (ref 99–112)
CO2 SERPL-SCNC: 28.8 MMOL/L (ref 24.3–31.9)
CREAT BLD-MCNC: 0.8 MG/DL (ref 0.43–1.29)
GFR SERPL CREATININE-BSD FRML MDRD: 70 ML/MIN/1.73
GLUCOSE BLD-MCNC: 87 MG/DL (ref 70–110)
OSMOLALITY SERPL CALC.SUM OF ELEC: 267.7 MOSM/KG (ref 273–305)
POTASSIUM BLD-SCNC: 4.5 MMOL/L (ref 3.5–5.3)
SODIUM BLD-SCNC: 134 MMOL/L (ref 135–153)

## 2019-01-16 PROCEDURE — 36415 COLL VENOUS BLD VENIPUNCTURE: CPT

## 2019-01-16 PROCEDURE — 80048 BASIC METABOLIC PNL TOTAL CA: CPT

## 2019-02-01 ENCOUNTER — TELEPHONE (OUTPATIENT)
Dept: CARDIOLOGY | Facility: CLINIC | Age: 79
End: 2019-02-01

## 2019-02-01 DIAGNOSIS — R07.2 PRECORDIAL PAIN: Primary | ICD-10-CM

## 2019-02-01 NOTE — TELEPHONE ENCOUNTER
Pt called and stated that she does want to have the stress done now. I advised her that I will let Lc know. She expressed understanding.       She was also asking about her labs. I advised her of her lab results.   Result Notes for Basic Metabolic Panel     Notes recorded by Lc Mackey PA-C on 1/17/2019 at 1:08 PM EST  Blood work looks ok.

## 2019-02-01 NOTE — TELEPHONE ENCOUNTER
Called pt and advised her.   She stated that she only took the Lasix 4 days due to it causing her to be dizzy.

## 2019-02-28 ENCOUNTER — HOSPITAL ENCOUNTER (OUTPATIENT)
Dept: NUCLEAR MEDICINE | Facility: HOSPITAL | Age: 79
Discharge: HOME | End: 2019-02-28

## 2019-02-28 ENCOUNTER — HOSPITAL ENCOUNTER (OUTPATIENT)
Dept: CARDIOLOGY | Facility: HOSPITAL | Age: 79
Discharge: HOME | End: 2019-02-28

## 2019-02-28 DIAGNOSIS — R07.2 PRECORDIAL PAIN: ICD-10-CM

## 2019-02-28 PROCEDURE — A9500 TC99M SESTAMIBI: HCPCS | Performed by: PHYSICIAN ASSISTANT

## 2019-02-28 PROCEDURE — 0 TECHNETIUM SESTAMIBI: Performed by: PHYSICIAN ASSISTANT

## 2019-02-28 PROCEDURE — 78452 HT MUSCLE IMAGE SPECT MULT: CPT | Performed by: INTERNAL MEDICINE

## 2019-02-28 PROCEDURE — 25010000002 REGADENOSON 0.4 MG/5ML SOLUTION: Performed by: PHYSICIAN ASSISTANT

## 2019-02-28 PROCEDURE — 93018 CV STRESS TEST I&R ONLY: CPT | Performed by: INTERNAL MEDICINE

## 2019-02-28 PROCEDURE — 93017 CV STRESS TEST TRACING ONLY: CPT

## 2019-02-28 PROCEDURE — 78452 HT MUSCLE IMAGE SPECT MULT: CPT

## 2019-02-28 RX ADMIN — TECHNETIUM TC 99M SESTAMIBI 1 DOSE: 1 INJECTION INTRAVENOUS at 10:35

## 2019-02-28 RX ADMIN — REGADENOSON 0.4 MG: 0.08 INJECTION, SOLUTION INTRAVENOUS at 10:36

## 2019-02-28 RX ADMIN — TECHNETIUM TC 99M SESTAMIBI 1 DOSE: 1 INJECTION INTRAVENOUS at 08:20

## 2019-03-01 LAB
BH CV NUCLEAR PRIOR STUDY: 3
BH CV STRESS BP STAGE 1: NORMAL
BH CV STRESS BP STAGE 2: NORMAL
BH CV STRESS COMMENTS STAGE 1: NORMAL
BH CV STRESS COMMENTS STAGE 2: NORMAL
BH CV STRESS DOSE REGADENOSON STAGE 1: 0.4
BH CV STRESS DURATION MIN STAGE 1: 0
BH CV STRESS DURATION MIN STAGE 2: 4
BH CV STRESS DURATION SEC STAGE 1: 10
BH CV STRESS DURATION SEC STAGE 2: 0
BH CV STRESS GRADE STAGE 1: 10
BH CV STRESS HR STAGE 1: 86
BH CV STRESS HR STAGE 2: 70
BH CV STRESS METS STAGE 1: 5
BH CV STRESS PROTOCOL 1: NORMAL
BH CV STRESS RECOVERY BP: NORMAL MMHG
BH CV STRESS RECOVERY HR: 70 BPM
BH CV STRESS SPEED STAGE 1: 1.7
BH CV STRESS STAGE 1: 1
BH CV STRESS STAGE 2: 2
LV EF NUC BP: 64 %
MAXIMAL PREDICTED HEART RATE: 143 BPM
PERCENT MAX PREDICTED HR: 60.14 %
STRESS BASELINE BP: NORMAL MMHG
STRESS BASELINE HR: 62 BPM
STRESS PERCENT HR: 71 %
STRESS POST PEAK BP: NORMAL MMHG
STRESS POST PEAK HR: 86 BPM
STRESS TARGET HR: 122 BPM

## 2019-03-08 ENCOUNTER — TELEPHONE (OUTPATIENT)
Dept: CARDIOLOGY | Facility: CLINIC | Age: 79
End: 2019-03-08

## 2019-03-08 NOTE — TELEPHONE ENCOUNTER
Notes recorded by Lc Mackey PA-C on 3/4/2019 at 9:21 AM EST  No signs of blockages seen on this exam    Called patient advised her that there where no signs of blockages. She expressed verbal understanding.

## 2019-04-02 ENCOUNTER — OFFICE VISIT (OUTPATIENT)
Dept: CARDIOLOGY | Facility: CLINIC | Age: 79
End: 2019-04-02

## 2019-04-02 VITALS
SYSTOLIC BLOOD PRESSURE: 129 MMHG | BODY MASS INDEX: 27.16 KG/M2 | HEART RATE: 71 BPM | RESPIRATION RATE: 16 BRPM | DIASTOLIC BLOOD PRESSURE: 81 MMHG | HEIGHT: 66 IN | WEIGHT: 169 LBS

## 2019-04-02 DIAGNOSIS — R60.0 PEDAL EDEMA: ICD-10-CM

## 2019-04-02 DIAGNOSIS — I44.1 SECOND DEGREE AV BLOCK, MOBITZ TYPE II: Primary | ICD-10-CM

## 2019-04-02 PROCEDURE — 99213 OFFICE O/P EST LOW 20 MIN: CPT | Performed by: PHYSICIAN ASSISTANT

## 2019-04-02 RX ORDER — CETIRIZINE HYDROCHLORIDE 5 MG/1
5 TABLET, CHEWABLE ORAL DAILY
Qty: 30 TABLET | Refills: 0 | Status: SHIPPED | OUTPATIENT
Start: 2019-04-02 | End: 2019-04-03 | Stop reason: DRUGHIGH

## 2019-04-02 NOTE — PROGRESS NOTES
Shilpa Perrin APRN  Tessa Fisher  1940 04/02/2019    Patient Active Problem List   Diagnosis   • Gastroesophageal reflux disease without esophagitis   • Right leg pain   • Generalized weakness   • Near syncope   • Screening   • Syncope   • Symptomatic bradycardia   • Iatrogenic pneumothorax   • Former smoker   • History of pneumothorax   • Pacemaker   • Second degree AV block, Mobitz type II, status post permanent pacemaker implantation with a [employer] device on 4/3/18.   • Precordial pain   • Pedal edema       Dear Shilpa Perrin APRN:    Subjective     History of Present Illness:    Chief Complaint   Patient presents with   • Follow-up     stress findings   • leg cramps     burning sensation, onset 1 month   • Palpitations     races   • Med Management     verbal       Tessa Fisher is a pleasant 78 y.o. female with a past medical history significant for second-degree type II AV block status post permanent dual-chamber pacemaker [employer] April 2018.  She comes in to discuss recent stress test.    Patient stress test showed normal myocardial function with no signs of ischemia.  She states that she has been doing relatively well with her only complaint being pedal edema.  Chest pain, shortness of breath, palpitations, dizziness. she does state that she perform activities of daily living without any agitations.  States just yesterday she was able to push mowing her yard has a large hill in it and she does not get any chest pain while doing this.  She does report that she try taking the Lasix for 3-4 days but it made her dizzy and weak so she stopped it she denies any relief in the pedal edema while taking this.      Stress test on 3/1/2019  Interpretation Summary   · Myocardial perfusion imaging indicates a normal myocardial perfusion study with no evidence of ischemia.  · Left ventricular ejection fraction is normal (Calculated EF = 64%).  · Normal LV cavity size.  Normal LV wall motion noted.  · Impressions are consistent with a low risk study.            Venous ultrasound of lower extremities on 2019  Interpretation Summary   DUPLEX VENOUS LOWER EXTREMITY RIGHT CAR-     REASON FOR EXAM:  edema/swelling     Multiple real-time images were obtained. The deep veins were well  demonstrated sonographically. There is good color doppler signal seen  filling the deep veins. They were completely compressed by the  ultrasound transducer. There was good spontaneous venous flow and  augmentation. There are no echoes seen along the deep veins to suggest  clot.        IMPRESSION:  No sonographic findings of DVT in the right lower extremity     This report was finalized on 2018 3:20 PM by Dr. Ernesto Jack II, MD.           No Known Allergies:      Current Outpatient Medications:   •  cetirizine (ZyrTEC) 5 MG chewable tablet, Chew 1 tablet Daily., Disp: 30 tablet, Rfl: 0    The following portions of the patient's history were reviewed and updated as appropriate: allergies, current medications, past family history, past medical history, past social history, past surgical history and problem list.    Social History     Tobacco Use   • Smoking status: Former Smoker     Packs/day: 3.00     Years: 25.00     Pack years: 75.00     Types: Cigarettes     Last attempt to quit: 1975     Years since quittin.2   • Smokeless tobacco: Never Used   Substance Use Topics   • Alcohol use: No   • Drug use: No       Review of Systems   Constitution: Negative for weakness and malaise/fatigue.   Cardiovascular: Negative for chest pain, dyspnea on exertion, irregular heartbeat, leg swelling and palpitations.   Respiratory: Negative for cough and shortness of breath.    Hematologic/Lymphatic: Negative for bleeding problem. Does not bruise/bleed easily.   Gastrointestinal: Negative for nausea and vomiting.       Objective   Vitals:    19 1410   BP: 129/81   Pulse: 71   Resp: 16   Weight: 76.7 kg  "(169 lb)   Height: 167.6 cm (66\")     Body mass index is 27.28 kg/m².    Physical Exam   Constitutional: She is oriented to person, place, and time. She appears well-developed and well-nourished. No distress.   HENT:   Head: Normocephalic and atraumatic.   Cardiovascular: Normal rate, regular rhythm, normal heart sounds and intact distal pulses.   Pulmonary/Chest: Effort normal and breath sounds normal. No respiratory distress.   Musculoskeletal: She exhibits edema (2+ pedal edema bilaterally. ).   Neurological: She is alert and oriented to person, place, and time.   Skin: She is not diaphoretic.       Lab Results   Component Value Date     (L) 01/16/2019    K 4.5 01/16/2019     01/16/2019    CO2 28.8 01/16/2019    BUN 13 01/16/2019    CREATININE 0.80 01/16/2019    GLUCOSE 87 01/16/2019    CALCIUM 9.5 01/16/2019    AST 31 (H) 04/01/2018    ALT 45 (H) 04/01/2018    ALKPHOS 82 04/01/2018    LABIL2 1.5 05/29/2015     Lab Results   Component Value Date    CKTOTAL 49 04/02/2018     Lab Results   Component Value Date    WBC 6.81 04/03/2018    HGB 14.1 04/03/2018    HCT 43.4 04/03/2018     04/03/2018     Lab Results   Component Value Date    INR 1.06 04/04/2018    INR 0.94 04/16/2017     Lab Results   Component Value Date    MG 2.2 04/01/2018     Lab Results   Component Value Date    TSH 2.641 04/18/2017    TRIG 87 04/17/2017    HDL 53 (L) 04/17/2017    LDL 83 04/17/2017      Lab Results   Component Value Date    BNP 45.0 04/01/2018       During this visit the following were done:  Labs Reviewed [x]    Labs Ordered []    Radiology Reports Reviewed [x]    Radiology Ordered []    PCP Records Reviewed []    Referring Provider Records Reviewed []    ER Records Reviewed []    Hospital Records Reviewed []    History Obtained From Family []    Radiology Images Reviewed []    Other Reviewed []    Records Requested []       Procedures    Assessment/Plan    Diagnosis Plan   1. Second degree AV block, Mobitz type " II, status post permanent pacemaker implantation with a [employer] device on 4/3/18.     2. Pedal edema              Recommendations:  1. I discussed results of stress test with patient.  2. In regards to her pedal edema she has had a venous ultrasound was unremarkable and she does not have significant heart failure with only grade 1 diastolic dysfunction noted on echocardiogram performed on 4/17/2017 that was not present in April 2018.  I did advise her to try to wear compression stockings and prop her feet up as often as she can.  3. Patient also reported that she is having some sinus congestion and asked me to write her some Zyrtec.  I did go ahead and send in a 4-week supply and told her that she would like refill she had to ask her primary care provider.    Return in about 3 months (around 7/2/2019).    As always, I appreciate very much the opportunity to participate in the cardiovascular care of your patients.      With Best Regards,    Lc Mackey PA-C

## 2019-04-03 ENCOUNTER — TELEPHONE (OUTPATIENT)
Dept: CARDIOLOGY | Facility: CLINIC | Age: 79
End: 2019-04-03

## 2019-04-03 RX ORDER — CETIRIZINE HYDROCHLORIDE 10 MG/1
10 TABLET ORAL DAILY
Qty: 30 TABLET | Refills: 0 | Status: SHIPPED | OUTPATIENT
Start: 2019-04-03 | End: 2020-01-02

## 2019-04-12 ENCOUNTER — TREATMENT (OUTPATIENT)
Dept: CARDIOLOGY | Facility: CLINIC | Age: 79
End: 2019-04-12

## 2019-04-12 DIAGNOSIS — I49.5 SSS (SICK SINUS SYNDROME) (HCC): Primary | ICD-10-CM

## 2019-04-12 PROCEDURE — 93293 PM PHONE R-STRIP DEVICE EVAL: CPT | Performed by: INTERNAL MEDICINE

## 2019-07-02 ENCOUNTER — OFFICE VISIT (OUTPATIENT)
Dept: CARDIOLOGY | Facility: CLINIC | Age: 79
End: 2019-07-02

## 2019-07-02 VITALS
SYSTOLIC BLOOD PRESSURE: 128 MMHG | DIASTOLIC BLOOD PRESSURE: 77 MMHG | WEIGHT: 169.4 LBS | HEART RATE: 65 BPM | BODY MASS INDEX: 27.23 KG/M2 | HEIGHT: 66 IN | OXYGEN SATURATION: 99 %

## 2019-07-02 DIAGNOSIS — R60.0 BILATERAL LEG EDEMA: ICD-10-CM

## 2019-07-02 DIAGNOSIS — I44.1 SECOND DEGREE AV BLOCK, MOBITZ TYPE II: Primary | ICD-10-CM

## 2019-07-02 PROBLEM — J95.811 IATROGENIC PNEUMOTHORAX: Status: RESOLVED | Noted: 2018-04-03 | Resolved: 2019-07-02

## 2019-07-02 PROCEDURE — 93000 ELECTROCARDIOGRAM COMPLETE: CPT | Performed by: INTERNAL MEDICINE

## 2019-07-02 PROCEDURE — 99213 OFFICE O/P EST LOW 20 MIN: CPT | Performed by: INTERNAL MEDICINE

## 2019-07-02 RX ORDER — FUROSEMIDE 20 MG/1
20 TABLET ORAL DAILY
Qty: 30 TABLET | Refills: 5 | Status: SHIPPED | OUTPATIENT
Start: 2019-07-02 | End: 2020-01-02

## 2019-07-02 NOTE — PROGRESS NOTES
Shilpa Perrin APRN  Tessa Fisher  1940 07/02/2019    Patient Active Problem List   Diagnosis   • Gastroesophageal reflux disease without esophagitis   • Right leg pain   • Generalized weakness   • Near syncope   • Screening   • Syncope   • Symptomatic bradycardia   • Former smoker   • History of pneumothorax   • Pacemaker   • Second degree AV block, Mobitz type II, status post permanent pacemaker implantation with a [employer] device on 4/3/18.   • Precordial pain   • Bilateral leg edema       Dear Shilpa Perrin APRN:    Subjective     Tessa Fisher is a 78 y.o. female with the problems as listed above, presents    Chief complaint: Follow-up of permanent pacemaker implantation.    History of Present Illness: Ms. Fisher is a pleasant 78-year-old  female with history of high-grade second-degree AV block with symptomatic bradycardia, status post permanent pacemaker implantation April 2018.  She is here for regular cardiology follow-up.  She denies any complaints of significant palpitations, dizziness or syncope.  She has been having bilateral leg edema for which she underwent venous duplex scan of the lower extremities recently which revealed no DVT.  She denies any significant dyspnea, PND, orthopnea.  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    No Known Allergies:      Current Outpatient Medications:   •  cetirizine (zyrTEC) 10 MG tablet, Take 1 tablet by mouth Daily., Disp: 30 tablet, Rfl: 0  •  furosemide (LASIX) 20 MG tablet, Take 1 tablet by mouth Daily., Disp: 30 tablet, Rfl: 5      The following portions of the patient's history were reviewed and updated as appropriate: allergies, current medications, past family history, past medical history, past social history, past surgical history and problem list.    Social History     Tobacco Use   • Smoking status: Former Smoker     Packs/day: 3.00     Years: 25.00     Pack years: 75.00      "Types: Cigarettes     Last attempt to quit: 1975     Years since quittin.5   • Smokeless tobacco: Never Used   Substance Use Topics   • Alcohol use: No   • Drug use: No       Review of Systems   Constitution: Negative for chills and fever.   Cardiovascular: Positive for leg swelling (not new ) and palpitations (sometimes beats \"fast\"). Negative for chest pain.   Respiratory: Negative for shortness of breath.    Neurological: Negative for dizziness.       Objective   Vitals:    19 1533   BP: 128/77   BP Location: Left arm   Patient Position: Sitting   Cuff Size: Adult   Pulse: 65   SpO2: 99%   Weight: 76.8 kg (169 lb 6.4 oz)   Height: 167.6 cm (66\")     Body mass index is 27.34 kg/m².        Physical Exam   Constitutional: She is oriented to person, place, and time. She appears well-developed and well-nourished.   HENT:   Mouth/Throat: Oropharynx is clear and moist.   Eyes: EOM are normal. Pupils are equal, round, and reactive to light.   Neck: Neck supple. No JVD present. No tracheal deviation present. No thyromegaly present.   Cardiovascular: Normal rate, regular rhythm, S1 normal and S2 normal. Exam reveals no gallop and no friction rub.   No murmur heard.  Pulmonary/Chest: Effort normal and breath sounds normal.   Abdominal: Soft. Bowel sounds are normal. She exhibits no mass. There is no tenderness.   Musculoskeletal: Normal range of motion. She exhibits edema (2+ edema on both legs.).   Lymphadenopathy:     She has no cervical adenopathy.   Neurological: She is alert and oriented to person, place, and time.   Skin: Skin is warm and dry. No rash noted.   Psychiatric: She has a normal mood and affect.       Lab Results   Component Value Date     (L) 2019    K 4.5 2019     2019    CO2 28.8 2019    BUN 13 2019    CREATININE 0.80 2019    GLUCOSE 87 2019    CALCIUM 9.5 2019    AST 31 (H) 2018    ALT 45 (H) 2018    ALKPHOS 82 2018 "    LABIL2 1.5 05/29/2015     Lab Results   Component Value Date    CKTOTAL 49 04/02/2018     Lab Results   Component Value Date    WBC 6.81 04/03/2018    HGB 14.1 04/03/2018    HCT 43.4 04/03/2018     04/03/2018     Lab Results   Component Value Date    INR 1.06 04/04/2018    INR 0.94 04/16/2017     Lab Results   Component Value Date    MG 2.2 04/01/2018     Lab Results   Component Value Date    TSH 2.641 04/18/2017    TRIG 87 04/17/2017    HDL 53 (L) 04/17/2017    LDL 83 04/17/2017      Lab Results   Component Value Date    BNP 45.0 04/01/2018         ECG 12 Lead  Date/Time: 7/2/2019 4:48 PM  Performed by: Refugio Gautam MD  Authorized by: Refugio Gautam MD   Comparison: compared with previous ECG from 1/9/2019  Similar to previous ECG  Rhythm: sinus rhythm  Conduction: left bundle branch block  Comments: Appropriate AV sequential pacing.              Assessment/Plan :   Diagnosis Plan   1. Second degree AV block, Mobitz type II, status post permanent pacemaker implantation with a [employer] device on 4/3/18,clinically stable..     2. Bilateral leg edema          Recommendations:  1. Try lasix 20 daily prn leg edema.  2. Try to keep legs propped up as much as possible.    Return in about 3 months (around 10/2/2019).    As always, Shilpa  I appreciate very much the opportunity to participate in the cardiovascular care of your patients. Please do not hesitate to call me with any questions with regards to Tessa Fisher's evaluation and management.       With Best Regards,        Refugio Gautam MD, [facility]    Dragon disclaimer:  Much of this encounter note is an electronic transcription/translation of spoken language to printed text. The electronic translation of spoken language may permit erroneous, or at times, nonsensical words or phrases to be inadvertently transcribed; Although I have reviewed the note for such errors, some may still exist.

## 2019-07-10 ENCOUNTER — OFFICE VISIT (OUTPATIENT)
Dept: FAMILY MEDICINE CLINIC | Facility: CLINIC | Age: 79
End: 2019-07-10

## 2019-07-10 VITALS
DIASTOLIC BLOOD PRESSURE: 80 MMHG | WEIGHT: 171 LBS | HEIGHT: 66 IN | HEART RATE: 80 BPM | BODY MASS INDEX: 27.48 KG/M2 | SYSTOLIC BLOOD PRESSURE: 140 MMHG | TEMPERATURE: 98.3 F | OXYGEN SATURATION: 98 %

## 2019-07-10 DIAGNOSIS — M25.561 CHRONIC PAIN OF RIGHT KNEE: Primary | ICD-10-CM

## 2019-07-10 DIAGNOSIS — G89.29 CHRONIC PAIN OF RIGHT KNEE: Primary | ICD-10-CM

## 2019-07-10 DIAGNOSIS — R60.0 BILATERAL LEG EDEMA: ICD-10-CM

## 2019-07-10 PROCEDURE — 99213 OFFICE O/P EST LOW 20 MIN: CPT | Performed by: NURSE PRACTITIONER

## 2019-07-10 NOTE — PROGRESS NOTES
Subjective   Tessa Fisher is a 78 y.o. female.     Chief Complaint: Edema (bilateral legs, feet, ankles)    Knee Pain    The incident occurred more than 1 week ago. The incident occurred at home. The injury mechanism was a twisting injury. The pain is present in the right knee. The quality of the pain is described as aching. The pain is moderate. The pain has been fluctuating since onset. Associated symptoms comments: Pain is worse with sitting or lying down. She reports no foreign bodies present. Nothing aggravates the symptoms. She has tried ice, rest and immobilization for the symptoms. The treatment provided no relief.   Leg Swelling   This is a chronic problem. The current episode started more than 1 year ago. The problem occurs daily. Associated symptoms include arthralgias. Associated symptoms comments: Swelling resolves with lying down; does not wish to take any medication. The symptoms are aggravated by standing and walking. She has tried lying down and rest for the symptoms. The treatment provided significant relief.        Family History   Problem Relation Age of Onset   • Cancer Mother         Basal cell cancer of the skin    • Cancer Father    • Cancer Brother         Basal cell cancer of the skin   • Diabetes Neg Hx    • Heart disease Neg Hx    • Hypertension Neg Hx        Social History     Socioeconomic History   • Marital status:      Spouse name: Not on file   • Number of children: Not on file   • Years of education: Not on file   • Highest education level: Not on file   Tobacco Use   • Smoking status: Former Smoker     Packs/day: 3.00     Years: 25.00     Pack years: 75.00     Types: Cigarettes     Last attempt to quit: 1975     Years since quittin.5   • Smokeless tobacco: Never Used   Substance and Sexual Activity   • Alcohol use: No   • Drug use: No   • Sexual activity: Defer       Past Medical History:   Diagnosis Date   • Basal cell carcinoma 2016    Left groin   •  "Diastolic congestive heart failure (CMS/HCC)    • GERD (gastroesophageal reflux disease)    • Hiatal hernia    • LBBB (left bundle branch block)    • Pacemaker    • Popliteal cyst, right        Review of Systems   Constitutional: Negative.    HENT: Negative.    Respiratory: Negative.    Cardiovascular: Negative.    Gastrointestinal: Negative.    Musculoskeletal: Positive for arthralgias.   Skin: Negative.    Neurological: Negative.    Psychiatric/Behavioral: Negative.        Objective   Physical Exam   Constitutional: She is oriented to person, place, and time. She appears well-developed and well-nourished.   Neck: Normal range of motion. Neck supple.   Cardiovascular: Normal rate, regular rhythm and normal heart sounds.   Pulmonary/Chest: Effort normal and breath sounds normal.   Musculoskeletal: Normal range of motion. She exhibits edema and tenderness.   Neurological: She is alert and oriented to person, place, and time.   Skin: Skin is warm and dry.   Psychiatric: She has a normal mood and affect. Her behavior is normal. Judgment and thought content normal.   Nursing note and vitals reviewed.      Procedures    Vitals: Blood pressure 140/80, pulse 80, temperature 98.3 °F (36.8 °C), temperature source Oral, height 167.6 cm (66\"), weight 77.6 kg (171 lb), SpO2 98 %, not currently breastfeeding.    Allergies: No Known Allergies     During this visit the following were done:  Labs Reviewed []    Labs Ordered []    Radiology Reports Reviewed []    Radiology Ordered []    PCP Records Reviewed []    Referring Provider Records Reviewed []    ER Records Reviewed []    Hospital Records Reviewed []    History Obtained From Family []    Radiology Images Reviewed []    Other Reviewed []    Records Requested []      Assessment/Plan   Tessa was seen today for edema.    Diagnoses and all orders for this visit:    Chronic pain of right knee  -     Ambulatory Referral to Physical Therapy Evaluate and treat    Bilateral leg " edema      Keep legs elevated as much as possible

## 2019-11-15 ENCOUNTER — TREATMENT (OUTPATIENT)
Dept: CARDIOLOGY | Facility: CLINIC | Age: 79
End: 2019-11-15

## 2019-11-15 DIAGNOSIS — I49.5 SSS (SICK SINUS SYNDROME) (HCC): Primary | ICD-10-CM

## 2019-11-15 PROCEDURE — 93293 PM PHONE R-STRIP DEVICE EVAL: CPT | Performed by: INTERNAL MEDICINE

## 2019-12-09 ENCOUNTER — TELEPHONE (OUTPATIENT)
Dept: CARDIOLOGY | Facility: CLINIC | Age: 79
End: 2019-12-09

## 2019-12-09 NOTE — TELEPHONE ENCOUNTER
Pt was wondering if Tracy may have tried to reach her about a pacer phone check. Tracy is currently in clinic, have sent her a message asking her to contact pt when she is able.

## 2019-12-18 ENCOUNTER — CLINICAL SUPPORT (OUTPATIENT)
Dept: CARDIOLOGY | Facility: CLINIC | Age: 79
End: 2019-12-18

## 2019-12-18 DIAGNOSIS — I49.5 SSS (SICK SINUS SYNDROME) (HCC): Primary | ICD-10-CM

## 2019-12-18 PROCEDURE — 93288 INTERROG EVL PM/LDLS PM IP: CPT | Performed by: INTERNAL MEDICINE

## 2020-01-02 ENCOUNTER — OFFICE VISIT (OUTPATIENT)
Dept: CARDIOLOGY | Facility: CLINIC | Age: 80
End: 2020-01-02

## 2020-01-02 VITALS
HEIGHT: 66 IN | SYSTOLIC BLOOD PRESSURE: 127 MMHG | BODY MASS INDEX: 27.42 KG/M2 | RESPIRATION RATE: 16 BRPM | WEIGHT: 170.6 LBS | DIASTOLIC BLOOD PRESSURE: 80 MMHG | HEART RATE: 71 BPM

## 2020-01-02 DIAGNOSIS — R60.0 BILATERAL LEG EDEMA: ICD-10-CM

## 2020-01-02 DIAGNOSIS — I44.1 SECOND DEGREE AV BLOCK, MOBITZ TYPE II: Primary | ICD-10-CM

## 2020-01-02 PROCEDURE — 99213 OFFICE O/P EST LOW 20 MIN: CPT | Performed by: INTERNAL MEDICINE

## 2020-01-02 PROCEDURE — 93000 ELECTROCARDIOGRAM COMPLETE: CPT | Performed by: INTERNAL MEDICINE

## 2020-01-02 NOTE — PROGRESS NOTES
Shilpa Perrin APRN  Tessa Fisher  1940 01/02/2020    Patient Active Problem List   Diagnosis   • Gastroesophageal reflux disease without esophagitis   • Right leg pain   • Generalized weakness   • Near syncope   • Screening   • Syncope   • Symptomatic bradycardia   • Former smoker   • History of pneumothorax   • Pacemaker   • Second degree AV block, Mobitz type II, status post permanent pacemaker implantation with a [employer] device on 4/3/18.   • Precordial pain   • Bilateral leg edema       Dear Shilpa Perrin APRN:    Subjective     Tessa Fisher is a 79 y.o. female with the problems as listed above, presents    Chief Complaint: Follow-up of high-grade second-degree AV block and leg edema.     History of Present Illness: Ms. Fisher is a pleasant 79-year-old  female with history of high-grade second-degree AV block for which she has had permanent dual-chamber pacemaker implanted in April 2018.  She is here for regular cardiology follow-up.  Her main complaint on today's visit is bilateral leg edema.  She has had this for a long time and she has had previous venous duplex scans that were negative for DVT.  She denies any dyspnea, PND or orthopnea.  She has no history of congestive heart failure.    No Known Allergies:      Current Outpatient Medications:   •  cetirizine (zyrTEC) 10 MG tablet, Take 1 tablet by mouth Daily., Disp: 30 tablet, Rfl: 0  •  furosemide (LASIX) 20 MG tablet, Take 1 tablet by mouth Daily. (Patient taking differently: Take 20 mg by mouth Daily As Needed.), Disp: 30 tablet, Rfl: 5      The following portions of the patient's history were reviewed and updated as appropriate: allergies, current medications, past family history, past medical history, past social history, past surgical history and problem list.    Social History     Tobacco Use   • Smoking status: Former Smoker     Packs/day: 3.00     Years: 25.00     Pack years: 75.00      "Types: Cigarettes     Last attempt to quit: 1975     Years since quittin.0   • Smokeless tobacco: Never Used   Substance Use Topics   • Alcohol use: No   • Drug use: No       ROS    Objective   Vitals:    20 1456   BP: 127/80   Pulse: 71   Resp: 16   Weight: 77.4 kg (170 lb 9.6 oz)   Height: 167.6 cm (66\")     Body mass index is 27.54 kg/m².        Physical Exam   Constitutional: She is oriented to person, place, and time. She appears well-developed and well-nourished.   HENT:   Mouth/Throat: Oropharynx is clear and moist.   Eyes: Pupils are equal, round, and reactive to light. EOM are normal.   Neck: Neck supple. No JVD present. No tracheal deviation present. No thyromegaly present.   Cardiovascular: Normal rate, regular rhythm, S1 normal and S2 normal. Exam reveals no gallop and no friction rub.   No murmur heard.  Pulmonary/Chest: Effort normal and breath sounds normal.   Abdominal: Soft. Bowel sounds are normal. She exhibits no mass. There is no tenderness.   Musculoskeletal: Normal range of motion. She exhibits no edema.   Lymphadenopathy:     She has no cervical adenopathy.   Neurological: She is alert and oriented to person, place, and time.   Skin: Skin is warm and dry. No rash noted.   Psychiatric: She has a normal mood and affect.       ECG 12 Lead  Date/Time: 2020 3:44 PM  Performed by: Refugio Gautam MD  Authorized by: Refugio Gautam MD   Rhythm: sinus rhythm  Conduction: left bundle branch block  Comments: Appropriate AV sequential pacing noted on magnet applied EKG.                Lab Results   Component Value Date     (L) 2019    K 4.5 2019     2019    CO2 28.8 2019    BUN 13 2019    CREATININE 0.80 2019    GLUCOSE 87 2019    CALCIUM 9.5 2019    AST 31 (H) 2018    ALT 45 (H) 2018    ALKPHOS 82 2018    LABIL2 1.5 2015     Lab Results   Component Value Date    CKTOTAL 49 2018     Lab Results   "   Component Value Date    WBC 6.81 04/03/2018    HGB 14.1 04/03/2018    HCT 43.4 04/03/2018     04/03/2018     Lab Results   Component Value Date    INR 1.06 04/04/2018    INR 0.94 04/16/2017     Lab Results   Component Value Date    MG 2.2 04/01/2018     Lab Results   Component Value Date    TSH 2.641 04/18/2017    TRIG 87 04/17/2017    HDL 53 (L) 04/17/2017    LDL 83 04/17/2017      Lab Results   Component Value Date    BNP 45.0 04/01/2018       Assessment/Plan    Diagnosis Plan   1. Second degree AV block, Mobitz type II, status post permanent pacemaker implantation with a [employer] device on 4/3/18.     2. Bilateral leg edema  Ambulatory Referral to Cardiology    Adult Transthoracic Echo Complete W/ Cont if Necessary Per Protocol        Recommendations:  1. Since she has a persistent bilateral leg edema, I have discussed with her about referring her to interventional cardiologist to see if she has any venous obstructive disease.  Patient is agreeable.  2. Obtain echo Doppler study to evaluate her LV systolic and diastolic function.    Return in about 3 months (around 4/2/2020).    As always,Shilpa  I appreciate very much the opportunity to participate in the cardiovascular care of your patients. Please do not hesitate to call me with any questions with regards to Tessa Fisher's evaluation and management.       With Best Regards,        Refugio Gautam MD, [facility]    Please note that portions of this note were completed with a voice recognition program.

## 2020-01-16 ENCOUNTER — HOSPITAL ENCOUNTER (OUTPATIENT)
Dept: CARDIOLOGY | Facility: HOSPITAL | Age: 80
Discharge: HOME | End: 2020-01-16
Admitting: INTERNAL MEDICINE

## 2020-01-16 DIAGNOSIS — R60.0 BILATERAL LEG EDEMA: ICD-10-CM

## 2020-01-16 LAB
BH CV ECHO MEAS - % IVS THICK: 36.7 %
BH CV ECHO MEAS - % LVPW THICK: 85.7 %
BH CV ECHO MEAS - ACS: 2.1 CM
BH CV ECHO MEAS - AO ROOT AREA (BSA CORRECTED): 1.5
BH CV ECHO MEAS - AO ROOT AREA: 6 CM^2
BH CV ECHO MEAS - AO ROOT DIAM: 2.8 CM
BH CV ECHO MEAS - BSA(HAYCOCK): 1.9 M^2
BH CV ECHO MEAS - BSA: 1.9 M^2
BH CV ECHO MEAS - BZI_BMI: 27.4 KILOGRAMS/M^2
BH CV ECHO MEAS - BZI_METRIC_HEIGHT: 167.6 CM
BH CV ECHO MEAS - BZI_METRIC_WEIGHT: 77.1 KG
BH CV ECHO MEAS - EDV(CUBED): 108.5 ML
BH CV ECHO MEAS - EDV(MOD-SP4): 49 ML
BH CV ECHO MEAS - EDV(TEICH): 105.9 ML
BH CV ECHO MEAS - EF(CUBED): 55.8 %
BH CV ECHO MEAS - EF(MOD-SP4): 69.4 %
BH CV ECHO MEAS - EF(TEICH): 47.5 %
BH CV ECHO MEAS - ESV(CUBED): 47.9 ML
BH CV ECHO MEAS - ESV(MOD-SP4): 15 ML
BH CV ECHO MEAS - ESV(TEICH): 55.6 ML
BH CV ECHO MEAS - FS: 23.9 %
BH CV ECHO MEAS - IVS/LVPW: 1.2
BH CV ECHO MEAS - IVSD: 1 CM
BH CV ECHO MEAS - IVSS: 1.4 CM
BH CV ECHO MEAS - LA DIMENSION: 3.9 CM
BH CV ECHO MEAS - LA/AO: 1.4
BH CV ECHO MEAS - LV DIASTOLIC VOL/BSA (35-75): 26.3 ML/M^2
BH CV ECHO MEAS - LV MASS(C)D: 153.6 GRAMS
BH CV ECHO MEAS - LV MASS(C)DI: 82.3 GRAMS/M^2
BH CV ECHO MEAS - LV MASS(C)S: 200.1 GRAMS
BH CV ECHO MEAS - LV MASS(C)SI: 107.2 GRAMS/M^2
BH CV ECHO MEAS - LV SYSTOLIC VOL/BSA (12-30): 8 ML/M^2
BH CV ECHO MEAS - LVIDD: 4.8 CM
BH CV ECHO MEAS - LVIDS: 3.6 CM
BH CV ECHO MEAS - LVLD AP4: 5.7 CM
BH CV ECHO MEAS - LVLS AP4: 4.9 CM
BH CV ECHO MEAS - LVOT AREA (M): 2.8 CM^2
BH CV ECHO MEAS - LVOT AREA: 2.8 CM^2
BH CV ECHO MEAS - LVOT DIAM: 1.9 CM
BH CV ECHO MEAS - LVPWD: 0.85 CM
BH CV ECHO MEAS - LVPWS: 1.6 CM
BH CV ECHO MEAS - MV A MAX VEL: 85.4 CM/SEC
BH CV ECHO MEAS - MV E MAX VEL: 61.7 CM/SEC
BH CV ECHO MEAS - MV E/A: 0.72
BH CV ECHO MEAS - RVDD: 2.2 CM
BH CV ECHO MEAS - SI(CUBED): 32.4 ML/M^2
BH CV ECHO MEAS - SI(MOD-SP4): 18.2 ML/M^2
BH CV ECHO MEAS - SI(TEICH): 27 ML/M^2
BH CV ECHO MEAS - SV(CUBED): 60.6 ML
BH CV ECHO MEAS - SV(MOD-SP4): 34 ML
BH CV ECHO MEAS - SV(TEICH): 50.3 ML
MAXIMAL PREDICTED HEART RATE: 142 BPM
STRESS TARGET HR: 121 BPM

## 2020-01-16 PROCEDURE — 93306 TTE W/DOPPLER COMPLETE: CPT | Performed by: INTERNAL MEDICINE

## 2020-01-16 PROCEDURE — 93306 TTE W/DOPPLER COMPLETE: CPT

## 2020-01-22 ENCOUNTER — TELEPHONE (OUTPATIENT)
Dept: CARDIOLOGY | Facility: CLINIC | Age: 80
End: 2020-01-22

## 2020-01-22 NOTE — TELEPHONE ENCOUNTER
Called pt home # no answer no VM.   Called Kamila's cell no answer LM.       ----- Message from HEENA Lopez sent at 1/17/2020  2:31 PM EST -----  Echo shows normal EF, grade I diastolic dysfunction, no significant valvular abnormality

## 2020-02-02 ENCOUNTER — HOSPITAL ENCOUNTER (EMERGENCY)
Facility: HOSPITAL | Age: 80
Discharge: HOME | End: 2020-02-03
Attending: EMERGENCY MEDICINE | Admitting: EMERGENCY MEDICINE

## 2020-02-02 ENCOUNTER — APPOINTMENT (OUTPATIENT)
Dept: CT IMAGING | Facility: HOSPITAL | Age: 80
End: 2020-02-02

## 2020-02-02 ENCOUNTER — APPOINTMENT (OUTPATIENT)
Dept: GENERAL RADIOLOGY | Facility: HOSPITAL | Age: 80
End: 2020-02-02

## 2020-02-02 DIAGNOSIS — K20.90 ESOPHAGITIS: Primary | ICD-10-CM

## 2020-02-02 LAB
ALBUMIN SERPL-MCNC: 3.87 G/DL (ref 3.5–5.2)
ALBUMIN/GLOB SERPL: 1.2 G/DL
ALP SERPL-CCNC: 88 U/L (ref 39–117)
ALT SERPL W P-5'-P-CCNC: 14 U/L (ref 1–33)
AMYLASE SERPL-CCNC: 45 U/L (ref 28–100)
ANION GAP SERPL CALCULATED.3IONS-SCNC: 13.6 MMOL/L (ref 5–15)
AST SERPL-CCNC: 17 U/L (ref 1–32)
BASOPHILS # BLD AUTO: 0.1 10*3/MM3 (ref 0–0.2)
BASOPHILS NFR BLD AUTO: 1.3 % (ref 0–1.5)
BILIRUB SERPL-MCNC: 0.4 MG/DL (ref 0.2–1.2)
BUN BLD-MCNC: 14 MG/DL (ref 8–23)
BUN/CREAT SERPL: 21.5 (ref 7–25)
CALCIUM SPEC-SCNC: 9.5 MG/DL (ref 8.6–10.5)
CHLORIDE SERPL-SCNC: 98 MMOL/L (ref 98–107)
CO2 SERPL-SCNC: 22.4 MMOL/L (ref 22–29)
CREAT BLD-MCNC: 0.65 MG/DL (ref 0.57–1)
DEPRECATED RDW RBC AUTO: 43.6 FL (ref 37–54)
EOSINOPHIL # BLD AUTO: 0.44 10*3/MM3 (ref 0–0.4)
EOSINOPHIL NFR BLD AUTO: 5.7 % (ref 0.3–6.2)
ERYTHROCYTE [DISTWIDTH] IN BLOOD BY AUTOMATED COUNT: 12.8 % (ref 12.3–15.4)
GFR SERPL CREATININE-BSD FRML MDRD: 88 ML/MIN/1.73
GLOBULIN UR ELPH-MCNC: 3.2 GM/DL
GLUCOSE BLD-MCNC: 94 MG/DL (ref 65–99)
HCT VFR BLD AUTO: 40.6 % (ref 34–46.6)
HGB BLD-MCNC: 13.1 G/DL (ref 12–15.9)
IMM GRANULOCYTES # BLD AUTO: 0.03 10*3/MM3 (ref 0–0.05)
IMM GRANULOCYTES NFR BLD AUTO: 0.4 % (ref 0–0.5)
LIPASE SERPL-CCNC: 38 U/L (ref 13–60)
LYMPHOCYTES # BLD AUTO: 2.35 10*3/MM3 (ref 0.7–3.1)
LYMPHOCYTES NFR BLD AUTO: 30.2 % (ref 19.6–45.3)
MCH RBC QN AUTO: 29.7 PG (ref 26.6–33)
MCHC RBC AUTO-ENTMCNC: 32.3 G/DL (ref 31.5–35.7)
MCV RBC AUTO: 92.1 FL (ref 79–97)
MONOCYTES # BLD AUTO: 0.64 10*3/MM3 (ref 0.1–0.9)
MONOCYTES NFR BLD AUTO: 8.2 % (ref 5–12)
NEUTROPHILS # BLD AUTO: 4.21 10*3/MM3 (ref 1.7–7)
NEUTROPHILS NFR BLD AUTO: 54.2 % (ref 42.7–76)
NRBC BLD AUTO-RTO: 0 /100 WBC (ref 0–0.2)
PLATELET # BLD AUTO: 251 10*3/MM3 (ref 140–450)
PMV BLD AUTO: 9.7 FL (ref 6–12)
POTASSIUM BLD-SCNC: 4 MMOL/L (ref 3.5–5.2)
PROT SERPL-MCNC: 7.1 G/DL (ref 6–8.5)
RBC # BLD AUTO: 4.41 10*6/MM3 (ref 3.77–5.28)
SODIUM BLD-SCNC: 134 MMOL/L (ref 136–145)
TROPONIN T SERPL-MCNC: <0.01 NG/ML (ref 0–0.03)
WBC NRBC COR # BLD: 7.77 10*3/MM3 (ref 3.4–10.8)

## 2020-02-02 PROCEDURE — 82150 ASSAY OF AMYLASE: CPT | Performed by: EMERGENCY MEDICINE

## 2020-02-02 PROCEDURE — 96372 THER/PROPH/DIAG INJ SC/IM: CPT

## 2020-02-02 PROCEDURE — 96374 THER/PROPH/DIAG INJ IV PUSH: CPT

## 2020-02-02 PROCEDURE — 71046 X-RAY EXAM CHEST 2 VIEWS: CPT

## 2020-02-02 PROCEDURE — 84484 ASSAY OF TROPONIN QUANT: CPT | Performed by: EMERGENCY MEDICINE

## 2020-02-02 PROCEDURE — 80053 COMPREHEN METABOLIC PANEL: CPT | Performed by: EMERGENCY MEDICINE

## 2020-02-02 PROCEDURE — 99284 EMERGENCY DEPT VISIT MOD MDM: CPT

## 2020-02-02 PROCEDURE — 83690 ASSAY OF LIPASE: CPT | Performed by: EMERGENCY MEDICINE

## 2020-02-02 PROCEDURE — 85025 COMPLETE CBC W/AUTO DIFF WBC: CPT | Performed by: EMERGENCY MEDICINE

## 2020-02-02 PROCEDURE — 93010 ELECTROCARDIOGRAM REPORT: CPT | Performed by: SPECIALIST

## 2020-02-02 PROCEDURE — 93005 ELECTROCARDIOGRAM TRACING: CPT | Performed by: EMERGENCY MEDICINE

## 2020-02-02 PROCEDURE — 25010000002 ONDANSETRON PER 1 MG: Performed by: EMERGENCY MEDICINE

## 2020-02-02 PROCEDURE — 25010000002 DICYCLOMINE PER 20 MG: Performed by: EMERGENCY MEDICINE

## 2020-02-02 PROCEDURE — 74176 CT ABD & PELVIS W/O CONTRAST: CPT

## 2020-02-02 RX ORDER — SODIUM CHLORIDE 0.9 % (FLUSH) 0.9 %
10 SYRINGE (ML) INJECTION AS NEEDED
Status: DISCONTINUED | OUTPATIENT
Start: 2020-02-02 | End: 2020-02-03 | Stop reason: HOSPADM

## 2020-02-02 RX ORDER — DICYCLOMINE HYDROCHLORIDE 10 MG/ML
20 INJECTION INTRAMUSCULAR ONCE
Status: COMPLETED | OUTPATIENT
Start: 2020-02-02 | End: 2020-02-02

## 2020-02-02 RX ORDER — ONDANSETRON 2 MG/ML
4 INJECTION INTRAMUSCULAR; INTRAVENOUS ONCE
Status: COMPLETED | OUTPATIENT
Start: 2020-02-02 | End: 2020-02-02

## 2020-02-02 RX ADMIN — DICYCLOMINE HYDROCHLORIDE 20 MG: 20 INJECTION, SOLUTION INTRAMUSCULAR at 23:57

## 2020-02-02 RX ADMIN — SODIUM CHLORIDE 1000 ML: 9 INJECTION, SOLUTION INTRAVENOUS at 23:55

## 2020-02-02 RX ADMIN — ONDANSETRON 4 MG: 2 INJECTION INTRAMUSCULAR; INTRAVENOUS at 23:56

## 2020-02-03 VITALS
OXYGEN SATURATION: 97 % | WEIGHT: 165 LBS | HEART RATE: 67 BPM | RESPIRATION RATE: 18 BRPM | HEIGHT: 66 IN | TEMPERATURE: 98.5 F | DIASTOLIC BLOOD PRESSURE: 70 MMHG | BODY MASS INDEX: 26.52 KG/M2 | SYSTOLIC BLOOD PRESSURE: 131 MMHG

## 2020-02-03 LAB
BACTERIA UR QL AUTO: ABNORMAL /HPF
BILIRUB UR QL STRIP: NEGATIVE
CLARITY UR: CLEAR
COLOR UR: YELLOW
GLUCOSE UR STRIP-MCNC: NEGATIVE MG/DL
HGB UR QL STRIP.AUTO: NEGATIVE
HOLD SPECIMEN: NORMAL
HOLD SPECIMEN: NORMAL
HYALINE CASTS UR QL AUTO: ABNORMAL /LPF
KETONES UR QL STRIP: NEGATIVE
LEUKOCYTE ESTERASE UR QL STRIP.AUTO: ABNORMAL
NITRITE UR QL STRIP: NEGATIVE
PH UR STRIP.AUTO: 7 [PH] (ref 5–8)
PROT UR QL STRIP: NEGATIVE
RBC # UR: ABNORMAL /HPF
REF LAB TEST METHOD: ABNORMAL
SP GR UR STRIP: 1.01 (ref 1–1.03)
SQUAMOUS #/AREA URNS HPF: ABNORMAL /HPF
UROBILINOGEN UR QL STRIP: ABNORMAL
WBC UR QL AUTO: ABNORMAL /HPF
WHOLE BLOOD HOLD SPECIMEN: NORMAL
WHOLE BLOOD HOLD SPECIMEN: NORMAL

## 2020-02-03 PROCEDURE — 81001 URINALYSIS AUTO W/SCOPE: CPT | Performed by: EMERGENCY MEDICINE

## 2020-02-03 RX ORDER — LIDOCAINE HYDROCHLORIDE 20 MG/ML
15 SOLUTION OROPHARYNGEAL ONCE
Status: COMPLETED | OUTPATIENT
Start: 2020-02-03 | End: 2020-02-03

## 2020-02-03 RX ORDER — PANTOPRAZOLE SODIUM 40 MG/1
40 TABLET, DELAYED RELEASE ORAL DAILY
Qty: 30 TABLET | Refills: 0 | Status: SHIPPED | OUTPATIENT
Start: 2020-02-03 | End: 2021-01-12 | Stop reason: SDUPTHER

## 2020-02-03 RX ORDER — PHENOBARBITAL, HYOSCYAMINE SULFATE, ATROPINE SULFATE AND SCOPOLAMINE HYDROBROMIDE .0194; .1037; 16.2; .0065 MG/1; MG/1; MG/1; MG/1
2 TABLET ORAL ONCE
Status: COMPLETED | OUTPATIENT
Start: 2020-02-03 | End: 2020-02-03

## 2020-02-03 RX ORDER — DICYCLOMINE HYDROCHLORIDE 10 MG/1
10 CAPSULE ORAL EVERY 8 HOURS PRN
Qty: 30 CAPSULE | Refills: 0 | Status: SHIPPED | OUTPATIENT
Start: 2020-02-03 | End: 2020-10-02

## 2020-02-03 RX ORDER — ALUMINA, MAGNESIA, AND SIMETHICONE 2400; 2400; 240 MG/30ML; MG/30ML; MG/30ML
15 SUSPENSION ORAL ONCE
Status: COMPLETED | OUTPATIENT
Start: 2020-02-03 | End: 2020-02-03

## 2020-02-03 RX ORDER — SUCRALFATE ORAL 1 G/10ML
1 SUSPENSION ORAL 4 TIMES DAILY
Qty: 280 ML | Refills: 0 | Status: SHIPPED | OUTPATIENT
Start: 2020-02-03 | End: 2020-02-10

## 2020-02-03 RX ADMIN — LIDOCAINE HYDROCHLORIDE 15 ML: 20 SOLUTION ORAL; TOPICAL at 02:10

## 2020-02-03 RX ADMIN — ALUMINUM HYDROXIDE, MAGNESIUM HYDROXIDE, AND DIMETHICONE 15 ML: 400; 400; 40 SUSPENSION ORAL at 02:10

## 2020-02-03 RX ADMIN — PHENOBARBITAL, HYOSCYAMINE SULFATE, ATROPINE SULFATE, SCOPOLAMINE HYDROBROMIDE 32.4 MG: 16.2; .1037; .0194; .0065 TABLET ORAL at 02:10

## 2020-02-03 NOTE — ED PROVIDER NOTES
Subjective   79-year-old female in the emergency department reporting abdominal pain that is been waxing and waning for the last several months associated with some nausea and occasional vomiting.  Patient in the emergency department for further evaluation and treatment.  Denies shortness of breath or chest pain.  Patient has significant past medical history of basal cell carcinoma, diastolic congestive heart failure, GERD, hiatal hernia, left bundle branch block, pacemaker, and popliteal cyst.      History provided by:  Patient   used: No    Abdominal Pain   Pain location:  Epigastric  Pain quality: aching, cramping and dull    Pain radiates to:  Does not radiate  Pain severity:  Mild  Onset quality:  Gradual  Timing:  Intermittent  Progression:  Worsening  Chronicity:  Chronic  Context: not alcohol use, not awakening from sleep, not diet changes, not eating, not laxative use, not medication withdrawal, not previous surgeries, not recent illness, not recent sexual activity, not recent travel, not retching, not sick contacts, not suspicious food intake and not trauma    Relieved by:  Nothing  Worsened by:  Nothing  Ineffective treatments:  None tried  Associated symptoms: nausea and vomiting    Associated symptoms: no anorexia, no belching, no chest pain, no chills, no constipation, no cough, no diarrhea, no dysuria, no fatigue, no fever, no flatus, no hematemesis, no hematochezia, no hematuria, no melena, no shortness of breath, no sore throat, no vaginal bleeding and no vaginal discharge    Nausea:     Severity:  Mild    Onset quality:  Gradual    Timing:  Constant    Progression:  Worsening  Risk factors: being elderly and obesity    Risk factors: no alcohol abuse, no aspirin use, has not had multiple surgeries, no NSAID use, not pregnant and no recent hospitalization        Review of Systems   Constitutional: Negative for chills, fatigue and fever.   HENT: Negative for sore throat.     Respiratory: Negative for cough and shortness of breath.    Cardiovascular: Negative for chest pain.   Gastrointestinal: Positive for abdominal pain, nausea and vomiting. Negative for anorexia, constipation, diarrhea, flatus, hematemesis, hematochezia and melena.   Genitourinary: Negative for dysuria, hematuria, vaginal bleeding and vaginal discharge.   All other systems reviewed and are negative.      Past Medical History:   Diagnosis Date   • Basal cell carcinoma 03/04/2016    Left groin   • Diastolic congestive heart failure (CMS/HCC)    • GERD (gastroesophageal reflux disease)    • Hiatal hernia    • LBBB (left bundle branch block)    • Pacemaker    • Popliteal cyst, right        No Known Allergies    Past Surgical History:   Procedure Laterality Date   • APPENDECTOMY  2015   • CARDIAC ELECTROPHYSIOLOGY PROCEDURE N/A 4/3/2018    Procedure: Pacemaker SC new;  Surgeon: Refugio Gautam MD;  Location: [facility] CATH INVASIVE LOCATION;  Service: Cardiovascular   • CATARACT EXTRACTION     • COLONOSCOPY  1990   • COLONOSCOPY N/A 7/6/2017    Procedure: COLONOSCOPY;  Surgeon: Arun Phelps MD;  Location: [facility] OR;  Service:    • ENDOSCOPY N/A 12/30/2016    Procedure: ESOPHAGOGASTRODUODENOSCOPY WITH ANESTHESIA;  Surgeon: Arun Phelps MD;  Location: [facility] OR;  Service:    • SKIN CANCER EXCISION         Family History   Problem Relation Age of Onset   • Cancer Mother         Basal cell cancer of the skin    • Cancer Father    • Cancer Brother         Basal cell cancer of the skin   • Diabetes Neg Hx    • Heart disease Neg Hx    • Hypertension Neg Hx        Social History     Socioeconomic History   • Marital status:      Spouse name: Not on file   • Number of children: Not on file   • Years of education: Not on file   • Highest education level: Not on file   Tobacco Use   • Smoking status: Former Smoker     Packs/day: 3.00     Years: 25.00     Pack years: 75.00     Types: Cigarettes     Last attempt  to quit: 1975     Years since quittin.1   • Smokeless tobacco: Never Used   Substance and Sexual Activity   • Alcohol use: No   • Drug use: No   • Sexual activity: Defer           Objective   Physical Exam   Constitutional: She is oriented to person, place, and time. She appears well-developed and well-nourished.  Non-toxic appearance. No distress.   HENT:   Head: Normocephalic and atraumatic.   Right Ear: External ear normal.   Left Ear: External ear normal.   Nose: Nose normal.   Mouth/Throat: Oropharynx is clear and moist and mucous membranes are normal. No oropharyngeal exudate. No tonsillar exudate.   Eyes: Pupils are equal, round, and reactive to light. Conjunctivae, EOM and lids are normal.   Neck: Normal range of motion and full passive range of motion without pain. Neck supple. No thyromegaly present.   Cardiovascular: Normal rate, regular rhythm, S1 normal, S2 normal, normal heart sounds, intact distal pulses and normal pulses.   Pulmonary/Chest: Effort normal and breath sounds normal. No tachypnea. No respiratory distress. She has no decreased breath sounds. She has no wheezes. She has no rales. She exhibits no tenderness.   Abdominal: Soft. Normal appearance and bowel sounds are normal. She exhibits no distension. There is tenderness. There is no rebound and no guarding.   Musculoskeletal: Normal range of motion. She exhibits no edema, tenderness or deformity.   Lymphadenopathy:     She has no cervical adenopathy.   Neurological: She is alert and oriented to person, place, and time. She has normal strength. No cranial nerve deficit or sensory deficit. GCS eye subscore is 4. GCS verbal subscore is 5. GCS motor subscore is 6.   Skin: Skin is warm, dry and intact. No rash noted. She is not diaphoretic. No erythema. No pallor.   Psychiatric: She has a normal mood and affect. Her speech is normal and behavior is normal. Judgment and thought content normal. Cognition and memory are normal.   Nursing note  and vitals reviewed.      Procedures           ED Course  ED Course as of Feb 03 0624   Sun Feb 02, 2020   2312 IMPRESSION:  1. Diffuse dilatation of air-filled thoracic esophagus.  2. Possible dilated bowel beneath the diaphragm, incompletely seen.  3. No active disease within the chest. Lungs clear.   XR Chest 2 View [ES]   Mon Feb 03, 2020   0203 IMPRESSION:  1.  Moderately dilated thoracic esophagus to the level of the esophagogastric junction. Nondistended stomach. No visible mass or additional abnormality at the esophagogastric junction. Recommend follow-up upper endoscopy.  2.  No acute abnormality within the abdomen or pelvis.   CT Abdomen Pelvis Without Contrast [ES]   0621 Normal sinus rhythm  Left bundle branch block  Abnormal ECG  When compared with ECG of 04-APR-2018 14:02,  T wave inversion less evident in Lateral leads  Vent. rate 73 BPM  MO interval 206 ms  QRS duration 140 ms  QT/QTc 420/462 ms  P-R-T axes 36 -7 135   ECG 12 Lead [ES]   0621 Patient reports feeling better with treatment in the emergency department, and is requested to be discharged home at this time.  I have informed the patient she needs to immediately follow-up with gastroenterology for further evaluation and treatment, and to return to the emergency department with any worsening symptoms.    [ES]      ED Course User Index  [ES] Kt Iqbal MD                                               MDM  Number of Diagnoses or Management Options  Esophagitis: new and requires workup     Amount and/or Complexity of Data Reviewed  Clinical lab tests: reviewed and ordered  Tests in the radiology section of CPT®: reviewed and ordered  Tests in the medicine section of CPT®: reviewed and ordered  Review and summarize past medical records: yes  Independent visualization of images, tracings, or specimens: yes    Risk of Complications, Morbidity, and/or Mortality  Presenting problems: moderate  Diagnostic procedures:  moderate  Management options: moderate    Patient Progress  Patient progress: stable      Final diagnoses:   Esophagitis            Kt Iqbal MD  02/03/20 0646

## 2020-02-04 ENCOUNTER — CONSULT (OUTPATIENT)
Dept: CARDIOLOGY | Facility: CLINIC | Age: 80
End: 2020-02-04

## 2020-02-04 VITALS
HEART RATE: 75 BPM | HEIGHT: 66 IN | OXYGEN SATURATION: 98 % | DIASTOLIC BLOOD PRESSURE: 71 MMHG | BODY MASS INDEX: 27 KG/M2 | SYSTOLIC BLOOD PRESSURE: 121 MMHG | WEIGHT: 168 LBS

## 2020-02-04 DIAGNOSIS — I78.1 TELANGIECTASIA: ICD-10-CM

## 2020-02-04 DIAGNOSIS — R60.0 BILATERAL LEG EDEMA: Primary | ICD-10-CM

## 2020-02-04 DIAGNOSIS — I83.813 VARICOSE VEINS OF BOTH LOWER EXTREMITIES WITH PAIN: ICD-10-CM

## 2020-02-04 DIAGNOSIS — I44.1 SECOND DEGREE AV BLOCK, MOBITZ TYPE II: ICD-10-CM

## 2020-02-04 PROCEDURE — 99213 OFFICE O/P EST LOW 20 MIN: CPT | Performed by: INTERNAL MEDICINE

## 2020-02-04 NOTE — PROGRESS NOTES
[facility] CARDIOLOGY  [address] [facility] [address]  [place] [address]  Phone: [phone]  Fax: [phone]    02/04/2020    Chief Complaint   Patient presents with   • Claudication   • Leg Swelling        History:   Tessa Fisher is a 79 y.o. female seen in follow up bilateral lower extremity edema.  She has BLE pain with walking. Unsteady gait. Starts after sitting for a period time.Desscribed as heaviness  It has progressed over the last year. Onset was 5 years ago.  Right is worse than left. Upon exam she has varicose veins and significant telangiectasia both extremities.She has a past medical history of diastolic congestive heart failure, GERD, S/P PPM for second degree AV block, and LBBB.    The chart and medications were reviewed today. Problems above addressed this visit.    Past Medical History:   Diagnosis Date   • Basal cell carcinoma 03/04/2016    Left groin   • Diastolic congestive heart failure (CMS/HCC)    • GERD (gastroesophageal reflux disease)    • Hiatal hernia    • LBBB (left bundle branch block)    • Pacemaker    • Popliteal cyst, right        Past Surgical History:   Procedure Laterality Date   • APPENDECTOMY  2015   • CARDIAC ELECTROPHYSIOLOGY PROCEDURE N/A 4/3/2018    Procedure: Pacemaker SC new;  Surgeon: Refugio Gautam MD;  Location: [facility] INVASIVE LOCATION;  Service: Cardiovascular   • CATARACT EXTRACTION     • COLONOSCOPY  1990   • COLONOSCOPY N/A 7/6/2017    Procedure: COLONOSCOPY;  Surgeon: Arun Phelps MD;  Location: [facility];  Service:    • ENDOSCOPY N/A 12/30/2016    Procedure: ESOPHAGOGASTRODUODENOSCOPY WITH ANESTHESIA;  Surgeon: Arun Phelps MD;  Location: [facility] OR;  Service:    • SKIN CANCER EXCISION          Past Social History:  Social History     Socioeconomic History   • Marital status:      Spouse name: Not on file   • Number of children: Not on file   • Years of education: Not on file   • Highest education  level: Not on file   Tobacco Use   • Smoking status: Former Smoker     Packs/day: 3.00     Years: 25.00     Pack years: 75.00     Types: Cigarettes     Last attempt to quit: 1975     Years since quittin.1   • Smokeless tobacco: Never Used   Substance and Sexual Activity   • Alcohol use: No   • Drug use: No   • Sexual activity: Defer       Past Family History:  Family History   Problem Relation Age of Onset   • Cancer Mother         Basal cell cancer of the skin    • Cancer Father    • Cancer Brother         Basal cell cancer of the skin   • Diabetes Neg Hx    • Heart disease Neg Hx    • Hypertension Neg Hx        Review of Systems:   Please see HPI  Constitution: No chills, no rigors, no unexplained weight loss or weight gain  Eyes:  No diplopia, no blurred vision, no loss of vision, conjunctiva is pink and sclera is anicteric  ENT:  No tinnitus, no otorrhea, no epistaxis, no sore throat   Respiratory: No cough, no hemoptysis  Cardiovascular: see HPI  Gastrointestinal: No nausea, no vomiting, no hematemesis, no diarrhea or constipation, no melena  Genitourinary: No frequency of dysuria no hematuria  Integument: No pruritis and  no skin rash  Hematologic / Lymphatic: No excessive bleeding, easy bruising, fatigue, lymphadenopathy and petechiae  Musculoskeletal: No joint pain, joint stiffness, joint swelling, muscle pain, muscle weakness and neck pain  Neurological: No dizziness, headaches, light headedness, seizures and vertigo  Endocrine: No frequent urination and nocturia, temperature intolerance, weight gain, unintended and weight loss, unintended      Current Outpatient Medications   Medication Sig Dispense Refill   • dicyclomine (BENTYL) 10 MG capsule Take 1 capsule by mouth Every 8 (Eight) Hours As Needed (spasm). 30 capsule 0   • pantoprazole (PROTONIX) 40 MG EC tablet Take 1 tablet by mouth Daily. 30 tablet 0   • sucralfate (CARAFATE) 1 GM/10ML suspension Take 10 mL by mouth 4 (Four) Times a Day for 7  days. 280 mL 0     No current facility-administered medications for this visit.         No Known Allergies    Objective:  Vitals:    02/04/20 1527   BP: 121/71   Pulse: 75   SpO2: 98%     Comfortable NAD  PERRL, conjunctiva clear  Neck supple, no JVD or thyromegaly appreciated  S1/S2 RRR, no m/r/g  Lungs CTA B, normal effort  Abdomen S/NT/ND (+) BS, no HSM appreciated  Extremities warm, no clubbing, cyanosis, or edema  Normal gait  No visible or palpable skin lesions  A/Ox4, mood and affect appropriate  Pulse exam:    Feet are warm bilateral  1+ edema bilateral  Capillary refill is normal  No evidence of ulceration or color change of the toes  PULSES  Right DP and PT are 1+ and Left DP and PT are 2+    DATA:      Results for orders placed during the hospital encounter of 01/16/20   Adult Transthoracic Echo Complete W/ Cont if Necessary Per Protocol    Narrative · Normal left ventricular cavity size and wall thickness noted. All left   ventricular wall segments contract normally.  · Estimated EF appears to be in the range of 66 - 70%  · Left ventricular diastolic dysfunction (grade I) consistent with   impaired relaxation.  · The aortic valve is structurally normal. Trace aortic valve   regurgitation is present. No aortic valve stenosis is present.  · The mitral valve is normal in structure. Mild mitral valve regurgitation   is present. No significant mitral valve stenosis is present.  · The tricuspid valve is normal. No tricuspid valve regurgitation is   present  · There is no evidence of pericardial effusion.         Results for orders placed during the hospital encounter of 02/28/19   Stress Test With Myocardial Perfusion    Narrative · Myocardial perfusion imaging indicates a normal myocardial perfusion   study with no evidence of ischemia.  · Left ventricular ejection fraction is normal (Calculated EF = 64%).  · Normal LV cavity size. Normal LV wall motion noted.  · Impressions are consistent with a low risk  study.         Results for orders placed during the hospital encounter of 02/28/19   Stress Test With Myocardial Perfusion    Narrative · Myocardial perfusion imaging indicates a normal myocardial perfusion   study with no evidence of ischemia.  · Left ventricular ejection fraction is normal (Calculated EF = 64%).  · Normal LV cavity size. Normal LV wall motion noted.  · Impressions are consistent with a low risk study.          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  PVD with claudication bilaterally  S/P PPM for second degree AV block  diastolic congestive heart failure, compensated    Plan:  Proceed with a Venous reflux study.  Discussed the possibility of EVLT on test results are complete.  I will see her back in 3 months or sooner if needed.     Patient's Body mass index is 27.12 kg/m². BMI is within normal parameters. No follow-up required..         ICD-10-CM ICD-9-CM   1. Bilateral leg edema R60.0 782.3   2. Varicose veins of both lower extremities with pain I83.813 454.8   3. Telangiectasia I78.1 448.9   4. Second degree AV block, Mobitz type II, status post permanent pacemaker implantation with a [employer] device on 4/3/18. I44.1 426.12        Thank you for allowing me to participate in the care of Tessa Fisher. Feel free to contact me directly with any further questions or concerns.        Director, Cardiac Cath Lab    BOBBI Mcdaniel, acting as scribe for Isaac Aviles MD, [facility], [facility].   02/05/20  12:09 PM     I have read and agree the documentation that has been completed regarding this visit.  By signing this record, I attest that the documentation was completed by my physical presence and is an accurate record of the encounter.  Voice recognition / transcription technology used for some documentation in this chart in attempt to mitigate substantial inefficiencies created by this  electronic health record technology.  As a result, there may be some typos and.or nonsensical language introduced into the chart that either overlooked in editing/review and/or that I am unable to correct as patient care needs require me to prioritize my attention to bedside patient care rather than electronic documentation. MA

## 2020-02-12 ENCOUNTER — HOSPITAL ENCOUNTER (OUTPATIENT)
Dept: CARDIOLOGY | Facility: HOSPITAL | Age: 80
Discharge: HOME | End: 2020-02-12
Admitting: INTERNAL MEDICINE

## 2020-02-12 DIAGNOSIS — R60.0 BILATERAL LEG EDEMA: ICD-10-CM

## 2020-02-12 DIAGNOSIS — I83.813 VARICOSE VEINS OF BOTH LOWER EXTREMITIES WITH PAIN: ICD-10-CM

## 2020-02-12 PROCEDURE — 93970 EXTREMITY STUDY: CPT | Performed by: RADIOLOGY

## 2020-02-12 PROCEDURE — 93970 EXTREMITY STUDY: CPT

## 2020-02-17 ENCOUNTER — TELEPHONE (OUTPATIENT)
Dept: CARDIOLOGY | Facility: CLINIC | Age: 80
End: 2020-02-17

## 2020-02-17 NOTE — TELEPHONE ENCOUNTER
----- Message from Jacqui Krause MA sent at 2/13/2020 10:25 AM EST -----  Normal study per Dr. Aviles

## 2020-02-17 NOTE — TELEPHONE ENCOUNTER
Called patient to let her know that Dr. Aviles had reviewed her Venous Doppler and that it was normal. Patient is aware and understands.

## 2020-03-16 ENCOUNTER — TELEPHONE (OUTPATIENT)
Dept: CARDIOLOGY | Facility: CLINIC | Age: 80
End: 2020-03-16

## 2020-03-18 NOTE — TELEPHONE ENCOUNTER
Returned call to patient and explained that I had called her on 03/13/2020 for phone pacer check. She now has the [employer] home monitor system. I will note accordingly.

## 2020-03-19 ENCOUNTER — TREATMENT (OUTPATIENT)
Dept: CARDIOLOGY | Facility: CLINIC | Age: 80
End: 2020-03-19

## 2020-03-19 DIAGNOSIS — I49.5 SSS (SICK SINUS SYNDROME) (HCC): Primary | ICD-10-CM

## 2020-03-19 PROCEDURE — 93296 REM INTERROG EVL PM/IDS: CPT | Performed by: INTERNAL MEDICINE

## 2020-03-19 PROCEDURE — 93294 REM INTERROG EVL PM/LDLS PM: CPT | Performed by: INTERNAL MEDICINE

## 2020-05-20 ENCOUNTER — OFFICE VISIT (OUTPATIENT)
Dept: CARDIOLOGY | Facility: CLINIC | Age: 80
End: 2020-05-20

## 2020-05-20 VITALS
SYSTOLIC BLOOD PRESSURE: 127 MMHG | HEART RATE: 65 BPM | TEMPERATURE: 97.9 F | WEIGHT: 171.6 LBS | DIASTOLIC BLOOD PRESSURE: 77 MMHG | HEIGHT: 66 IN | BODY MASS INDEX: 27.58 KG/M2 | RESPIRATION RATE: 16 BRPM

## 2020-05-20 DIAGNOSIS — I44.1 SECOND DEGREE AV BLOCK, MOBITZ TYPE II: Primary | ICD-10-CM

## 2020-05-20 DIAGNOSIS — R00.2 PALPITATIONS: ICD-10-CM

## 2020-05-20 PROCEDURE — 99213 OFFICE O/P EST LOW 20 MIN: CPT | Performed by: INTERNAL MEDICINE

## 2020-05-20 NOTE — PROGRESS NOTES
Shilpa Perrin APRN  Tessa Fisher  1940 05/20/2020    Patient Active Problem List   Diagnosis   • Gastroesophageal reflux disease without esophagitis   • Right leg pain   • Generalized weakness   • Near syncope   • Screening   • Syncope   • Symptomatic bradycardia   • Former smoker   • History of pneumothorax   • Pacemaker   • Second degree AV block, Mobitz type II, status post permanent pacemaker implantation with a [employer] device on 4/3/18.   • Precordial pain   • Bilateral leg edema       Dear Shilpa Perrin, HEENA:    Subjective     Tessa Fisher is a 79 y.o. female with the problems as listed above, presents    Chief Complaint: Follow-up of second-degree AV block, status post permanent pacemaker implantation.     History of Present Illness: Ms. Fisher is a pleasant 79-year-old  female with history of high-grade second-degree AV block for which she has had permanent dual-chamber pacemaker implanted in April 2018.  She is here for regular cardiology follow-up.  On today's visit she complains of occasional palpitations but overall has been doing well.  She denies any dizziness or syncope.  These palpitations seem to happen infrequently.  They usually last for a few seconds.    No Known Allergies:      Current Outpatient Medications:   •  dicyclomine (BENTYL) 10 MG capsule, Take 1 capsule by mouth Every 8 (Eight) Hours As Needed (spasm)., Disp: 30 capsule, Rfl: 0  •  pantoprazole (PROTONIX) 40 MG EC tablet, Take 1 tablet by mouth Daily., Disp: 30 tablet, Rfl: 0      The following portions of the patient's history were reviewed and updated as appropriate: allergies, current medications, past family history, past medical history, past social history, past surgical history and problem list.    Social History     Tobacco Use   • Smoking status: Former Smoker     Packs/day: 3.00     Years: 25.00     Pack years: 75.00     Types: Cigarettes     Last attempt to quit:  "1975     Years since quittin.4   • Smokeless tobacco: Never Used   Substance Use Topics   • Alcohol use: No   • Drug use: No       Review of Systems   Cardiovascular: Positive for leg swelling and palpitations. Negative for chest pain.   Respiratory: Negative for shortness of breath.        Objective   Vitals:    20 1251   BP: 127/77   Pulse: 65   Resp: 16   Temp: 97.9 °F (36.6 °C)   Weight: 77.8 kg (171 lb 9.6 oz)   Height: 167.6 cm (66\")     Body mass index is 27.7 kg/m².    Physical Exam   Constitutional: She is oriented to person, place, and time. She appears well-developed and well-nourished.   HENT:   Mouth/Throat: Oropharynx is clear and moist.   Eyes: Pupils are equal, round, and reactive to light. EOM are normal.   Neck: Neck supple. No JVD present. No tracheal deviation present. No thyromegaly present.   Cardiovascular: Normal rate, regular rhythm, S1 normal and S2 normal. Exam reveals no gallop and no friction rub.   No murmur heard.  Pulmonary/Chest: Effort normal and breath sounds normal.   Abdominal: Soft. Bowel sounds are normal. She exhibits no mass. There is no tenderness.   Musculoskeletal: Normal range of motion. She exhibits edema (Chronic 2+ bilateral leg edema.).   Lymphadenopathy:     She has no cervical adenopathy.   Neurological: She is alert and oriented to person, place, and time.   Skin: Skin is warm and dry. No rash noted.   Psychiatric: She has a normal mood and affect.       Tessa Fisher   Echo Complete w/Doppler and Color Flow   Order# [id number]   Reading physician:   Refugio Gautam MD Ordering physician:   Refugio Gautam MD Study date: 20   Patient Information     Patient Name  Tessa Fisher MRN  [id number] Sex  Female  (Age)  1940 (79 y.o.)   Interpretation Summary     · Normal left ventricular cavity size and wall thickness noted. All left ventricular wall segments contract normally.  · Estimated EF appears to be in the range of 66 - " 70%  · Left ventricular diastolic dysfunction (grade I) consistent with impaired relaxation.  · The aortic valve is structurally normal. Trace aortic valve regurgitation is present. No aortic valve stenosis is present.  · The mitral valve is normal in structure. Mild mitral valve regurgitation is present. No significant mitral valve stenosis is present.  · The tricuspid valve is normal. No tricuspid valve regurgitation is present  · There is no evidence of pericardial effusion.       Lab Results   Component Value Date     (L) 02/02/2020    K 4.0 02/02/2020    CL 98 02/02/2020    CO2 22.4 02/02/2020    BUN 14 02/02/2020    CREATININE 0.65 02/02/2020    GLUCOSE 94 02/02/2020    CALCIUM 9.5 02/02/2020    AST 17 02/02/2020    ALT 14 02/02/2020    ALKPHOS 88 02/02/2020    LABIL2 1.5 05/29/2015     Lab Results   Component Value Date    CKTOTAL 49 04/02/2018     Lab Results   Component Value Date    WBC 7.77 02/02/2020    HGB 13.1 02/02/2020    HCT 40.6 02/02/2020     02/02/2020     Lab Results   Component Value Date    INR 1.06 04/04/2018    INR 0.94 04/16/2017     Lab Results   Component Value Date    MG 2.2 04/01/2018     Lab Results   Component Value Date    TSH 2.641 04/18/2017    TRIG 87 04/17/2017    HDL 53 (L) 04/17/2017    LDL 83 04/17/2017      Lab Results   Component Value Date    BNP 45.0 04/01/2018       Assessment/Plan :   Diagnosis Plan   1. Second degree AV block, Mobitz type II, status post permanent pacemaker implantation with a [employer] device on 4/3/18.,  Clinically stable.     2. Palpitations         Recommendations:  1. Since the palpitations are very brief and infrequent, will leave her alone.  If she has increasing palpitations, will consider adding a beta-blocker.    Return in about 6 months (around 11/20/2020).    As always, Shilpa  I appreciate very much the opportunity to participate in the cardiovascular care of your patients. Please do not hesitate to call me with any  questions with regards to Tessa Fisher's evaluation and management.     With Best Regards,        Refugio Gautam MD, [facility]    Please note that portions of this note were completed with a voice recognition program.

## 2020-06-24 ENCOUNTER — CLINICAL SUPPORT (OUTPATIENT)
Dept: CARDIOLOGY | Facility: CLINIC | Age: 80
End: 2020-06-24

## 2020-06-24 DIAGNOSIS — I49.5 SSS (SICK SINUS SYNDROME) (HCC): Primary | ICD-10-CM

## 2020-06-24 PROCEDURE — 93288 INTERROG EVL PM/LDLS PM IP: CPT | Performed by: INTERNAL MEDICINE

## 2020-10-02 ENCOUNTER — OFFICE VISIT (OUTPATIENT)
Dept: FAMILY MEDICINE CLINIC | Facility: CLINIC | Age: 80
End: 2020-10-02

## 2020-10-02 VITALS
HEART RATE: 75 BPM | WEIGHT: 175 LBS | HEIGHT: 66 IN | BODY MASS INDEX: 28.12 KG/M2 | OXYGEN SATURATION: 98 % | DIASTOLIC BLOOD PRESSURE: 68 MMHG | SYSTOLIC BLOOD PRESSURE: 124 MMHG | TEMPERATURE: 96.9 F

## 2020-10-02 DIAGNOSIS — M79.605 BILATERAL LEG PAIN: ICD-10-CM

## 2020-10-02 DIAGNOSIS — E55.9 VITAMIN D DEFICIENCY: ICD-10-CM

## 2020-10-02 DIAGNOSIS — M79.604 BILATERAL LEG PAIN: ICD-10-CM

## 2020-10-02 DIAGNOSIS — R60.0 BILATERAL LEG EDEMA: Primary | ICD-10-CM

## 2020-10-02 PROCEDURE — 99213 OFFICE O/P EST LOW 20 MIN: CPT | Performed by: NURSE PRACTITIONER

## 2020-10-02 PROCEDURE — 80053 COMPREHEN METABOLIC PANEL: CPT | Performed by: NURSE PRACTITIONER

## 2020-10-02 PROCEDURE — 84443 ASSAY THYROID STIM HORMONE: CPT | Performed by: NURSE PRACTITIONER

## 2020-10-02 PROCEDURE — 83735 ASSAY OF MAGNESIUM: CPT | Performed by: NURSE PRACTITIONER

## 2020-10-02 PROCEDURE — 85025 COMPLETE CBC W/AUTO DIFF WBC: CPT | Performed by: NURSE PRACTITIONER

## 2020-10-02 PROCEDURE — 82607 VITAMIN B-12: CPT | Performed by: NURSE PRACTITIONER

## 2020-10-02 PROCEDURE — 82306 VITAMIN D 25 HYDROXY: CPT | Performed by: NURSE PRACTITIONER

## 2020-10-02 RX ORDER — BUMETANIDE 1 MG/1
1 TABLET ORAL DAILY
Qty: 30 TABLET | Refills: 0 | Status: SHIPPED | OUTPATIENT
Start: 2020-10-02 | End: 2020-11-12 | Stop reason: SDUPTHER

## 2020-10-02 NOTE — PROGRESS NOTES
Subjective   Tessa Fisher is a 79 y.o. female.     Chief Complaint: Leg Pain (bilateral) and Edema    History of Present Illness   Leg Swelling   This is a chronic problem. The current episode started more than 1 year ago. The problem occurs daily. Associated symptoms include arthralgias. Associated symptoms comments: Swelling resolves with lying down; does not wish to take any medication. The symptoms are aggravated by standing and walking. She has tried lying down and rest for the symptoms. The treatment provided significant relief.  Swelling does mostly resolve after lying down at night.  She does complain in bilateral lower extremities throughout the day and sometimes at night when she moves her legs.   She has tried lasix that did not help.  She has discussed with Dr Gautam, cardiologist, and also been evaluated by Dr Aviles, cardiolgist, without any relief of symptoms.     Family History   Problem Relation Age of Onset   • Cancer Mother         Basal cell cancer of the skin    • Cancer Father    • Cancer Brother         Basal cell cancer of the skin   • Diabetes Neg Hx    • Heart disease Neg Hx    • Hypertension Neg Hx        Social History     Socioeconomic History   • Marital status:      Spouse name: Not on file   • Number of children: Not on file   • Years of education: Not on file   • Highest education level: Not on file   Tobacco Use   • Smoking status: Former Smoker     Packs/day: 3.00     Years: 25.00     Pack years: 75.00     Types: Cigarettes     Quit date:      Years since quittin.7   • Smokeless tobacco: Never Used   Substance and Sexual Activity   • Alcohol use: No   • Drug use: No   • Sexual activity: Defer       Past Medical History:   Diagnosis Date   • Basal cell carcinoma 2016    Left groin   • Diastolic congestive heart failure (CMS/HCC)    • GERD (gastroesophageal reflux disease)    • Hiatal hernia    • LBBB (left bundle branch block)    • Pacemaker    • Popliteal  "cyst, right        Review of Systems   Constitutional: Negative.    HENT: Negative.    Respiratory: Negative.    Cardiovascular: Negative.    Gastrointestinal: Negative.    Musculoskeletal: Negative.    Skin: Negative.    Neurological: Negative.    Psychiatric/Behavioral: Negative.        Objective   Physical Exam  Vitals signs and nursing note reviewed.   Constitutional:       Appearance: She is well-developed.   Neck:      Musculoskeletal: Normal range of motion and neck supple.   Cardiovascular:      Rate and Rhythm: Normal rate and regular rhythm.      Heart sounds: Normal heart sounds.   Pulmonary:      Effort: Pulmonary effort is normal.      Breath sounds: Normal breath sounds.   Musculoskeletal:      Comments: Bilateral lower extremity pitting edema   Skin:     General: Skin is warm and dry.   Neurological:      Mental Status: She is alert and oriented to person, place, and time.   Psychiatric:         Behavior: Behavior normal.         Thought Content: Thought content normal.         Judgment: Judgment normal.         Procedures    Vitals: Blood pressure 124/68, pulse 75, temperature 96.9 °F (36.1 °C), height 167.6 cm (66\"), weight 79.4 kg (175 lb), SpO2 98 %, not currently breastfeeding.    Body mass index is 28.25 kg/m².     Allergies: No Known Allergies     During this visit the following were done:  Labs Reviewed []    Labs Ordered []    Radiology Reports Reviewed []    Radiology Ordered []    PCP Records Reviewed []    Referring Provider Records Reviewed []    ER Records Reviewed []    Hospital Records Reviewed []    History Obtained From Family []    Radiology Images Reviewed []    Other Reviewed []    Records Requested []      Assessment/Plan   Tessa was seen today for leg pain and edema.    Diagnoses and all orders for this visit:    Bilateral leg edema  -     bumetanide (BUMEX) 1 MG tablet; Take 1 tablet by mouth Daily.  -     CBC & Differential; Future  -     Magnesium; Future  -     Comprehensive " Metabolic Panel; Future  -     TSH; Future  -     Vitamin B12; Future  -     Vitamin D 25 Hydroxy; Future  -     CBC & Differential  -     Magnesium  -     Comprehensive Metabolic Panel  -     TSH  -     Vitamin B12  -     Vitamin D 25 Hydroxy  -     CBC Auto Differential    Bilateral leg pain  -     Vitamin D 25 Hydroxy; Future  -     Vitamin D 25 Hydroxy    Vitamin D deficiency  -     Vitamin D 25 Hydroxy; Future  -     Vitamin D 25 Hydroxy       Start bumex.  RTC in one week for re-evaluation

## 2020-10-03 LAB
25(OH)D3 SERPL-MCNC: 28.3 NG/ML (ref 30–100)
ALBUMIN SERPL-MCNC: 4.5 G/DL (ref 3.5–5.2)
ALBUMIN/GLOB SERPL: 1.7 G/DL
ALP SERPL-CCNC: 82 U/L (ref 39–117)
ALT SERPL W P-5'-P-CCNC: 19 U/L (ref 1–33)
ANION GAP SERPL CALCULATED.3IONS-SCNC: 8.8 MMOL/L (ref 5–15)
AST SERPL-CCNC: 21 U/L (ref 1–32)
BASOPHILS # BLD AUTO: 0.07 10*3/MM3 (ref 0–0.2)
BASOPHILS NFR BLD AUTO: 1.1 % (ref 0–1.5)
BILIRUB SERPL-MCNC: 0.5 MG/DL (ref 0–1.2)
BUN SERPL-MCNC: 12 MG/DL (ref 8–23)
BUN/CREAT SERPL: 16.9 (ref 7–25)
CALCIUM SPEC-SCNC: 9.9 MG/DL (ref 8.6–10.5)
CHLORIDE SERPL-SCNC: 100 MMOL/L (ref 98–107)
CO2 SERPL-SCNC: 26.2 MMOL/L (ref 22–29)
CREAT SERPL-MCNC: 0.71 MG/DL (ref 0.57–1)
DEPRECATED RDW RBC AUTO: 42.3 FL (ref 37–54)
EOSINOPHIL # BLD AUTO: 0.15 10*3/MM3 (ref 0–0.4)
EOSINOPHIL NFR BLD AUTO: 2.3 % (ref 0.3–6.2)
ERYTHROCYTE [DISTWIDTH] IN BLOOD BY AUTOMATED COUNT: 13.1 % (ref 12.3–15.4)
GFR SERPL CREATININE-BSD FRML MDRD: 80 ML/MIN/1.73
GLOBULIN UR ELPH-MCNC: 2.7 GM/DL
GLUCOSE SERPL-MCNC: 92 MG/DL (ref 65–99)
HCT VFR BLD AUTO: 40.4 % (ref 34–46.6)
HGB BLD-MCNC: 13.6 G/DL (ref 12–15.9)
IMM GRANULOCYTES # BLD AUTO: 0.02 10*3/MM3 (ref 0–0.05)
IMM GRANULOCYTES NFR BLD AUTO: 0.3 % (ref 0–0.5)
LYMPHOCYTES # BLD AUTO: 2.21 10*3/MM3 (ref 0.7–3.1)
LYMPHOCYTES NFR BLD AUTO: 34 % (ref 19.6–45.3)
MAGNESIUM SERPL-MCNC: 2.3 MG/DL (ref 1.6–2.4)
MCH RBC QN AUTO: 29.9 PG (ref 26.6–33)
MCHC RBC AUTO-ENTMCNC: 33.7 G/DL (ref 31.5–35.7)
MCV RBC AUTO: 88.8 FL (ref 79–97)
MONOCYTES # BLD AUTO: 0.57 10*3/MM3 (ref 0.1–0.9)
MONOCYTES NFR BLD AUTO: 8.8 % (ref 5–12)
NEUTROPHILS NFR BLD AUTO: 3.48 10*3/MM3 (ref 1.7–7)
NEUTROPHILS NFR BLD AUTO: 53.5 % (ref 42.7–76)
NRBC BLD AUTO-RTO: 0 /100 WBC (ref 0–0.2)
PLATELET # BLD AUTO: 226 10*3/MM3 (ref 140–450)
PMV BLD AUTO: 10.7 FL (ref 6–12)
POTASSIUM SERPL-SCNC: 5.1 MMOL/L (ref 3.5–5.2)
PROT SERPL-MCNC: 7.2 G/DL (ref 6–8.5)
RBC # BLD AUTO: 4.55 10*6/MM3 (ref 3.77–5.28)
SODIUM SERPL-SCNC: 135 MMOL/L (ref 136–145)
TSH SERPL DL<=0.05 MIU/L-ACNC: 3.78 UIU/ML (ref 0.27–4.2)
VIT B12 BLD-MCNC: 355 PG/ML (ref 211–946)
WBC # BLD AUTO: 6.5 10*3/MM3 (ref 3.4–10.8)

## 2020-10-06 ENCOUNTER — TELEPHONE (OUTPATIENT)
Dept: FAMILY MEDICINE CLINIC | Facility: CLINIC | Age: 80
End: 2020-10-06

## 2020-10-06 NOTE — PROGRESS NOTES
Her labs look good.  Her Vit D is a little low at 28.3.  She might consider taking Vit D 1000iu otc daily if she is not taking any.  Please let her know.

## 2020-10-06 NOTE — TELEPHONE ENCOUNTER
----- Message from HEENA Spain sent at 10/6/2020 10:12 AM EDT -----  Her labs look good.  Her Vit D is a little low at 28.3.  She might consider taking Vit D 1000iu otc daily if she is not taking any.  Please let her know.       Patient notified & verbalized understanding.

## 2020-10-13 ENCOUNTER — OFFICE VISIT (OUTPATIENT)
Dept: FAMILY MEDICINE CLINIC | Facility: CLINIC | Age: 80
End: 2020-10-13

## 2020-10-13 VITALS
SYSTOLIC BLOOD PRESSURE: 120 MMHG | BODY MASS INDEX: 27.77 KG/M2 | OXYGEN SATURATION: 98 % | DIASTOLIC BLOOD PRESSURE: 80 MMHG | TEMPERATURE: 96.6 F | HEART RATE: 77 BPM | HEIGHT: 66 IN | WEIGHT: 172.8 LBS

## 2020-10-13 DIAGNOSIS — R60.0 BILATERAL LEG EDEMA: Primary | ICD-10-CM

## 2020-10-13 PROCEDURE — 99213 OFFICE O/P EST LOW 20 MIN: CPT | Performed by: NURSE PRACTITIONER

## 2020-10-13 NOTE — PROGRESS NOTES
Subjective   Tessa Fisher is a 79 y.o. female.     Chief Complaint: Leg Swelling    History of Present Illness   Leg Swelling   This is a chronic problem. The current episode started more than 1 year ago. The problem occurs daily. Associated symptoms include arthralgias. Associated symptoms comments: Swelling resolves with lying down; does not wish to take any medication. The symptoms are aggravated by standing and walking. She has tried lying down and rest for the symptoms. The treatment provided significant relief.  Swelling does mostly resolve after lying down at night.  She does complain in bilateral lower extremities throughout the day and sometimes at night when she moves her legs.   She has tried lasix that did not help.  She has discussed with Dr Gautam, cardiologist, and also been evaluated by Dr Aviles, cardiolgist, without any relief of symptoms.   Patient was started on bumex last week for her symptoms.  She has also been measured for support stockings/JOSHUA hose and she is currently wearing those.  Her symptoms have gotten some better with her edema since starting the Bumex and using the stockings.  Her labs look good.  Her Vit D is a little low at 28.3.  She might consider taking Vit D 1000iu otc daily if she is not taking any.  Please let her know.   Family History   Problem Relation Age of Onset   • Cancer Mother         Basal cell cancer of the skin    • Cancer Father    • Cancer Brother         Basal cell cancer of the skin   • Diabetes Neg Hx    • Heart disease Neg Hx    • Hypertension Neg Hx        Social History     Socioeconomic History   • Marital status:      Spouse name: Not on file   • Number of children: Not on file   • Years of education: Not on file   • Highest education level: Not on file   Tobacco Use   • Smoking status: Former Smoker     Packs/day: 3.00     Years: 25.00     Pack years: 75.00     Types: Cigarettes     Quit date:      Years since quittin.8   • Smokeless  "tobacco: Never Used   Substance and Sexual Activity   • Alcohol use: No   • Drug use: No   • Sexual activity: Defer       Past Medical History:   Diagnosis Date   • Basal cell carcinoma 03/04/2016    Left groin   • Diastolic congestive heart failure (CMS/HCC)    • GERD (gastroesophageal reflux disease)    • Hiatal hernia    • LBBB (left bundle branch block)    • Pacemaker    • Popliteal cyst, right        Review of Systems   Constitutional: Negative.    HENT: Negative.    Respiratory: Negative.    Cardiovascular: Positive for leg swelling.   Gastrointestinal: Negative.    Musculoskeletal: Negative.    Skin: Negative.    Neurological: Negative.    Psychiatric/Behavioral: Negative.        Objective   Physical Exam  Vitals signs and nursing note reviewed.   Constitutional:       Appearance: She is well-developed.   Neck:      Musculoskeletal: Normal range of motion and neck supple.   Cardiovascular:      Rate and Rhythm: Normal rate and regular rhythm.      Heart sounds: Normal heart sounds.   Pulmonary:      Effort: Pulmonary effort is normal.      Breath sounds: Normal breath sounds.   Musculoskeletal:      Comments: Bilateral lower extremity edema; JOSHUA hose, edema seems to have improved   Skin:     General: Skin is warm and dry.   Neurological:      Mental Status: She is alert and oriented to person, place, and time.   Psychiatric:         Behavior: Behavior normal.         Thought Content: Thought content normal.         Judgment: Judgment normal.         Procedures    Vitals: Blood pressure 120/80, pulse 77, temperature 96.6 °F (35.9 °C), temperature source Temporal, height 167.6 cm (65.98\"), weight 78.4 kg (172 lb 12.8 oz), SpO2 98 %, not currently breastfeeding.    Body mass index is 27.9 kg/m².     Allergies: No Known Allergies     During this visit the following were done:  Labs Reviewed []    Labs Ordered []    Radiology Reports Reviewed []    Radiology Ordered []    PCP Records Reviewed []    Referring " Provider Records Reviewed []    ER Records Reviewed []    Hospital Records Reviewed []    History Obtained From Family []    Radiology Images Reviewed []    Other Reviewed []    Records Requested []      Assessment/Plan   Diagnoses and all orders for this visit:    1. Bilateral leg edema (Primary)    Continue Bumex for now.  I do suggest that she take 1/2 tablet daily.  She is to continue wearing her support stockings/JOSHUA hose

## 2020-11-02 ENCOUNTER — TREATMENT (OUTPATIENT)
Dept: CARDIOLOGY | Facility: CLINIC | Age: 80
End: 2020-11-02

## 2020-11-02 DIAGNOSIS — I49.5 SSS (SICK SINUS SYNDROME) (HCC): Primary | ICD-10-CM

## 2020-11-02 PROCEDURE — 93294 REM INTERROG EVL PM/LDLS PM: CPT | Performed by: INTERNAL MEDICINE

## 2020-11-02 PROCEDURE — 93296 REM INTERROG EVL PM/IDS: CPT | Performed by: INTERNAL MEDICINE

## 2020-11-12 DIAGNOSIS — R60.0 BILATERAL LEG EDEMA: ICD-10-CM

## 2020-11-12 RX ORDER — BUMETANIDE 1 MG/1
1 TABLET ORAL DAILY
Qty: 30 TABLET | Refills: 1 | Status: SHIPPED | OUTPATIENT
Start: 2020-11-12 | End: 2021-01-12 | Stop reason: SDUPTHER

## 2020-11-19 ENCOUNTER — OFFICE VISIT (OUTPATIENT)
Dept: CARDIOLOGY | Facility: CLINIC | Age: 80
End: 2020-11-19

## 2020-11-19 VITALS
HEART RATE: 67 BPM | WEIGHT: 177 LBS | HEIGHT: 66 IN | TEMPERATURE: 97.3 F | SYSTOLIC BLOOD PRESSURE: 126 MMHG | DIASTOLIC BLOOD PRESSURE: 80 MMHG | BODY MASS INDEX: 28.45 KG/M2

## 2020-11-19 DIAGNOSIS — I44.1 SECOND DEGREE AV BLOCK, MOBITZ TYPE II: Primary | ICD-10-CM

## 2020-11-19 PROCEDURE — 93000 ELECTROCARDIOGRAM COMPLETE: CPT | Performed by: INTERNAL MEDICINE

## 2020-11-19 PROCEDURE — 99213 OFFICE O/P EST LOW 20 MIN: CPT | Performed by: INTERNAL MEDICINE

## 2020-11-19 NOTE — PROGRESS NOTES
Shilpa Perrin APRN  Tessa Fisher  1940    Patient Active Problem List   Diagnosis   • Gastroesophageal reflux disease without esophagitis   • Right leg pain   • Generalized weakness   • Near syncope   • Screening   • Syncope   • Symptomatic bradycardia   • Former smoker   • History of pneumothorax   • Pacemaker   • Second degree AV block, Mobitz type II, status post permanent pacemaker implantation with a [place] [employer] device on 4/3/18.   • Precordial pain   • Bilateral leg edema       Dear Shilpa Perrin APRN:    Subjective     Tessa Fisher is a 79 y.o. female with the problems as listed above, presents    Chief complaint: Follow-up of permanent pacemaker implantation.    History of Present Illness: Ms. Fisher is a pleasant 79-year-old  female with a history of high-grade second-degree AV block for which she has had permanent dual-chamber pacemaker implanted in 2018.  She is here for regular cardiology follow-up.  On today's visit she complains of occasional palpitations which have been brief and not associated with any dizziness or syncope.  Overall she has been doing well.    Tessa Fisher  Echo Complete w/Doppler and Color Flow  Order# [id number]  Reading physician:   Refugio Gautam MD Ordering physician:   Refugio Gautam MD Study date: 20   Patient Information    Patient Name   Tessa Fisher MRN   [id number] Sex   Female  (Age)   1940 (79 y.o.)   Interpretation Summary    · Normal left ventricular cavity size and wall thickness noted. All left ventricular wall segments contract normally.  · Estimated EF appears to be in the range of 66 - 70%  · Left ventricular diastolic dysfunction (grade I) consistent with impaired relaxation.  · The aortic valve is structurally normal. Trace aortic valve regurgitation is present. No aortic valve stenosis is present.  · The mitral valve is normal in structure. Mild mitral valve  "regurgitation is present. No significant mitral valve stenosis is present.  · The tricuspid valve is normal. No tricuspid valve regurgitation is present  · There is no evidence of pericardial effusion.           No Known Allergies:      Current Outpatient Medications:   •  bumetanide (BUMEX) 1 MG tablet, Take 1 tablet by mouth Daily., Disp: 30 tablet, Rfl: 1  •  pantoprazole (PROTONIX) 40 MG EC tablet, Take 1 tablet by mouth Daily., Disp: 30 tablet, Rfl: 0      The following portions of the patient's history were reviewed and updated as appropriate: allergies, current medications, past family history, past medical history, past social history, past surgical history and problem list.    Social History     Tobacco Use   • Smoking status: Former Smoker     Packs/day: 3.00     Years: 25.00     Pack years: 75.00     Types: Cigarettes     Quit date:      Years since quittin.9   • Smokeless tobacco: Never Used   Substance Use Topics   • Alcohol use: No   • Drug use: No       Review of Systems   Constitution: Negative for chills and fever.   HENT: Negative for nosebleeds and sore throat.    Respiratory: Negative for cough, hemoptysis and wheezing.    Gastrointestinal: Negative for abdominal pain, hematemesis, hematochezia, melena, nausea and vomiting.   Genitourinary: Negative for dysuria and hematuria.   Neurological: Negative for headaches.       Objective   Vitals:    20 1221   BP: 126/80   Pulse: 67   Temp: 97.3 °F (36.3 °C)   Weight: 80.3 kg (177 lb)   Height: 167.6 cm (66\")     Body mass index is 28.57 kg/m².        Constitutional:       Appearance: Well-developed.   Eyes:      Conjunctiva/sclera: Conjunctivae normal.   HENT:      Head: Normocephalic.   Neck:      Musculoskeletal: Normal range of motion and neck supple.      Thyroid: No thyromegaly.      Vascular: No JVD.      Trachea: No tracheal deviation.   Pulmonary:      Effort: No respiratory distress.      Breath sounds: Normal breath sounds. No " wheezing. No rales.   Cardiovascular:      PMI at left midclavicular line. Normal rate. Regular rhythm. Normal S1. Normal S2.      Murmurs: There is no murmur.      No gallop. No click. No rub.   Pulses:     Intact distal pulses.   Edema:     Pretibial: bilateral 2+ edema of the pretibial area.     Ankle: bilateral 2+ edema of the ankle.     Feet: bilateral 2+ edema of the feet.  Abdominal:      General: Bowel sounds are normal.      Palpations: Abdomen is soft. There is no abdominal mass.      Tenderness: There is no abdominal tenderness.   Skin:     General: Skin is warm and dry.   Neurological:      Mental Status: Alert and oriented to person, place, and time.      Cranial Nerves: No cranial nerve deficit.         Lab Results   Component Value Date     (L) 10/02/2020    K 5.1 10/02/2020     10/02/2020    CO2 26.2 10/02/2020    BUN 12 10/02/2020    CREATININE 0.71 10/02/2020    GLUCOSE 92 10/02/2020    CALCIUM 9.9 10/02/2020    AST 21 10/02/2020    ALT 19 10/02/2020    ALKPHOS 82 10/02/2020    LABIL2 1.5 05/29/2015     Lab Results   Component Value Date    CKTOTAL 49 04/02/2018     Lab Results   Component Value Date    WBC 6.50 10/02/2020    HGB 13.6 10/02/2020    HCT 40.4 10/02/2020     10/02/2020     Lab Results   Component Value Date    INR 1.06 04/04/2018    INR 0.94 04/16/2017     Lab Results   Component Value Date    MG 2.3 10/02/2020     Lab Results   Component Value Date    TSH 3.780 10/02/2020    TRIG 87 04/17/2017    HDL 53 (L) 04/17/2017    LDL 83 04/17/2017      Lab Results   Component Value Date    BNP 45.0 04/01/2018           ECG 12 Lead    Date/Time: 11/19/2020 12:21 PM  Performed by: Refugio Gautam MD  Authorized by: Refugio Gautam MD   Comparison: compared with previous ECG from 2/2/2020  Comparison to previous ECG: Patient was in sinus rhythm with a left bundle branch block on the previous EKG.  On this EKG patient is in sinus rhythm with ventricular paced rhythm.  Rhythm:  sinus rhythm and paced  Conduction: 2nd degree AV block (Mobitz 2)  Other findings: non-specific ST-T wave changes  Comments: Baseline sinus rhythm with ventricular pacing and appropriate AV sequential pacing with magnet.            Assessment/Plan :   Diagnosis Plan    Second degree AV block, Mobitz type II, status post permanent pacemaker implantation with a [employer] device on 4/3/18.         Recommendations:  Periodical follow-up in pacemaker clinic.    Return in about 6 months (around 5/19/2021).    As always, Shilpa Perrin, HEENA  I appreciate very much the opportunity to participate in the cardiovascular care of your patients. Please do not hesitate to call me with any questions with regards to Tessa Fisher evaluation and management.           With Best Regards,        Refugio Gautam MD, [facility]    Please note that portions of this note were completed with a voice recognition program.

## 2021-01-11 ENCOUNTER — TELEPHONE (OUTPATIENT)
Dept: FAMILY MEDICINE CLINIC | Facility: CLINIC | Age: 81
End: 2021-01-11

## 2021-01-11 NOTE — TELEPHONE ENCOUNTER
Caller: Tessa Fisher    Relationship: Self    Best call back number: [phone]    Medication needed:   bumetanide (BUMEX) 1 MG tablet  1 mg, Daily 1 ordered         Summary: Take 1 tablet by mouth Daily., Starting Thu 11/12/2020, Normal   Dose, Route, Frequency: 1 mg, Oral, Daily        pantoprazole (PROTONIX) 40 MG EC tablet  40 mg, Daily 0 ordered         Summary: Take 1 tablet by mouth Daily., Starting Mon 2/3/2020, Print   Dose, Route, Frequency: 40 mg, Oral, Daily              When do you need the refill by: 1/25/21    What details did the patient provide when requesting the medication: PATIENT STATES THAT SHE HAS AT LEAST 2 WEEK SUPPLY.     Does the patient have less than a 3 day supply:  [] Yes  [x] No    What is the patient's preferred pharmacy:      [facility] - [place], [place] - [address] [phone] [facility] [phone] FX

## 2021-01-12 DIAGNOSIS — R60.0 BILATERAL LEG EDEMA: ICD-10-CM

## 2021-01-12 RX ORDER — BUMETANIDE 1 MG/1
1 TABLET ORAL DAILY
Qty: 90 TABLET | Refills: 1 | Status: SHIPPED | OUTPATIENT
Start: 2021-01-12 | End: 2021-08-17 | Stop reason: SDUPTHER

## 2021-01-12 RX ORDER — PANTOPRAZOLE SODIUM 40 MG/1
40 TABLET, DELAYED RELEASE ORAL DAILY
Qty: 90 TABLET | Refills: 1 | Status: SHIPPED | OUTPATIENT
Start: 2021-01-12 | End: 2021-06-02 | Stop reason: SDUPTHER

## 2021-02-01 ENCOUNTER — TREATMENT (OUTPATIENT)
Dept: CARDIOLOGY | Facility: CLINIC | Age: 81
End: 2021-02-01

## 2021-02-01 DIAGNOSIS — I49.5 SSS (SICK SINUS SYNDROME) (HCC): Primary | ICD-10-CM

## 2021-02-01 PROCEDURE — 93294 REM INTERROG EVL PM/LDLS PM: CPT | Performed by: INTERNAL MEDICINE

## 2021-02-01 PROCEDURE — 93296 REM INTERROG EVL PM/IDS: CPT | Performed by: INTERNAL MEDICINE

## 2021-02-11 DIAGNOSIS — Z23 IMMUNIZATION DUE: ICD-10-CM

## 2021-02-12 DIAGNOSIS — Z23 IMMUNIZATION DUE: ICD-10-CM

## 2021-05-03 ENCOUNTER — TREATMENT (OUTPATIENT)
Dept: CARDIOLOGY | Facility: CLINIC | Age: 81
End: 2021-05-03

## 2021-05-03 DIAGNOSIS — I49.5 SSS (SICK SINUS SYNDROME) (HCC): Primary | ICD-10-CM

## 2021-05-03 PROCEDURE — 93294 REM INTERROG EVL PM/LDLS PM: CPT | Performed by: INTERNAL MEDICINE

## 2021-05-03 PROCEDURE — 93296 REM INTERROG EVL PM/IDS: CPT | Performed by: INTERNAL MEDICINE

## 2021-05-20 ENCOUNTER — OFFICE VISIT (OUTPATIENT)
Dept: CARDIOLOGY | Facility: CLINIC | Age: 81
End: 2021-05-20

## 2021-05-20 VITALS
DIASTOLIC BLOOD PRESSURE: 77 MMHG | HEART RATE: 66 BPM | BODY MASS INDEX: 28.28 KG/M2 | WEIGHT: 176 LBS | TEMPERATURE: 97 F | SYSTOLIC BLOOD PRESSURE: 128 MMHG | OXYGEN SATURATION: 98 % | HEIGHT: 66 IN

## 2021-05-20 DIAGNOSIS — I44.1 SECOND DEGREE AV BLOCK, MOBITZ TYPE II: Primary | ICD-10-CM

## 2021-05-20 DIAGNOSIS — R60.0 BILATERAL LEG EDEMA: ICD-10-CM

## 2021-05-20 DIAGNOSIS — Z79.899 DRUG THERAPY: ICD-10-CM

## 2021-05-20 PROCEDURE — 93000 ELECTROCARDIOGRAM COMPLETE: CPT | Performed by: INTERNAL MEDICINE

## 2021-05-20 PROCEDURE — 99213 OFFICE O/P EST LOW 20 MIN: CPT | Performed by: INTERNAL MEDICINE

## 2021-05-20 NOTE — PROGRESS NOTES
Shilpa Perrin APRN  Tessa Fisher  1940        Dear Shilpa Perrin APRN:    Subjective     Tessa Fisher is a 80 y.o. female with the problems as listed above, presents    Chief complaint: Follow-up of underwent pacemaker implantation for high-grade AV block.    History of Present Illness: Ms. Fisher is a pleasant 80-year-old  female with history of high-grade second-degree AV block for which she has had permanent dual-chamber pacemaker implanted in 2018.  She is here for regular cardiology follow-up.  On today's visit she denies any complaints of palpitations, dizziness or syncope or shortness of breath.  She is mainly concerned about her feet swelling.  She denies any complaints of dyspnea, PND, orthopnea.    Tessa Fisher  Echo Complete w/Doppler and Color Flow  Order# [id number]  Reading physician: Refugio Gautam MD Ordering physician: Refugio Gautam MD Study date: 20   Patient Information    Patient Name   Tessa Fisher MRN   [id number] Legal Sex   Female  (Age)   1940 (80 y.o.)   Interpretation Summary    · Normal left ventricular cavity size and wall thickness noted. All left ventricular wall segments contract normally.  · Estimated EF appears to be in the range of 66 - 70%  · Left ventricular diastolic dysfunction (grade I) consistent with impaired relaxation.  · The aortic valve is structurally normal. Trace aortic valve regurgitation is present. No aortic valve stenosis is present.  · The mitral valve is normal in structure. Mild mitral valve regurgitation is present. No significant mitral valve stenosis is present.  · The tricuspid valve is normal. No tricuspid valve regurgitation is present  · There is no evidence of pericardial effusion.       No Known Allergies:      Current Outpatient Medications:   •  bumetanide (BUMEX) 1 MG tablet, Take 1 tablet by mouth Daily., Disp: 90 tablet, Rfl: 1  •  pantoprazole  "(PROTONIX) 40 MG EC tablet, Take 1 tablet by mouth Daily., Disp: 90 tablet, Rfl: 1      The following portions of the patient's history were reviewed and updated as appropriate: allergies, current medications, past family history, past medical history, past social history, past surgical history and problem list.    Social History     Tobacco Use   • Smoking status: Former Smoker     Packs/day: 3.00     Years: 25.00     Pack years: 75.00     Types: Cigarettes     Quit date:      Years since quittin.4   • Smokeless tobacco: Never Used   Substance Use Topics   • Alcohol use: No   • Drug use: No       Review of Systems   Constitutional: Negative for chills, fever, malaise/fatigue, weight gain and weight loss.   HENT: Negative for congestion and nosebleeds.    Cardiovascular: Negative for chest pain, irregular heartbeat, leg swelling and orthopnea.   Respiratory: Negative for cough, hemoptysis and shortness of breath.    Gastrointestinal: Negative for abdominal pain, change in bowel habit, hematemesis, melena and vomiting.   Genitourinary: Negative for dysuria, frequency and hematuria.   Neurological: Negative for focal weakness, headaches, light-headedness and weakness.       Objective   Vitals:    21 1354   BP: 128/77   BP Location: Right arm   Patient Position: Sitting   Cuff Size: Adult   Pulse: 66   Temp: 97 °F (36.1 °C)   TempSrc: Infrared   SpO2: 98%   Weight: 79.8 kg (176 lb)   Height: 167.6 cm (66\")     Body mass index is 28.41 kg/m².        Vitals reviewed.   Constitutional:       Appearance: Well-developed.   Eyes:      Conjunctiva/sclera: Conjunctivae normal.   HENT:      Head: Normocephalic.   Neck:      Thyroid: No thyromegaly.      Vascular: No JVD.      Trachea: No tracheal deviation.   Pulmonary:      Effort: No respiratory distress.      Breath sounds: Normal breath sounds. No wheezing. No rales.   Cardiovascular:      PMI at left midclavicular line. Normal rate. Regular rhythm. Normal " S1. Normal S2.      Murmurs: There is no murmur.      No gallop. No click. No rub.   Pulses:     Intact distal pulses.   Edema:     Thigh: bilateral 2+ edema of the thigh.     Pretibial: bilateral 2+ edema of the pretibial area.     Ankle: bilateral 2+ edema of the ankle.     Feet: bilateral 2+ edema of the feet.  Abdominal:      General: Bowel sounds are normal.      Palpations: Abdomen is soft. There is no abdominal mass.      Tenderness: There is no abdominal tenderness.   Musculoskeletal:      Cervical back: Normal range of motion and neck supple. Skin:     General: Skin is warm and dry.   Neurological:      Mental Status: Alert and oriented to person, place, and time.      Cranial Nerves: No cranial nerve deficit.         Lab Results   Component Value Date     (L) 10/02/2020    K 5.1 10/02/2020     10/02/2020    CO2 26.2 10/02/2020    BUN 12 10/02/2020    CREATININE 0.71 10/02/2020    GLUCOSE 92 10/02/2020    CALCIUM 9.9 10/02/2020    AST 21 10/02/2020    ALT 19 10/02/2020    ALKPHOS 82 10/02/2020    LABIL2 1.5 05/29/2015     Lab Results   Component Value Date    CKTOTAL 49 04/02/2018     Lab Results   Component Value Date    WBC 6.50 10/02/2020    HGB 13.6 10/02/2020    HCT 40.4 10/02/2020     10/02/2020     Lab Results   Component Value Date    INR 1.06 04/04/2018    INR 0.94 04/16/2017     Lab Results   Component Value Date    MG 2.3 10/02/2020     Lab Results   Component Value Date    TSH 3.780 10/02/2020    TRIG 87 04/17/2017    HDL 53 (L) 04/17/2017    LDL 83 04/17/2017      Lab Results   Component Value Date    BNP 45.0 04/01/2018         ECG 12 Lead    Date/Time: 5/20/2021 1:43 PM  Performed by: Refugio Gautam MD  Authorized by: Refugio Gautam MD   Comparison: compared with previous ECG from 1/19/2020  Similar to previous ECG  Comments: 100% AV sequential pacing            Assessment/Plan    Diagnosis Plan   1. Second degree AV block, Mobitz type II, status post permanent  pacemaker implantation with a [employer] device on 4/3/18.     2. Bilateral leg edema  Stocking JOSHUA Knee Long LG LF       Recommendations:  1. Ihave asked her to keep her legs propped up and may try to use JOSHUA hose elastic stockings to see if this will help with her edema.  2. Use Bumex as needed for a few days at a time.  3. Cut back on salt consumption and avoid canned vegetables, canned soups, all chips, pickles, processed meats etc.    Return in about 5 months (around 10/20/2021).    As always, Shilpa Perrin, HEENA  I appreciate very much the opportunity to participate in the cardiovascular care of your patients. Please do not hesitate to call me with any questions with regards to Tessa Fisher's evaluation and management.       With Best Regards,        Refugio Gautam MD, [facility]    Please note that portions of this note were completed with a voice recognition program.

## 2021-06-02 RX ORDER — PANTOPRAZOLE SODIUM 40 MG/1
40 TABLET, DELAYED RELEASE ORAL DAILY
Qty: 90 TABLET | Refills: 1 | Status: SHIPPED | OUTPATIENT
Start: 2021-06-02 | End: 2021-08-17 | Stop reason: SDUPTHER

## 2021-06-02 NOTE — TELEPHONE ENCOUNTER
Caller: Kamila Alba    Relationship: Emergency Contact    Best call back number: [phone]    Medication needed:   Requested Prescriptions     Pending Prescriptions Disp Refills   • pantoprazole (PROTONIX) 40 MG EC tablet 90 tablet 1     Sig: Take 1 tablet by mouth Daily.       When do you need the refill by: 06/15/2021    What additional details did the patient provide when requesting the medication: PATIENT IS OFFBOARDING FROM  AND HAS A NEW PATIENT APPOINTMENT WITH [facility] ON 08/17/2021 BUT WILL NEED HER MEDICATION PRIOR TO THE VISIT    SHE DID GET AN EMERGENCY 30 DAY SUPPLY FROM THE PHARMACY ON ABOUT 05/28/2021     Does the patient have less than a 3 day supply:  [] Yes  [x] No    What is the patient's preferred pharmacy: [facility] [place], [place] - [address] - [phone] [facility] [phone] FX

## 2021-06-14 ENCOUNTER — TELEPHONE (OUTPATIENT)
Dept: FAMILY MEDICINE CLINIC | Facility: CLINIC | Age: 81
End: 2021-06-14

## 2021-06-14 NOTE — TELEPHONE ENCOUNTER
Provider: LINDSEY AVILA (WILL BE SEEING [facility])  Caller: RYLAN KHAN  Relationship to Patient: PRATIBHA  Phone Number: [phone]  Reason for Call: PATIENT'S DAUGHTER CALLED AND STATED THAT THERE PROTIONIC WORKS BETTER FOR HER MOTHER'S STOMACH

## 2021-08-02 ENCOUNTER — TREATMENT (OUTPATIENT)
Dept: CARDIOLOGY | Facility: CLINIC | Age: 81
End: 2021-08-02

## 2021-08-02 DIAGNOSIS — I49.5 SSS (SICK SINUS SYNDROME) (HCC): Primary | ICD-10-CM

## 2021-08-02 PROCEDURE — 93296 REM INTERROG EVL PM/IDS: CPT | Performed by: INTERNAL MEDICINE

## 2021-08-02 PROCEDURE — 93294 REM INTERROG EVL PM/LDLS PM: CPT | Performed by: INTERNAL MEDICINE

## 2021-08-17 ENCOUNTER — OFFICE VISIT (OUTPATIENT)
Dept: FAMILY MEDICINE CLINIC | Facility: CLINIC | Age: 81
End: 2021-08-17

## 2021-08-17 VITALS
DIASTOLIC BLOOD PRESSURE: 74 MMHG | WEIGHT: 172 LBS | SYSTOLIC BLOOD PRESSURE: 126 MMHG | HEIGHT: 66 IN | BODY MASS INDEX: 27.64 KG/M2 | TEMPERATURE: 96.9 F | OXYGEN SATURATION: 98 % | HEART RATE: 83 BPM

## 2021-08-17 DIAGNOSIS — E55.9 VITAMIN D DEFICIENCY: ICD-10-CM

## 2021-08-17 DIAGNOSIS — Z11.59 ENCOUNTER FOR HEPATITIS C SCREENING TEST FOR LOW RISK PATIENT: ICD-10-CM

## 2021-08-17 DIAGNOSIS — R60.0 BILATERAL LEG EDEMA: Primary | ICD-10-CM

## 2021-08-17 PROCEDURE — 36415 COLL VENOUS BLD VENIPUNCTURE: CPT | Performed by: FAMILY MEDICINE

## 2021-08-17 PROCEDURE — 83735 ASSAY OF MAGNESIUM: CPT | Performed by: FAMILY MEDICINE

## 2021-08-17 PROCEDURE — 99213 OFFICE O/P EST LOW 20 MIN: CPT | Performed by: FAMILY MEDICINE

## 2021-08-17 PROCEDURE — 87522 HEPATITIS C REVRS TRNSCRPJ: CPT | Performed by: FAMILY MEDICINE

## 2021-08-17 PROCEDURE — 85025 COMPLETE CBC W/AUTO DIFF WBC: CPT | Performed by: FAMILY MEDICINE

## 2021-08-17 PROCEDURE — 80053 COMPREHEN METABOLIC PANEL: CPT | Performed by: FAMILY MEDICINE

## 2021-08-17 PROCEDURE — 84439 ASSAY OF FREE THYROXINE: CPT | Performed by: FAMILY MEDICINE

## 2021-08-17 PROCEDURE — 82306 VITAMIN D 25 HYDROXY: CPT | Performed by: FAMILY MEDICINE

## 2021-08-17 PROCEDURE — 84443 ASSAY THYROID STIM HORMONE: CPT | Performed by: FAMILY MEDICINE

## 2021-08-17 RX ORDER — BUMETANIDE 1 MG/1
1 TABLET ORAL DAILY
Qty: 90 TABLET | Refills: 1 | Status: SHIPPED | OUTPATIENT
Start: 2021-08-17 | End: 2021-10-20

## 2021-08-17 RX ORDER — PANTOPRAZOLE SODIUM 40 MG/1
40 TABLET, DELAYED RELEASE ORAL DAILY
Qty: 90 TABLET | Refills: 1 | Status: SHIPPED | OUTPATIENT
Start: 2021-08-17 | End: 2022-04-14 | Stop reason: SDUPTHER

## 2021-08-17 NOTE — PROGRESS NOTES
Subjective   Tessa Fisher is a 80 y.o. female.     Chief Complaint: Medicare Wellness-subsequent    History of Present Illness   Leg Swelling   This is a chronic problem. The current episode started more than 1 year ago. The problem occurs daily. Associated symptoms include arthralgias. Associated symptoms comments: Swelling resolves with lying down; does not wish to take any medication. The symptoms are aggravated by standing and walking. She has tried lying down and rest for the symptoms. The treatment provided significant relief.  Swelling does mostly resolve after lying down at night.  She does complain in bilateral lower extremities throughout the day and sometimes at night when she moves her legs. 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   She has tried lasix that did not help.  She has discussed with Dr Gautam, cardiologist, and also been evaluated by Dr Aviles, cardiolgist, without any relief of symptoms.   Currently taking bumex that is working well.   Please let her know.   Family History   Problem Relation Age of Onset   • Cancer Mother         Basal cell cancer of the skin    • Cancer Father    • Cancer Brother         Basal cell cancer of the skin   • Diabetes Neg Hx    • Heart disease Neg Hx    • Hypertension Neg Hx        Social History     Socioeconomic History   • Marital status:      Spouse name: Not on file   • Number of children: Not on file   • Years of education: Not on file   • Highest education level: Not on file   Tobacco Use   • Smoking status: Former Smoker     Packs/day: 3.00     Years: 25.00     Pack years: 75.00     Types: Cigarettes     Quit date:      Years since quittin.6   • Smokeless tobacco: Never Used   Substance and Sexual Activity   • Alcohol use: No   • Drug use: No   • Sexual activity: Defer       Past Medical History:   Diagnosis Date   • Basal cell carcinoma 2016    Left groin   • Diastolic congestive heart failure (CMS/HCC)    •  "GERD (gastroesophageal reflux disease)    • Hiatal hernia    • LBBB (left bundle branch block)    • Pacemaker    • Popliteal cyst, right        Review of Systems   Constitutional: Negative.    HENT: Negative.    Respiratory: Negative.    Cardiovascular: Positive for leg swelling.   Gastrointestinal: Negative.    Musculoskeletal: Negative.    Skin: Negative.    Neurological: Negative.    Psychiatric/Behavioral: Negative.        Objective   Physical Exam  Vitals and nursing note reviewed.   Constitutional:       Appearance: She is well-developed.   Cardiovascular:      Rate and Rhythm: Normal rate and regular rhythm.      Heart sounds: Normal heart sounds.   Pulmonary:      Effort: Pulmonary effort is normal.      Breath sounds: Normal breath sounds.   Musculoskeletal:      Cervical back: Normal range of motion and neck supple.      Comments: Bilateral lower extremity edema; JOSHUA hose, edema seems to have improved   Skin:     General: Skin is warm and dry.   Neurological:      Mental Status: She is alert and oriented to person, place, and time.   Psychiatric:         Behavior: Behavior normal.         Thought Content: Thought content normal.         Judgment: Judgment normal.         Procedures    Vitals: Blood pressure 126/74, pulse 83, temperature 96.9 °F (36.1 °C), height 167.6 cm (66\"), weight 78 kg (172 lb), SpO2 98 %, not currently breastfeeding.    Body mass index is 27.76 kg/m².     Allergies: No Known Allergies     During this visit the following were done:  Labs Reviewed []    Labs Ordered []    Radiology Reports Reviewed []    Radiology Ordered []    PCP Records Reviewed []    Referring Provider Records Reviewed []    ER Records Reviewed []    Hospital Records Reviewed []    History Obtained From Family []    Radiology Images Reviewed []    Other Reviewed []    Records Requested []      Assessment/Plan   Diagnoses and all orders for this visit:    1. Bilateral leg edema (Primary)  -     bumetanide (BUMEX) 1 " MG tablet; Take 1 tablet by mouth Daily.  Dispense: 90 tablet; Refill: 1  -     pantoprazole (PROTONIX) 40 MG EC tablet; Take 1 tablet by mouth Daily.  Dispense: 90 tablet; Refill: 1  -     Comprehensive Metabolic Panel; Future  -     Magnesium; Future  -     TSH; Future  -     T4, Free; Future  -     CBC Auto Differential; Future  -     Comprehensive Metabolic Panel  -     Magnesium  -     TSH  -     T4, Free  -     CBC Auto Differential    2. Vitamin D deficiency  -     Vitamin D 25 Hydroxy; Future  -     Vitamin D 25 Hydroxy    3. Encounter for hepatitis C screening test for low risk patient  -     Hepatitis C RNA, Quantitative, PCR (graph); Future  -     Hepatitis C RNA, Quantitative, PCR (graph)               Refuses dexa at this time.

## 2021-08-18 ENCOUNTER — TELEPHONE (OUTPATIENT)
Dept: FAMILY MEDICINE CLINIC | Facility: CLINIC | Age: 81
End: 2021-08-18

## 2021-08-18 LAB
25(OH)D3 SERPL-MCNC: 30.2 NG/ML (ref 30–100)
ALBUMIN SERPL-MCNC: 4.3 G/DL (ref 3.5–5.2)
ALBUMIN/GLOB SERPL: 1.3 G/DL
ALP SERPL-CCNC: 79 U/L (ref 39–117)
ALT SERPL W P-5'-P-CCNC: 18 U/L (ref 1–33)
ANION GAP SERPL CALCULATED.3IONS-SCNC: 12.5 MMOL/L (ref 5–15)
AST SERPL-CCNC: 23 U/L (ref 1–32)
BASOPHILS # BLD AUTO: 0.07 10*3/MM3 (ref 0–0.2)
BASOPHILS NFR BLD AUTO: 1 % (ref 0–1.5)
BILIRUB SERPL-MCNC: 0.4 MG/DL (ref 0–1.2)
BUN SERPL-MCNC: 10 MG/DL (ref 8–23)
BUN/CREAT SERPL: 13.2 (ref 7–25)
CALCIUM SPEC-SCNC: 10 MG/DL (ref 8.6–10.5)
CHLORIDE SERPL-SCNC: 99 MMOL/L (ref 98–107)
CO2 SERPL-SCNC: 21.5 MMOL/L (ref 22–29)
CREAT SERPL-MCNC: 0.76 MG/DL (ref 0.57–1)
DEPRECATED RDW RBC AUTO: 43.6 FL (ref 37–54)
EOSINOPHIL # BLD AUTO: 0.14 10*3/MM3 (ref 0–0.4)
EOSINOPHIL NFR BLD AUTO: 1.9 % (ref 0.3–6.2)
ERYTHROCYTE [DISTWIDTH] IN BLOOD BY AUTOMATED COUNT: 13.2 % (ref 12.3–15.4)
GFR SERPL CREATININE-BSD FRML MDRD: 73 ML/MIN/1.73
GLOBULIN UR ELPH-MCNC: 3.2 GM/DL
GLUCOSE SERPL-MCNC: 73 MG/DL (ref 65–99)
HCT VFR BLD AUTO: 41.4 % (ref 34–46.6)
HGB BLD-MCNC: 13.9 G/DL (ref 12–15.9)
IMM GRANULOCYTES # BLD AUTO: 0.02 10*3/MM3 (ref 0–0.05)
IMM GRANULOCYTES NFR BLD AUTO: 0.3 % (ref 0–0.5)
LYMPHOCYTES # BLD AUTO: 2.38 10*3/MM3 (ref 0.7–3.1)
LYMPHOCYTES NFR BLD AUTO: 33 % (ref 19.6–45.3)
MAGNESIUM SERPL-MCNC: 2 MG/DL (ref 1.6–2.4)
MCH RBC QN AUTO: 30.2 PG (ref 26.6–33)
MCHC RBC AUTO-ENTMCNC: 33.6 G/DL (ref 31.5–35.7)
MCV RBC AUTO: 89.8 FL (ref 79–97)
MONOCYTES # BLD AUTO: 0.57 10*3/MM3 (ref 0.1–0.9)
MONOCYTES NFR BLD AUTO: 7.9 % (ref 5–12)
NEUTROPHILS NFR BLD AUTO: 4.03 10*3/MM3 (ref 1.7–7)
NEUTROPHILS NFR BLD AUTO: 55.9 % (ref 42.7–76)
NRBC BLD AUTO-RTO: 0 /100 WBC (ref 0–0.2)
PLATELET # BLD AUTO: 259 10*3/MM3 (ref 140–450)
PMV BLD AUTO: 10.5 FL (ref 6–12)
POTASSIUM SERPL-SCNC: 4.8 MMOL/L (ref 3.5–5.2)
PROT SERPL-MCNC: 7.5 G/DL (ref 6–8.5)
RBC # BLD AUTO: 4.61 10*6/MM3 (ref 3.77–5.28)
SODIUM SERPL-SCNC: 133 MMOL/L (ref 136–145)
T4 FREE SERPL-MCNC: 1.09 NG/DL (ref 0.93–1.7)
TSH SERPL DL<=0.05 MIU/L-ACNC: 3.67 UIU/ML (ref 0.27–4.2)
WBC # BLD AUTO: 7.21 10*3/MM3 (ref 3.4–10.8)

## 2021-08-18 NOTE — TELEPHONE ENCOUNTER
----- Message from HEENA Vasquez sent at 8/18/2021  8:24 AM EDT -----  Please let patient know results are normal. Thank you.         Left a message to return call.    Patient notified.

## 2021-08-19 LAB
HCV RNA SERPL NAA+PROBE-ACNC: NORMAL IU/ML
TEST INFORMATION: NORMAL

## 2021-10-04 ENCOUNTER — TELEPHONE (OUTPATIENT)
Dept: CARDIOLOGY | Facility: CLINIC | Age: 81
End: 2021-10-04

## 2021-10-20 ENCOUNTER — OFFICE VISIT (OUTPATIENT)
Dept: CARDIOLOGY | Facility: CLINIC | Age: 81
End: 2021-10-20

## 2021-10-20 VITALS
SYSTOLIC BLOOD PRESSURE: 145 MMHG | TEMPERATURE: 97.1 F | HEART RATE: 62 BPM | BODY MASS INDEX: 27.58 KG/M2 | DIASTOLIC BLOOD PRESSURE: 73 MMHG | HEIGHT: 66 IN | WEIGHT: 171.6 LBS | RESPIRATION RATE: 16 BRPM

## 2021-10-20 DIAGNOSIS — I44.1 SECOND DEGREE AV BLOCK, MOBITZ TYPE II: Primary | ICD-10-CM

## 2021-10-20 PROCEDURE — 99213 OFFICE O/P EST LOW 20 MIN: CPT | Performed by: INTERNAL MEDICINE

## 2021-10-20 NOTE — PROGRESS NOTES
Verna Moran APRN  Tessa Fisher  1940  10/20/2021    Patient Active Problem List   Diagnosis   • Gastroesophageal reflux disease without esophagitis   • Right leg pain   • Generalized weakness   • Near syncope   • Screening   • Syncope   • Symptomatic bradycardia   • Former smoker   • History of pneumothorax   • Pacemaker   • Second degree AV block, Mobitz type II, status post permanent pacemaker implantation with a [employer] device on 4/3/18.   • Precordial pain   • Bilateral leg edema       Dear Verna Moran APRN:    Subjective     Tessa Fisher is a 80 y.o. female with the problems as listed above, presents    Chief complaint: Follow-up of high-grade AV block, status post permanent pacemaker implantation    History of Present Illness: Ms. Fisher is a pleasant 80-year-old black female with history of high-grade second-degree AV block for which she has had permanent dual-chamber pacemaker implanted in 2018.  She is here for regular cardiology follow-up.  On today's visit she denies any complaints of palpitations, dizziness or syncope and overall has been doing well.  Her recent pacemaker check in 2021 was normal.    No Known Allergies:      Current Outpatient Medications:   •  pantoprazole (PROTONIX) 40 MG EC tablet, Take 1 tablet by mouth Daily., Disp: 90 tablet, Rfl: 1      The following portions of the patient's history were reviewed and updated as appropriate: allergies, current medications, past family history, past medical history, past social history, past surgical history and problem list.    Social History     Tobacco Use   • Smoking status: Former Smoker     Packs/day: 3.00     Years: 25.00     Pack years: 75.00     Types: Cigarettes     Quit date:      Years since quittin.8   • Smokeless tobacco: Never Used   Substance Use Topics   • Alcohol use: No   • Drug use: No       Review of Systems   Constitutional: Negative for chills and fever.  "  HENT: Negative for nosebleeds and sore throat.    Cardiovascular: Positive for leg swelling. Negative for chest pain and palpitations.   Respiratory: Negative for cough, hemoptysis, shortness of breath and wheezing.    Gastrointestinal: Negative for abdominal pain, hematemesis, hematochezia, melena, nausea and vomiting.   Genitourinary: Negative for dysuria and hematuria.   Neurological: Negative for headaches.       Objective   Vitals:    10/20/21 1404   BP: 145/73   Pulse: 62   Resp: 16   Temp: 97.1 °F (36.2 °C)   Weight: 77.8 kg (171 lb 9.6 oz)   Height: 167.6 cm (66\")     Body mass index is 27.7 kg/m².    Vitals reviewed.   Constitutional:       Appearance: Well-developed.   Eyes:      Conjunctiva/sclera: Conjunctivae normal.   HENT:      Head: Normocephalic.   Neck:      Thyroid: No thyromegaly.      Vascular: No JVD.      Trachea: No tracheal deviation.   Pulmonary:      Effort: No respiratory distress.      Breath sounds: Normal breath sounds. No wheezing. No rales.   Cardiovascular:      PMI at left midclavicular line. Normal rate. Regular rhythm. Normal S1. Normal S2.      Murmurs: There is no murmur.      No gallop. No click. No rub.   Pulses:     Intact distal pulses.   Edema:     Peripheral edema absent.   Abdominal:      General: Bowel sounds are normal.      Palpations: Abdomen is soft. There is no abdominal mass.      Tenderness: There is no abdominal tenderness.   Musculoskeletal:      Cervical back: Normal range of motion and neck supple. Skin:     General: Skin is warm and dry.   Neurological:      Mental Status: Alert and oriented to person, place, and time.      Cranial Nerves: No cranial nerve deficit.         Lab Results   Component Value Date     (L) 08/17/2021    K 4.8 08/17/2021    CL 99 08/17/2021    CO2 21.5 (L) 08/17/2021    BUN 10 08/17/2021    CREATININE 0.76 08/17/2021    GLUCOSE 73 08/17/2021    CALCIUM 10.0 08/17/2021    AST 23 08/17/2021    ALT 18 08/17/2021    ALKPHOS 79 " 08/17/2021    LABIL2 1.5 05/29/2015     Lab Results   Component Value Date    CKTOTAL 49 04/02/2018     Lab Results   Component Value Date    WBC 7.21 08/17/2021    HGB 13.9 08/17/2021    HCT 41.4 08/17/2021     08/17/2021     Lab Results   Component Value Date    INR 1.06 04/04/2018    INR 0.94 04/16/2017     Lab Results   Component Value Date    MG 2.0 08/17/2021     Lab Results   Component Value Date    TSH 3.670 08/17/2021    TRIG 87 04/17/2017    HDL 53 (L) 04/17/2017    LDL 83 04/17/2017      Lab Results   Component Value Date    BNP 45.0 04/01/2018       Assessment/Plan :   Diagnosis Plan    Second degree AV block, Mobitz type II, status post permanent pacemaker implantation with a [employer] device on 4/3/18, seems to functioning well..         Recommendations:  Continue with periodical pacemaker checks.    Return in about 7 months (around 5/20/2022).    As always, Verna Moran APRN  I appreciate very much the opportunity to participate in the cardiovascular care of your patients. Please do not hesitate to call me with any questions with regards to Tessa Lakhaniparesh evaluation and management.           With Best Regards,        Refugio Gautam MD, [facility]    Please note that portions of this note were completed with a voice recognition program.

## 2021-10-27 ENCOUNTER — CLINICAL SUPPORT (OUTPATIENT)
Dept: CARDIOLOGY | Facility: CLINIC | Age: 81
End: 2021-10-27

## 2021-10-27 DIAGNOSIS — I49.5 SSS (SICK SINUS SYNDROME) (HCC): Primary | ICD-10-CM

## 2021-10-27 PROCEDURE — 93288 INTERROG EVL PM/LDLS PM IP: CPT | Performed by: INTERNAL MEDICINE

## 2021-11-01 ENCOUNTER — TREATMENT (OUTPATIENT)
Dept: CARDIOLOGY | Facility: CLINIC | Age: 81
End: 2021-11-01

## 2021-11-01 DIAGNOSIS — I49.5 SSS (SICK SINUS SYNDROME) (HCC): Primary | ICD-10-CM

## 2021-11-01 PROCEDURE — 93296 REM INTERROG EVL PM/IDS: CPT | Performed by: INTERNAL MEDICINE

## 2021-11-01 PROCEDURE — 93294 REM INTERROG EVL PM/LDLS PM: CPT | Performed by: INTERNAL MEDICINE

## 2022-01-31 ENCOUNTER — TREATMENT (OUTPATIENT)
Dept: CARDIOLOGY | Facility: CLINIC | Age: 82
End: 2022-01-31

## 2022-01-31 DIAGNOSIS — I49.5 SSS (SICK SINUS SYNDROME): Primary | ICD-10-CM

## 2022-01-31 PROCEDURE — 93294 REM INTERROG EVL PM/LDLS PM: CPT | Performed by: INTERNAL MEDICINE

## 2022-02-07 PROCEDURE — 93296 REM INTERROG EVL PM/IDS: CPT | Performed by: INTERNAL MEDICINE

## 2022-02-24 ENCOUNTER — TELEPHONE (OUTPATIENT)
Dept: FAMILY MEDICINE CLINIC | Facility: CLINIC | Age: 82
End: 2022-02-24

## 2022-04-14 DIAGNOSIS — R60.0 BILATERAL LEG EDEMA: ICD-10-CM

## 2022-04-14 RX ORDER — PANTOPRAZOLE SODIUM 40 MG/1
40 TABLET, DELAYED RELEASE ORAL DAILY
Qty: 90 TABLET | Refills: 1 | Status: SHIPPED | OUTPATIENT
Start: 2022-04-14 | End: 2022-04-18 | Stop reason: SDUPTHER

## 2022-04-14 NOTE — TELEPHONE ENCOUNTER
Caller: ParthKamila    Relationship: Emergency Contact    Best call back number: [phone]     Requested Prescriptions:   Requested Prescriptions     Pending Prescriptions Disp Refills   • pantoprazole (PROTONIX) 40 MG EC tablet 90 tablet 1     Sig: Take 1 tablet by mouth Daily.        Pharmacy where request should be sent: [facility] [address] - [phone]  - [phone]     Additional details provided by patient: PATIENT HAS CALLED REQUESTING 90 DAY PRESCRIPTION REFILL ON ABOVE MEDICATION    Does the patient have less than a 3 day supply:  [x] Yes  [] No    Macey Purvis   04/14/22 14:05 EDT

## 2022-04-15 ENCOUNTER — TELEPHONE (OUTPATIENT)
Dept: FAMILY MEDICINE CLINIC | Facility: CLINIC | Age: 82
End: 2022-04-15

## 2022-04-15 NOTE — TELEPHONE ENCOUNTER
Caller: Kamila Alba    Relationship: Emergency Contact    Best call back number: [phone]    What is the best time to reach you: anytime    Who are you requesting to speak with (clinical staff, provider,  specific staff member): Dr. Moran    Do you know the name of the person who called:     What was the call regarding: patient's daughter is calling and stating that the patient takes pantoprazole (PROTONIX) 40 MG EC tablet and she is supposed to take it twice a day and the prescription only shows once a day    Do you require a callback: YES

## 2022-04-18 DIAGNOSIS — R60.0 BILATERAL LEG EDEMA: ICD-10-CM

## 2022-04-18 RX ORDER — PANTOPRAZOLE SODIUM 40 MG/1
40 TABLET, DELAYED RELEASE ORAL 2 TIMES DAILY
Qty: 60 TABLET | Refills: 2 | Status: SHIPPED | OUTPATIENT
Start: 2022-04-18 | End: 2022-08-18 | Stop reason: SDUPTHER

## 2022-04-20 ENCOUNTER — CLINICAL SUPPORT (OUTPATIENT)
Dept: CARDIOLOGY | Facility: CLINIC | Age: 82
End: 2022-04-20

## 2022-04-20 DIAGNOSIS — I49.5 SSS (SICK SINUS SYNDROME): Primary | ICD-10-CM

## 2022-04-20 PROCEDURE — 93288 INTERROG EVL PM/LDLS PM IP: CPT | Performed by: INTERNAL MEDICINE

## 2022-05-24 ENCOUNTER — OFFICE VISIT (OUTPATIENT)
Dept: CARDIOLOGY | Facility: CLINIC | Age: 82
End: 2022-05-24

## 2022-05-24 VITALS
HEART RATE: 68 BPM | DIASTOLIC BLOOD PRESSURE: 79 MMHG | OXYGEN SATURATION: 96 % | TEMPERATURE: 97.1 F | HEIGHT: 66 IN | SYSTOLIC BLOOD PRESSURE: 135 MMHG | BODY MASS INDEX: 27.48 KG/M2 | WEIGHT: 171 LBS

## 2022-05-24 DIAGNOSIS — M79.605 BILATERAL LEG PAIN: ICD-10-CM

## 2022-05-24 DIAGNOSIS — I44.1 SECOND DEGREE AV BLOCK, MOBITZ TYPE II: Primary | ICD-10-CM

## 2022-05-24 DIAGNOSIS — R60.0 BILATERAL LEG EDEMA: ICD-10-CM

## 2022-05-24 DIAGNOSIS — M79.604 BILATERAL LEG PAIN: ICD-10-CM

## 2022-05-24 PROCEDURE — 99213 OFFICE O/P EST LOW 20 MIN: CPT | Performed by: NURSE PRACTITIONER

## 2022-05-24 PROCEDURE — 93000 ELECTROCARDIOGRAM COMPLETE: CPT | Performed by: NURSE PRACTITIONER

## 2022-05-24 NOTE — PROGRESS NOTES
Verna Moran APRN  Tessa Fisher  1940 05/24/2022    Patient Active Problem List   Diagnosis   • Gastroesophageal reflux disease without esophagitis   • Bilateral leg pain   • Generalized weakness   • Near syncope   • Screening   • Syncope   • Symptomatic bradycardia   • Former smoker   • History of pneumothorax   • Pacemaker   • Second degree AV block, Mobitz type II, status post permanent pacemaker implantation with a [employer] device on 4/3/18.   • Precordial pain   • Bilateral leg edema       Dear Verna Moran APRN:    Subjective     Chief Complaint   Patient presents with   • Follow-up     7 month   • Med Management     verbal     History of Present Illness:    Tessa Fisher is a 81 y.o. female with a history of second-degree AV block type II status post permanent dual-chamber pacemaker implantation with a [employer] device on 4/3/2018.  Patient presents today for routine cardiology follow-up.  She states since her last visit she has been doing overall well.  Denies any chest pain, shortness of breath, dizziness or syncope.  Does report chronic lower extremity edema with pain when she walks.  She reportedly has used JOSHUA hose elastic stockings before however they make her edema worse.  She was seen by Dr. Aviles 2 years ago in the outpatient setting when she ordered a venous reflux study which showed a normal study.  Patient denies ever having arterial Doppler.  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 Patient had pacemaker interrogation on 4/20/2022 which showed no events with 5-year battery life.      No Known Allergies:      Current Outpatient Medications:   •  pantoprazole (PROTONIX) 40 MG EC tablet, Take 1 tablet by mouth 2 (Two) Times a Day., Disp: 60 tablet, Rfl: 2      The following portions of the patient's history were reviewed today and updated today as appropriate: allergies, current medications, past family  "history, past medical history, past social history, past surgical history and problem list.    Social History     Tobacco Use   • Smoking status: Former Smoker     Packs/day: 3.00     Years: 25.00     Pack years: 75.00     Types: Cigarettes     Quit date:      Years since quittin.4   • Smokeless tobacco: Never Used   Substance Use Topics   • Alcohol use: No   • Drug use: No       Objective   Vitals:    22 1249   BP: 135/79   BP Location: Left arm   Patient Position: Sitting   Cuff Size: Adult   Pulse: 68   Temp: 97.1 °F (36.2 °C)   TempSrc: Temporal   SpO2: 96%   Weight: 77.6 kg (171 lb)   Height: 167.6 cm (66\")     Body mass index is 27.6 kg/m².    Constitutional:       Appearance: Healthy appearance. Well-developed.   Eyes:      Pupils: Pupils are equal, round, and reactive to light.   HENT:      Head: Normocephalic.   Neck:      Lymphadenopathy: No cervical adenopathy.   Pulmonary:      Effort: Pulmonary effort is normal. No respiratory distress.      Breath sounds: Normal breath sounds. No wheezing.   Cardiovascular:      Normal rate. Regular rhythm. Normal S1. Normal S2.      . No S3 and S4 gallop.   Pulses:     Intact distal pulses.   Abdominal:      General: Bowel sounds are normal. There is no distension.      Palpations: Abdomen is soft.      Tenderness: There is no abdominal tenderness.   Musculoskeletal:      Cervical back: Normal range of motion. Skin:     General: Skin is warm and dry.      Findings: No erythema.   Neurological:      Mental Status: Alert, oriented to person, place, and time and oriented to person, place and time.   Psychiatric:         Attention and Perception: Attention normal.         Mood and Affect: Mood normal.         Behavior: Behavior normal.         Thought Content: Thought content normal.         Lab Results   Component Value Date     (L) 2021    K 4.8 2021    CL 99 2021    CO2 21.5 (L) 2021    BUN 10 2021    CREATININE 0.76 " 08/17/2021    GLUCOSE 73 08/17/2021    CALCIUM 10.0 08/17/2021    AST 23 08/17/2021    ALT 18 08/17/2021    ALKPHOS 79 08/17/2021    LABIL2 1.5 05/29/2015     Lab Results   Component Value Date    CKTOTAL 49 04/02/2018     Lab Results   Component Value Date    WBC 7.21 08/17/2021    HGB 13.9 08/17/2021    HCT 41.4 08/17/2021     08/17/2021     Lab Results   Component Value Date    INR 1.06 04/04/2018    INR 0.94 04/16/2017     Lab Results   Component Value Date    MG 2.0 08/17/2021     Lab Results   Component Value Date    TSH 3.670 08/17/2021    TRIG 87 04/17/2017    HDL 53 (L) 04/17/2017    LDL 83 04/17/2017      Lab Results   Component Value Date    BNP 45.0 04/01/2018         ECG 12 Lead    Date/Time: 5/24/2022 1:22 PM  Performed by: Michelle Doty APRN  Authorized by: Michelle Doty APRN   Comparison: compared with previous ECG   Rhythm: paced  Rate: normal  ST Segments: ST segments normal  T Waves: T waves normal  QRS axis: normal  Other: no other findings    Clinical impression: normal ECG  Comments: AV paced            Assessment & Plan    Diagnosis Plan   1. Second degree AV block, Mobitz type II, status post permanent pacemaker implantation with a [employer] device on 4/3/18.     2. Bilateral leg edema     3. Bilateral leg pain         Recommendations:  1. History of second-degree AV block type II  · Recent pacemaker interrogation showed normal function with 5-year battery life.  Continue with periodic pacemaker checks.    2.  Bilateral leg pain/edema  · Patient reports chronic leg edema and pain.  Discussed with patient about wearing compression stockings however she says this makes her edema worse.  It was noted that she saw Dr. Aviles 2 years ago in the office for which a venous reflux study was ordered which was read as normal.  Patient does report pain when she walks with some numbness.  I have recommended an arterial Doppler for further evaluation however she states  she is not interested in pursuing any further work-up at this time.  · Have asked patient to send in recent lab work to this office when she has this completed as we do not have any on file.      Return in about 6 months (around 11/24/2022).    As always, I appreciate very much the opportunity to participate in the cardiovascular care of your patients.      With Best Regards,          HEENA Alcantar

## 2022-05-24 NOTE — PROGRESS NOTES
Verna Moran APRN  Tessa Fisher  1940    Patient Active Problem List   Diagnosis   • Gastroesophageal reflux disease without esophagitis   • Right leg pain   • Generalized weakness   • Near syncope   • Screening   • Syncope   • Symptomatic bradycardia   • Former smoker   • History of pneumothorax   • Pacemaker   • Second degree AV block, Mobitz type II, status post permanent pacemaker implantation with a [employer] device on 4/3/18.   • Precordial pain   • Bilateral leg edema       Dear Verna Moran APRN:    Subjective     Chief Complaint   Patient presents with   • Follow-up     7 month   • Med Management           History of Present Illness:    Tessa Fisher is a 81 y.o. female with a past medical history of          No Known Allergies:      Current Outpatient Medications:   •  pantoprazole (PROTONIX) 40 MG EC tablet, Take 1 tablet by mouth 2 (Two) Times a Day., Disp: 60 tablet, Rfl: 2      The following portions of the patient's history were reviewed and updated as appropriate: allergies, current medications, past family history, past medical history, past social history, past surgical history and problem list.    Social History     Tobacco Use   • Smoking status: Former Smoker     Packs/day: 3.00     Years: 25.00     Pack years: 75.00     Types: Cigarettes     Quit date:      Years since quittin.4   • Smokeless tobacco: Never Used   Substance Use Topics   • Alcohol use: No   • Drug use: No       ROS    Objective   There were no vitals filed for this visit.  There is no height or weight on file to calculate BMI.        Physical Exam    Lab Results   Component Value Date     (L) 2021    K 4.8 2021    CL 99 2021    CO2 21.5 (L) 2021    BUN 10 2021    CREATININE 0.76 2021    GLUCOSE 73 2021    CALCIUM 10.0 2021    AST 23 2021    ALT 18 2021    ALKPHOS 79 2021    LABIL2 1.5 2015     Lab  Results   Component Value Date    CKTOTAL 49 04/02/2018     Lab Results   Component Value Date    WBC 7.21 08/17/2021    HGB 13.9 08/17/2021    HCT 41.4 08/17/2021     08/17/2021     Lab Results   Component Value Date    INR 1.06 04/04/2018    INR 0.94 04/16/2017     Lab Results   Component Value Date    MG 2.0 08/17/2021     Lab Results   Component Value Date    TSH 3.670 08/17/2021    TRIG 87 04/17/2017    HDL 53 (L) 04/17/2017    LDL 83 04/17/2017      Lab Results   Component Value Date    BNP 45.0 04/01/2018             ECG 12 Lead    Date/Time: 5/24/2022 12:44 PM  Performed by: Michelle Doty APRN  Authorized by: Michelle Doty APRN                 Assessment & Plan   No diagnosis found.             Recommendations:    1.         No follow-ups on file.    As always, I appreciate very much the opportunity to participate in the cardiovascular care of your patients.      With Best Regards,    HEENA Alcantar

## 2022-08-18 ENCOUNTER — OFFICE VISIT (OUTPATIENT)
Dept: FAMILY MEDICINE CLINIC | Facility: CLINIC | Age: 82
End: 2022-08-18

## 2022-08-18 VITALS
DIASTOLIC BLOOD PRESSURE: 80 MMHG | HEART RATE: 71 BPM | SYSTOLIC BLOOD PRESSURE: 128 MMHG | OXYGEN SATURATION: 98 % | BODY MASS INDEX: 27.26 KG/M2 | TEMPERATURE: 96.6 F | WEIGHT: 169.6 LBS | HEIGHT: 66 IN

## 2022-08-18 DIAGNOSIS — Z00.00 ENCOUNTER FOR MEDICARE ANNUAL WELLNESS EXAM: Primary | ICD-10-CM

## 2022-08-18 DIAGNOSIS — R60.0 BILATERAL LEG EDEMA: ICD-10-CM

## 2022-08-18 PROCEDURE — 1170F FXNL STATUS ASSESSED: CPT | Performed by: FAMILY MEDICINE

## 2022-08-18 PROCEDURE — 85025 COMPLETE CBC W/AUTO DIFF WBC: CPT | Performed by: FAMILY MEDICINE

## 2022-08-18 PROCEDURE — 1160F RVW MEDS BY RX/DR IN RCRD: CPT | Performed by: FAMILY MEDICINE

## 2022-08-18 PROCEDURE — G0439 PPPS, SUBSEQ VISIT: HCPCS | Performed by: FAMILY MEDICINE

## 2022-08-18 PROCEDURE — 83036 HEMOGLOBIN GLYCOSYLATED A1C: CPT | Performed by: FAMILY MEDICINE

## 2022-08-18 PROCEDURE — 84443 ASSAY THYROID STIM HORMONE: CPT | Performed by: FAMILY MEDICINE

## 2022-08-18 PROCEDURE — 80053 COMPREHEN METABOLIC PANEL: CPT | Performed by: FAMILY MEDICINE

## 2022-08-18 RX ORDER — PANTOPRAZOLE SODIUM 40 MG/1
40 TABLET, DELAYED RELEASE ORAL 2 TIMES DAILY
Qty: 90 TABLET | Refills: 2 | Status: SHIPPED | OUTPATIENT
Start: 2022-08-18

## 2022-08-18 NOTE — PROGRESS NOTES
Annual Exam      HEALTH RISK ASSESSMENT    1940    Recent Hospitalizations:  No hospitalization(s) within the last year..        Current Medical Providers:  Patient Care Team:  Verna Moran APRN as PCP - General (Nurse Practitioner)        Smoking Status:  Social History     Tobacco Use   Smoking Status Former Smoker   • Packs/day: 3.00   • Years: 25.00   • Pack years: 75.00   • Types: Cigarettes   • Quit date:    • Years since quittin.6   Smokeless Tobacco Never Used       Alcohol Consumption:  Social History     Substance and Sexual Activity   Alcohol Use No       Depression Screen:   PHQ-2/PHQ-9 Depression Screening 2022   Retired PHQ-9 Total Score -   Retired Total Score -   Little Interest or Pleasure in Doing Things 0-->not at all   Feeling Down, Depressed or Hopeless 0-->not at all   PHQ-9: Brief Depression Severity Measure Score 0       Health Habits and Functional and Cognitive Screening:  Functional & Cognitive Status 2021   Do you have difficulty preparing food and eating? No   Do you have difficulty bathing yourself, getting dressed or grooming yourself? No   Do you have difficulty using the toilet? No   Do you have difficulty moving around from place to place? No   Do you have trouble with steps or getting out of a bed or a chair? No   Current Diet Well Balanced Diet   Dental Exam Up to date   Eye Exam Up to date   Exercise (times per week) 7 times per week   Current Exercises Include Yard Work   Do you need help using the phone?  No   Are you deaf or do you have serious difficulty hearing?  No   Do you need help with transportation? No   Do you need help shopping? No   Do you need help preparing meals?  No   Do you need help with housework?  No   Do you need help with laundry? No   Do you need help taking your medications? No   Do you need help managing money? No   Do you ever drive or ride in a car without wearing a seat belt? No   Have you felt unusual stress, anger or  loneliness in the last month? No   Who do you live with? Alone   If you need help, do you have trouble finding someone available to you? No   Have you been bothered in the last four weeks by sexual problems? (No Data)   Do you have difficulty concentrating, remembering or making decisions? No           Does the patient have evidence of cognitive impairment? No    Aspirin use counseling: Does not need ASA (and currently is not on it)      Recent Lab Results:  CMP:  Lab Results   Component Value Date    BUN 10 08/17/2021    CREATININE 0.76 08/17/2021    EGFRIFNONA 73 08/17/2021    BCR 13.2 08/17/2021     (L) 08/17/2021    K 4.8 08/17/2021    CO2 21.5 (L) 08/17/2021    CALCIUM 10.0 08/17/2021    ALBUMIN 4.30 08/17/2021    LABIL2 1.5 05/29/2015    BILITOT 0.4 08/17/2021    ALKPHOS 79 08/17/2021    AST 23 08/17/2021    ALT 18 08/17/2021     Lipid Panel:  Lab Results   Component Value Date    CHOL 153 04/17/2017    TRIG 87 04/17/2017    HDL 53 (L) 04/17/2017    VLDL 17.4 04/17/2017    LDLHDL 1.56 04/17/2017     HbA1c:  Lab Results   Component Value Date    HGBA1C 5.70 04/16/2017       Visual Acuity:   Visual Acuity Screening    Right eye Left eye Both eyes   Without correction: 20 25  20 25  20 20   With correction:          Age-appropriate Screening Schedule:  Refer to the list below for future screening recommendations based on patient's age, sex and/or medical conditions. Orders for these recommended tests are listed in the plan section. The patient has been provided with a written plan.    Health Maintenance   Topic Date Due   • DXA SCAN  Never done   • TDAP/TD VACCINES (1 - Tdap) Never done   • ZOSTER VACCINE (2 of 2) 06/08/2018   • INFLUENZA VACCINE  10/01/2022        Subjective   History of Present Illness    Tessa Fisher is a 81 y.o. female who presents for an annual physical exam.    The following portions of the patient's history were reviewed and updated as appropriate: allergies, current  "medications, past family history, past medical history, past social history, past surgical history and problem list.    Outpatient Medications Prior to Visit   Medication Sig Dispense Refill   • pantoprazole (PROTONIX) 40 MG EC tablet Take 1 tablet by mouth 2 (Two) Times a Day. 60 tablet 2     No facility-administered medications prior to visit.       Patient Active Problem List   Diagnosis   • Gastroesophageal reflux disease without esophagitis   • Bilateral leg pain   • Generalized weakness   • Near syncope   • Screening   • Syncope   • Symptomatic bradycardia   • Former smoker   • History of pneumothorax   • Pacemaker   • Second degree AV block, Mobitz type II, status post permanent pacemaker implantation with a [employer] device on 4/3/18.   • Precordial pain   • Bilateral leg edema       Advance Care Planning:  ACP discussion was declined by the patient. Patient has an advance directive in EMR which is still valid.     Identification of Risk Factors:  Risk factors include: Fall Risk.    Review of Systems    Compared to one year ago, the patient feels her physical health is the same.  Compared to one year ago, the patient feels her mental health is the same.    Objective     Physical Exam    Vitals:    08/18/22 1104   BP: 128/80   BP Location: Right arm   Patient Position: Sitting   Cuff Size: Adult   Pulse: 71   Temp: 96.6 °F (35.9 °C)   TempSrc: Temporal   SpO2: 98%   Weight: 76.9 kg (169 lb 9.6 oz)   Height: 167.6 cm (66\")       BMI is >= 25 and <30. (Overweight) The following options were offered after discussion;: weight loss educational material (shared in after visit summary)      Assessment & Plan   Patient Self-Management and Personalized Health Advice  The patient has been provided with information about: diet, exercise and weight management and preventive services including:   · Annual Wellness Visit (AWV).    Visit Diagnoses:  No diagnosis found.    No orders of the defined types were placed " in this encounter.      Outpatient Encounter Medications as of 8/18/2022   Medication Sig Dispense Refill   • pantoprazole (PROTONIX) 40 MG EC tablet Take 1 tablet by mouth 2 (Two) Times a Day. 60 tablet 2     No facility-administered encounter medications on file as of 8/18/2022.       Reviewed use of high risk medication in the elderly: yes  Reviewed for potential of harmful drug interactions in the elderly: yes    Patient's Body mass index is 27.37 kg/m². indicating that she is overweight (BMI 25-29.9). Patient's (Body mass index is 27.37 kg/m².) indicates that they are overweight with health conditions that include GERD . Weight is unchanged. BMI is is above average; BMI management plan is completed. We discussed low calorie, low carb based diet program, portion control and increasing exercise. .    Follow Up:  No follow-ups on file.     An After Visit Summary and PPPS with all of these plans were given to the patient.             This document has been electronically signed by HEENA Vasquez  August 18, 2022 11:13 EDT     Part of this note may be an electronic transcription/translation of spoken language to printed text using the Dragon Dictation System.

## 2022-08-19 LAB
ALBUMIN SERPL-MCNC: 4 G/DL (ref 3.5–5.2)
ALBUMIN/GLOB SERPL: 1.7 G/DL
ALP SERPL-CCNC: 80 U/L (ref 39–117)
ALT SERPL W P-5'-P-CCNC: 18 U/L (ref 1–33)
ANION GAP SERPL CALCULATED.3IONS-SCNC: 12 MMOL/L (ref 5–15)
AST SERPL-CCNC: 20 U/L (ref 1–32)
BASOPHILS # BLD AUTO: 0.05 10*3/MM3 (ref 0–0.2)
BASOPHILS NFR BLD AUTO: 0.9 % (ref 0–1.5)
BILIRUB SERPL-MCNC: 0.4 MG/DL (ref 0–1.2)
BUN SERPL-MCNC: 12 MG/DL (ref 8–23)
BUN/CREAT SERPL: 15.6 (ref 7–25)
CALCIUM SPEC-SCNC: 9.8 MG/DL (ref 8.6–10.5)
CHLORIDE SERPL-SCNC: 101 MMOL/L (ref 98–107)
CO2 SERPL-SCNC: 24 MMOL/L (ref 22–29)
CREAT SERPL-MCNC: 0.77 MG/DL (ref 0.57–1)
DEPRECATED RDW RBC AUTO: 42.8 FL (ref 37–54)
EGFRCR SERPLBLD CKD-EPI 2021: 78.1 ML/MIN/1.73
EOSINOPHIL # BLD AUTO: 0.11 10*3/MM3 (ref 0–0.4)
EOSINOPHIL NFR BLD AUTO: 1.9 % (ref 0.3–6.2)
ERYTHROCYTE [DISTWIDTH] IN BLOOD BY AUTOMATED COUNT: 13.3 % (ref 12.3–15.4)
GLOBULIN UR ELPH-MCNC: 2.4 GM/DL
GLUCOSE SERPL-MCNC: 78 MG/DL (ref 65–99)
HBA1C MFR BLD: 5.6 % (ref 4.8–5.6)
HCT VFR BLD AUTO: 39.6 % (ref 34–46.6)
HGB BLD-MCNC: 13.3 G/DL (ref 12–15.9)
IMM GRANULOCYTES # BLD AUTO: 0.01 10*3/MM3 (ref 0–0.05)
IMM GRANULOCYTES NFR BLD AUTO: 0.2 % (ref 0–0.5)
LYMPHOCYTES # BLD AUTO: 1.72 10*3/MM3 (ref 0.7–3.1)
LYMPHOCYTES NFR BLD AUTO: 29.8 % (ref 19.6–45.3)
MCH RBC QN AUTO: 29.8 PG (ref 26.6–33)
MCHC RBC AUTO-ENTMCNC: 33.6 G/DL (ref 31.5–35.7)
MCV RBC AUTO: 88.8 FL (ref 79–97)
MONOCYTES # BLD AUTO: 0.43 10*3/MM3 (ref 0.1–0.9)
MONOCYTES NFR BLD AUTO: 7.5 % (ref 5–12)
NEUTROPHILS NFR BLD AUTO: 3.45 10*3/MM3 (ref 1.7–7)
NEUTROPHILS NFR BLD AUTO: 59.7 % (ref 42.7–76)
NRBC BLD AUTO-RTO: 0 /100 WBC (ref 0–0.2)
PLATELET # BLD AUTO: 233 10*3/MM3 (ref 140–450)
PMV BLD AUTO: 10.4 FL (ref 6–12)
POTASSIUM SERPL-SCNC: 4.7 MMOL/L (ref 3.5–5.2)
PROT SERPL-MCNC: 6.4 G/DL (ref 6–8.5)
RBC # BLD AUTO: 4.46 10*6/MM3 (ref 3.77–5.28)
SODIUM SERPL-SCNC: 137 MMOL/L (ref 136–145)
TSH SERPL DL<=0.05 MIU/L-ACNC: 3.67 UIU/ML (ref 0.27–4.2)
WBC NRBC COR # BLD: 5.77 10*3/MM3 (ref 3.4–10.8)

## 2022-08-24 ENCOUNTER — TELEPHONE (OUTPATIENT)
Dept: FAMILY MEDICINE CLINIC | Facility: CLINIC | Age: 82
End: 2022-08-24

## 2022-08-24 NOTE — TELEPHONE ENCOUNTER
----- Message from HEENA Vasquez sent at 8/24/2022  9:36 AM EDT -----  Please send a letter letting the patient know labs are normal. Thank you    LETTER MAILED.

## 2022-11-28 ENCOUNTER — OFFICE VISIT (OUTPATIENT)
Dept: CARDIOLOGY | Facility: CLINIC | Age: 82
End: 2022-11-28

## 2022-11-28 VITALS
SYSTOLIC BLOOD PRESSURE: 119 MMHG | WEIGHT: 169.8 LBS | HEART RATE: 69 BPM | RESPIRATION RATE: 16 BRPM | DIASTOLIC BLOOD PRESSURE: 74 MMHG | HEIGHT: 66 IN | BODY MASS INDEX: 27.29 KG/M2

## 2022-11-28 DIAGNOSIS — I44.1 SECOND DEGREE AV BLOCK, MOBITZ TYPE II: Primary | ICD-10-CM

## 2022-11-28 PROCEDURE — 93000 ELECTROCARDIOGRAM COMPLETE: CPT | Performed by: PHYSICIAN ASSISTANT

## 2022-11-28 PROCEDURE — 99213 OFFICE O/P EST LOW 20 MIN: CPT | Performed by: PHYSICIAN ASSISTANT

## 2022-11-28 RX ORDER — LANSOPRAZOLE 30 MG/1
30 CAPSULE, DELAYED RELEASE ORAL 2 TIMES DAILY
COMMUNITY

## 2022-11-28 NOTE — PROGRESS NOTES
Verna Moran APRN  Tessa Fisher  1940 11/28/2022    Patient Active Problem List   Diagnosis   • Gastroesophageal reflux disease without esophagitis   • Bilateral leg pain   • Generalized weakness   • Near syncope   • Screening   • Syncope   • Symptomatic bradycardia   • Former smoker   • History of pneumothorax   • Pacemaker   • Second degree AV block, Mobitz type II, status post permanent pacemaker implantation with a [employer] device on 4/3/18.   • Precordial pain   • Bilateral leg edema       Dear Verna Moran APRN:    Subjective     History of Present Illness:    Chief Complaint   Patient presents with   • Follow-up     6 mos   • Palpitations     occas   • Med Management     presented       Tessa Fisher is a pleasant 81 y.o. female with a past medical history significant for second-degree type II AV block status post dual-chamber pacemaker implantation with a [employer] device on April 3, 2018.  She comes in today for cardiology follow-up.    Tessa reports she has been well since she was last seen she has no complaints with open questions.  Upon further questioning she denies any chest pains, shortness of breath, palpitations, dizziness, or syncope.  She is able to form all of her ADLs without any limitations.              No Known Allergies:      Current Outpatient Medications:   •  lansoprazole (PREVACID) 30 MG capsule, Take 30 mg by mouth 2 (Two) Times a Day., Disp: , Rfl:   •  pantoprazole (PROTONIX) 40 MG EC tablet, Take 1 tablet by mouth 2 (Two) Times a Day., Disp: 90 tablet, Rfl: 2    The following portions of the patient's history were reviewed and updated as appropriate: allergies, current medications, past family history, past medical history, past social history, past surgical history and problem list.    Social History     Tobacco Use   • Smoking status: Former     Packs/day: 3.00     Years: 25.00     Pack years: 75.00     Types: Cigarettes     Quit date: 1975  "    Years since quittin.9   • Smokeless tobacco: Never   Substance Use Topics   • Alcohol use: No   • Drug use: No         Objective   Vitals:    22 1328   BP: 119/74   Pulse: 69   Resp: 16   Weight: 77 kg (169 lb 12.8 oz)   Height: 167.6 cm (66\")     Body mass index is 27.41 kg/m².    Constitutional:       General: Not in acute distress.     Appearance: Healthy appearance. Well-developed and not in distress. Not diaphoretic.   Eyes:      Conjunctiva/sclera: Conjunctivae normal.      Pupils: Pupils are equal, round, and reactive to light.   HENT:      Head: Normocephalic and atraumatic.   Neck:      Vascular: No carotid bruit or JVD.   Pulmonary:      Effort: Pulmonary effort is normal. No respiratory distress.      Breath sounds: Normal breath sounds.   Cardiovascular:      Normal rate. Regular rhythm.   Skin:     General: Skin is cool.   Neurological:      Mental Status: Alert, oriented to person, place, and time and oriented to person, place and time.         Lab Results   Component Value Date     2022    K 4.7 2022     2022    CO2 24.0 2022    BUN 12 2022    CREATININE 0.77 2022    GLUCOSE 78 2022    CALCIUM 9.8 2022    AST 20 2022    ALT 18 2022    ALKPHOS 80 2022    LABIL2 1.5 2015     Lab Results   Component Value Date    CKTOTAL 49 2018     Lab Results   Component Value Date    WBC 5.77 2022    HGB 13.3 2022    HCT 39.6 2022     2022     Lab Results   Component Value Date    INR 1.06 2018    INR 0.94 2017     Lab Results   Component Value Date    MG 2.0 2021     Lab Results   Component Value Date    TSH 3.670 2022    TRIG 87 2017    HDL 53 (L) 2017    LDL 83 2017      Lab Results   Component Value Date    BNP 45.0 2018       During this visit the following were done:  Labs Reviewed []    Labs Ordered []    Radiology Reports " Reviewed []    Radiology Ordered []    PCP Records Reviewed []    Referring Provider Records Reviewed []    ER Records Reviewed []    Hospital Records Reviewed []    History Obtained From Family []    Radiology Images Reviewed []    Other Reviewed []    Records Requested []         ECG 12 Lead    Date/Time: 11/28/2022 1:29 PM  Performed by: Lc Mackey PA-C  Authorized by: Lc Mackey PA-C   Comparison: compared with previous ECG   Similar to previous ECG  Rhythm: sinus rhythm  Pacing: ventricular paced rhythm and 100% capture  Clinical impression: normal ECG            Assessment & Plan    Diagnosis Plan   1. Second degree AV block, Mobitz type II, status post permanent pacemaker implantation with a [employer] device on 4/3/18.                 Recommendations:  1. Second-degree AV block status post [employer] pacemaker implantation  a. Doing well clinically no symptoms.  We will proceed with routine interrogations    No follow-ups on file.    As always, I appreciate very much the opportunity to participate in the cardiovascular care of your patients.      With Best Regards,    Lc Mackey PA-C

## 2023-05-30 ENCOUNTER — OFFICE VISIT (OUTPATIENT)
Dept: CARDIOLOGY | Facility: CLINIC | Age: 83
End: 2023-05-30

## 2023-05-30 VITALS
HEART RATE: 67 BPM | WEIGHT: 169.4 LBS | OXYGEN SATURATION: 97 % | DIASTOLIC BLOOD PRESSURE: 76 MMHG | BODY MASS INDEX: 27.23 KG/M2 | HEIGHT: 66 IN | SYSTOLIC BLOOD PRESSURE: 129 MMHG

## 2023-05-30 DIAGNOSIS — I44.1 SECOND DEGREE AV BLOCK, MOBITZ TYPE II: Primary | ICD-10-CM

## 2023-05-30 DIAGNOSIS — R00.1 SYMPTOMATIC BRADYCARDIA: ICD-10-CM

## 2023-05-30 PROCEDURE — 1159F MED LIST DOCD IN RCRD: CPT | Performed by: PHYSICIAN ASSISTANT

## 2023-05-30 PROCEDURE — 93000 ELECTROCARDIOGRAM COMPLETE: CPT | Performed by: PHYSICIAN ASSISTANT

## 2023-05-30 PROCEDURE — 99213 OFFICE O/P EST LOW 20 MIN: CPT | Performed by: PHYSICIAN ASSISTANT

## 2023-05-30 PROCEDURE — 1160F RVW MEDS BY RX/DR IN RCRD: CPT | Performed by: PHYSICIAN ASSISTANT

## 2023-05-30 NOTE — PROGRESS NOTES
Verna Moran APRN  Tessa Fisher  1940 05/30/2023    Patient Active Problem List   Diagnosis   • Gastroesophageal reflux disease without esophagitis   • Bilateral leg pain   • Generalized weakness   • Near syncope   • Screening   • Syncope   • Symptomatic bradycardia   • Former smoker   • History of pneumothorax   • Pacemaker   • Second degree AV block, Mobitz type II, status post permanent pacemaker implantation with a [employer] device on 4/3/18.   • Precordial pain   • Bilateral leg edema       Dear Verna Moran APRN:    Subjective     History of Present Illness:    Chief Complaint   Patient presents with   • Follow-up     ROUTINE       Tessa Fisher is a pleasant 82 y.o. female with a past medical history significant for second-degree type II AV block status post dual-chamber pacemaker implantation with a [employer] device on April 3, 2018.  She comes in today for cardiology follow-up.    Tessa reports she has been doing well and has no complaints with open questions.  She is still able to perform all of her ADLs without limitations including push mowing her lawn as well as weed eating.  She denies any chest pains, shortness of breath, orthopnea, PND.  She does have chronic pedal edema which is unchanged.  She denies any syncope or near syncope.  Blood pressure is well controlled.    No Known Allergies:      Current Outpatient Medications:   •  lansoprazole (PREVACID) 30 MG capsule, Take 1 capsule by mouth 2 (Two) Times a Day., Disp: , Rfl:   •  pantoprazole (PROTONIX) 40 MG EC tablet, Take 1 tablet by mouth 2 (Two) Times a Day. (Patient not taking: Reported on 5/30/2023), Disp: 90 tablet, Rfl: 2    The following portions of the patient's history were reviewed and updated as appropriate: allergies, current medications, past family history, past medical history, past social history, past surgical history and problem list.    Social History     Tobacco Use   • Smoking status:  "Former     Packs/day: 3.00     Years: 25.00     Pack years: 75.00     Types: Cigarettes     Quit date:      Years since quittin.4   • Smokeless tobacco: Never   Substance Use Topics   • Alcohol use: No   • Drug use: No         Objective   Vitals:    23 1309   BP: 129/76   Pulse: 67   SpO2: 97%   Weight: 76.8 kg (169 lb 6.4 oz)   Height: 167.6 cm (65.98\")     Body mass index is 27.36 kg/m².    Constitutional:       General: Not in acute distress.     Appearance: Healthy appearance. Well-developed and not in distress. Not diaphoretic.   Eyes:      Conjunctiva/sclera: Conjunctivae normal.      Pupils: Pupils are equal, round, and reactive to light.   HENT:      Head: Normocephalic and atraumatic.   Neck:      Vascular: No carotid bruit or JVD.   Pulmonary:      Effort: Pulmonary effort is normal. No respiratory distress.      Breath sounds: Normal breath sounds.   Cardiovascular:      Normal rate. Regular rhythm.   Skin:     General: Skin is cool.   Neurological:      Mental Status: Alert, oriented to person, place, and time and oriented to person, place and time.         Lab Results   Component Value Date     2022    K 4.7 2022     2022    CO2 24.0 2022    BUN 12 2022    CREATININE 0.77 2022    GLUCOSE 78 2022    CALCIUM 9.8 2022    AST 20 2022    ALT 18 2022    ALKPHOS 80 2022    LABIL2 1.5 2015     Lab Results   Component Value Date    CKTOTAL 49 2018     Lab Results   Component Value Date    WBC 5.77 2022    HGB 13.3 2022    HCT 39.6 2022     2022     Lab Results   Component Value Date    INR 1.06 2018    INR 0.94 2017     Lab Results   Component Value Date    MG 2.0 2021     Lab Results   Component Value Date    TSH 3.670 2022    TRIG 87 2017    HDL 53 (L) 2017    LDL 83 2017      Lab Results   Component Value Date    BNP 45.0 2018 "       During this visit the following were done:  Labs Reviewed []    Labs Ordered []    Radiology Reports Reviewed []    Radiology Ordered []    PCP Records Reviewed []    Referring Provider Records Reviewed []    ER Records Reviewed []    Hospital Records Reviewed []    History Obtained From Family []    Radiology Images Reviewed []    Other Reviewed []    Records Requested []         ECG 12 Lead    Date/Time: 5/30/2023 1:11 PM  Performed by: Lc Mackey PA-C  Authorized by: Lc Mackey PA-C   Comparison: compared with previous ECG   Similar to previous ECG  Rhythm: sinus rhythm  Pacing: ventricular paced rhythm  Clinical impression: normal ECG            Assessment & Plan    Diagnosis Plan   1. Second degree AV block, Mobitz type II, status post permanent pacemaker implantation with a [employer] device on 4/3/18.  ECG 12 Lead      2. Symptomatic bradycardia                 Recommendations:  1. Second-degree AV block status post permanent pacemaker implantation  a. Recent pacemaker interrogation was stable we will proceed with routine interrogations.  Patient otherwise stable.    Return in about 1 year (around 5/30/2024).    As always, I appreciate very much the opportunity to participate in the cardiovascular care of your patients.      With Best Regards,    Lc Mackey PA-C

## 2023-08-07 ENCOUNTER — LAB (OUTPATIENT)
Dept: FAMILY MEDICINE CLINIC | Facility: CLINIC | Age: 83
End: 2023-08-07
Payer: MEDICARE

## 2023-08-07 ENCOUNTER — TELEPHONE (OUTPATIENT)
Dept: FAMILY MEDICINE CLINIC | Facility: CLINIC | Age: 83
End: 2023-08-07
Payer: MEDICARE

## 2023-08-07 DIAGNOSIS — R60.0 BILATERAL LEG EDEMA: ICD-10-CM

## 2023-08-07 DIAGNOSIS — Z13.220 LIPID SCREENING: ICD-10-CM

## 2023-08-07 DIAGNOSIS — R60.0 BILATERAL LEG EDEMA: Primary | ICD-10-CM

## 2023-08-07 PROCEDURE — 80053 COMPREHEN METABOLIC PANEL: CPT | Performed by: FAMILY MEDICINE

## 2023-08-07 PROCEDURE — 36415 COLL VENOUS BLD VENIPUNCTURE: CPT

## 2023-08-07 PROCEDURE — 80061 LIPID PANEL: CPT | Performed by: FAMILY MEDICINE

## 2023-08-07 PROCEDURE — 84443 ASSAY THYROID STIM HORMONE: CPT | Performed by: FAMILY MEDICINE

## 2023-08-07 PROCEDURE — 85025 COMPLETE CBC W/AUTO DIFF WBC: CPT | Performed by: FAMILY MEDICINE

## 2023-08-07 PROCEDURE — 84439 ASSAY OF FREE THYROXINE: CPT | Performed by: FAMILY MEDICINE

## 2023-08-07 NOTE — TELEPHONE ENCOUNTER
Patients daughter called indicating Tessa has an appointment with you on Wednesday 8/9 and wants to know if she can have labs done prior so they can be reviewed at visit.  Please advise...

## 2023-08-08 LAB
ALBUMIN SERPL-MCNC: 4.2 G/DL (ref 3.5–5.2)
ALBUMIN/GLOB SERPL: 1.8 G/DL
ALP SERPL-CCNC: 85 U/L (ref 39–117)
ALT SERPL W P-5'-P-CCNC: 18 U/L (ref 1–33)
ANION GAP SERPL CALCULATED.3IONS-SCNC: 8.4 MMOL/L (ref 5–15)
AST SERPL-CCNC: 20 U/L (ref 1–32)
BASOPHILS # BLD AUTO: 0.04 10*3/MM3 (ref 0–0.2)
BASOPHILS NFR BLD AUTO: 0.8 % (ref 0–1.5)
BILIRUB SERPL-MCNC: 0.5 MG/DL (ref 0–1.2)
BUN SERPL-MCNC: 11 MG/DL (ref 8–23)
BUN/CREAT SERPL: 13.4 (ref 7–25)
CALCIUM SPEC-SCNC: 10 MG/DL (ref 8.6–10.5)
CHLORIDE SERPL-SCNC: 102 MMOL/L (ref 98–107)
CHOLEST SERPL-MCNC: 177 MG/DL (ref 0–200)
CO2 SERPL-SCNC: 26.6 MMOL/L (ref 22–29)
CREAT SERPL-MCNC: 0.82 MG/DL (ref 0.57–1)
DEPRECATED RDW RBC AUTO: 43.9 FL (ref 37–54)
EGFRCR SERPLBLD CKD-EPI 2021: 72 ML/MIN/1.73
EOSINOPHIL # BLD AUTO: 0.13 10*3/MM3 (ref 0–0.4)
EOSINOPHIL NFR BLD AUTO: 2.7 % (ref 0.3–6.2)
ERYTHROCYTE [DISTWIDTH] IN BLOOD BY AUTOMATED COUNT: 13 % (ref 12.3–15.4)
GLOBULIN UR ELPH-MCNC: 2.4 GM/DL
GLUCOSE SERPL-MCNC: 93 MG/DL (ref 65–99)
HCT VFR BLD AUTO: 41.5 % (ref 34–46.6)
HDLC SERPL-MCNC: 56 MG/DL (ref 40–60)
HGB BLD-MCNC: 13.7 G/DL (ref 12–15.9)
IMM GRANULOCYTES # BLD AUTO: 0.01 10*3/MM3 (ref 0–0.05)
IMM GRANULOCYTES NFR BLD AUTO: 0.2 % (ref 0–0.5)
LDLC SERPL CALC-MCNC: 107 MG/DL (ref 0–100)
LDLC/HDLC SERPL: 1.89 {RATIO}
LYMPHOCYTES # BLD AUTO: 1.09 10*3/MM3 (ref 0.7–3.1)
LYMPHOCYTES NFR BLD AUTO: 22.5 % (ref 19.6–45.3)
MCH RBC QN AUTO: 30.6 PG (ref 26.6–33)
MCHC RBC AUTO-ENTMCNC: 33 G/DL (ref 31.5–35.7)
MCV RBC AUTO: 92.8 FL (ref 79–97)
MONOCYTES # BLD AUTO: 0.46 10*3/MM3 (ref 0.1–0.9)
MONOCYTES NFR BLD AUTO: 9.5 % (ref 5–12)
NEUTROPHILS NFR BLD AUTO: 3.12 10*3/MM3 (ref 1.7–7)
NEUTROPHILS NFR BLD AUTO: 64.3 % (ref 42.7–76)
NRBC BLD AUTO-RTO: 0 /100 WBC (ref 0–0.2)
PLATELET # BLD AUTO: 211 10*3/MM3 (ref 140–450)
PMV BLD AUTO: 10.5 FL (ref 6–12)
POTASSIUM SERPL-SCNC: 5.4 MMOL/L (ref 3.5–5.2)
PROT SERPL-MCNC: 6.6 G/DL (ref 6–8.5)
RBC # BLD AUTO: 4.47 10*6/MM3 (ref 3.77–5.28)
SODIUM SERPL-SCNC: 137 MMOL/L (ref 136–145)
T4 FREE SERPL-MCNC: 0.85 NG/DL (ref 0.93–1.7)
TRIGL SERPL-MCNC: 76 MG/DL (ref 0–150)
TSH SERPL DL<=0.05 MIU/L-ACNC: 3.42 UIU/ML (ref 0.27–4.2)
VLDLC SERPL-MCNC: 14 MG/DL (ref 5–40)
WBC NRBC COR # BLD: 4.85 10*3/MM3 (ref 3.4–10.8)

## 2023-08-09 ENCOUNTER — OFFICE VISIT (OUTPATIENT)
Dept: FAMILY MEDICINE CLINIC | Facility: CLINIC | Age: 83
End: 2023-08-09
Payer: MEDICARE

## 2023-08-09 VITALS
TEMPERATURE: 98 F | BODY MASS INDEX: 27.42 KG/M2 | OXYGEN SATURATION: 98 % | DIASTOLIC BLOOD PRESSURE: 84 MMHG | WEIGHT: 170.6 LBS | HEIGHT: 66 IN | HEART RATE: 76 BPM | SYSTOLIC BLOOD PRESSURE: 130 MMHG

## 2023-08-09 DIAGNOSIS — Z00.00 MEDICARE ANNUAL WELLNESS VISIT, SUBSEQUENT: Primary | ICD-10-CM

## 2023-08-09 DIAGNOSIS — K21.9 GASTROESOPHAGEAL REFLUX DISEASE WITHOUT ESOPHAGITIS: ICD-10-CM

## 2023-08-09 RX ORDER — LANSOPRAZOLE 30 MG/1
30 CAPSULE, DELAYED RELEASE ORAL 2 TIMES DAILY
Qty: 180 CAPSULE | Refills: 1 | Status: SHIPPED | OUTPATIENT
Start: 2023-08-09

## 2023-08-09 NOTE — PROGRESS NOTES
Annual Exam      HEALTH RISK ASSESSMENT    1940    Recent Hospitalizations:  No hospitalization(s) within the last year..        Current Medical Providers:  Patient Care Team:  Verna Moran APRN as PCP - General (Nurse Practitioner)        Smoking Status:  Social History     Tobacco Use   Smoking Status Former    Packs/day: 3.00    Years: 25.00    Pack years: 75.00    Types: Cigarettes    Quit date: 1975    Years since quittin.6   Smokeless Tobacco Never       Alcohol Consumption:  Social History     Substance and Sexual Activity   Alcohol Use No       Depression Screen:       2023    11:36 AM   PHQ-2/PHQ-9 Depression Screening   Little Interest or Pleasure in Doing Things 0-->not at all   Feeling Down, Depressed or Hopeless 0-->not at all   PHQ-9: Brief Depression Severity Measure Score 0       Health Habits and Functional and Cognitive Screenin/9/2023    11:37 AM   Functional & Cognitive Status   Do you have difficulty preparing food and eating? No   Do you have difficulty bathing yourself, getting dressed or grooming yourself? No   Do you have difficulty using the toilet? No   Do you have difficulty moving around from place to place? No   Do you have trouble with steps or getting out of a bed or a chair? No   Current Diet Well Balanced Diet   Dental Exam Up to date   Eye Exam Up to date   Exercise (times per week) 7 times per week   Current Exercises Include Walking   Do you need help using the phone?  No   Are you deaf or do you have serious difficulty hearing?  No   Do you need help to go to places out of walking distance? No   Do you need help shopping? No   Do you need help preparing meals?  No   Do you need help with housework?  No   Do you need help with laundry? No   Do you need help taking your medications? No   Do you need help managing money? No   Do you ever drive or ride in a car without wearing a seat belt? No           Does the patient have evidence of cognitive  impairment? No    Aspirin use counseling: Does not need ASA (and currently is not on it)      Recent Lab Results:  CMP:  Lab Results   Component Value Date    BUN 11 08/07/2023    CREATININE 0.82 08/07/2023    EGFRIFNONA 73 08/17/2021    BCR 13.4 08/07/2023     08/07/2023    K 5.4 (H) 08/07/2023    CO2 26.6 08/07/2023    CALCIUM 10.0 08/07/2023    ALBUMIN 4.2 08/07/2023    LABIL2 1.5 05/29/2015    BILITOT 0.5 08/07/2023    ALKPHOS 85 08/07/2023    AST 20 08/07/2023    ALT 18 08/07/2023     Lipid Panel:  Lab Results   Component Value Date    CHOL 177 08/07/2023    TRIG 76 08/07/2023    HDL 56 08/07/2023    VLDL 14 08/07/2023    LDLHDL 1.89 08/07/2023     HbA1c:  Lab Results   Component Value Date    HGBA1C 5.60 08/18/2022       Visual Acuity:  Vision Screening    Right eye Left eye Both eyes   Without correction 20/25 20/25 20/20   With correction          Age-appropriate Screening Schedule:  Refer to the list below for future screening recommendations based on patient's age, sex and/or medical conditions. Orders for these recommended tests are listed in the plan section. The patient has been provided with a written plan.    Health Maintenance   Topic Date Due    DXA SCAN  Never done    TDAP/TD VACCINES (1 - Tdap) Never done    Pneumococcal Vaccine 65+ (1 - PCV) Never done    ZOSTER VACCINE (2 of 2) 06/08/2018    INFLUENZA VACCINE  10/01/2023    ANNUAL WELLNESS VISIT  08/09/2024    COLORECTAL CANCER SCREENING  03/04/2030    COVID-19 Vaccine  Discontinued        Subjective   History of Present Illness    Tessa Fisher is a 82 y.o. female who presents for an annual physical exam.    The following portions of the patient's history were reviewed and updated as appropriate: allergies, current medications, past family history, past medical history, past social history, past surgical history, and problem list.    Outpatient Medications Prior to Visit   Medication Sig Dispense Refill    lansoprazole (PREVACID) 30  MG capsule Take 1 capsule by mouth 2 (Two) Times a Day.      pantoprazole (PROTONIX) 40 MG EC tablet Take 1 tablet by mouth 2 (Two) Times a Day. (Patient not taking: Reported on 5/30/2023) 90 tablet 2     No facility-administered medications prior to visit.       Patient Active Problem List   Diagnosis    Gastroesophageal reflux disease without esophagitis    Bilateral leg pain    Generalized weakness    Near syncope    Screening    Syncope    Symptomatic bradycardia    Former smoker    History of pneumothorax    Pacemaker    Second degree AV block, Mobitz type II, status post permanent pacemaker implantation with a [employer] device on 4/3/18.    Precordial pain    Bilateral leg edema       Advance Care Planning:  ACP discussion was declined by the patient. Patient has an advance directive (not in EMR), copy requested.    Identification of Risk Factors:  Risk factors include: Depression/Dysphoria.    Review of Systems    Compared to one year ago, the patient feels her physical health is the same.  Compared to one year ago, the patient feels her mental health is the same.    Objective     Physical Exam  Constitutional:       General: She is not in acute distress.     Appearance: Normal appearance. She is well-developed and well-groomed. She is not ill-appearing, toxic-appearing or diaphoretic.   HENT:      Head: Normocephalic.      Nose: Nose normal. No congestion or rhinorrhea.      Mouth/Throat:      Mouth: Mucous membranes are moist.      Pharynx: Oropharynx is clear. No oropharyngeal exudate or posterior oropharyngeal erythema.   Eyes:      General: Lids are normal.         Right eye: No discharge.         Left eye: No discharge.      Extraocular Movements: Extraocular movements intact.      Pupils: Pupils are equal, round, and reactive to light.   Neck:      Vascular: No carotid bruit.   Cardiovascular:      Rate and Rhythm: Normal rate and regular rhythm.      Pulses: Normal pulses.      Heart sounds:  "Normal heart sounds. No murmur heard.    No friction rub. No gallop.   Pulmonary:      Effort: Pulmonary effort is normal. No respiratory distress.      Breath sounds: Normal breath sounds. No stridor. No wheezing, rhonchi or rales.   Chest:      Chest wall: No tenderness.   Abdominal:      General: Bowel sounds are normal. There is no distension.      Palpations: Abdomen is soft. There is no mass.      Tenderness: There is no abdominal tenderness. There is no right CVA tenderness, left CVA tenderness, guarding or rebound.      Hernia: No hernia is present.   Musculoskeletal:         General: No swelling or tenderness. Normal range of motion.      Cervical back: Normal range of motion and neck supple. No rigidity or tenderness.      Right lower leg: No edema.      Left lower leg: No edema.   Lymphadenopathy:      Cervical: No cervical adenopathy.   Skin:     General: Skin is warm.      Capillary Refill: Capillary refill takes less than 2 seconds.      Coloration: Skin is not jaundiced.      Findings: No bruising, erythema or rash.   Neurological:      General: No focal deficit present.      Mental Status: She is alert and oriented to person, place, and time.      Motor: Motor function is intact. No weakness.      Coordination: Coordination is intact.      Gait: Gait is intact. Gait normal.   Psychiatric:         Attention and Perception: Attention normal.         Mood and Affect: Mood normal.         Speech: Speech normal.         Behavior: Behavior normal.         Cognition and Memory: Cognition normal.         Judgment: Judgment normal.       Vitals:    08/09/23 1133   BP: 130/84   BP Location: Right arm   Patient Position: Sitting   Cuff Size: Adult   Pulse: 76   Temp: 98 øF (36.7 øC)   TempSrc: Temporal   SpO2: 98%   Weight: 77.4 kg (170 lb 9.6 oz)   Height: 167.6 cm (65.98\")   PainSc: 0-No pain       BMI is >= 25 and <30. (Overweight) The following options were offered after discussion;: weight loss educational " material (shared in after visit summary), exercise counseling/recommendations, and nutrition counseling/recommendations      Assessment & Plan   Patient Self-Management and Personalized Health Advice  The patient has been provided with information about: diet, exercise, and weight management and preventive services including:   Annual Wellness Visit (AWV).    Visit Diagnoses:    ICD-10-CM ICD-9-CM   1. Medicare annual wellness visit, subsequent  Z00.00 V70.0   2. Gastroesophageal reflux disease without esophagitis  K21.9 530.81       No orders of the defined types were placed in this encounter.      Outpatient Encounter Medications as of 8/9/2023   Medication Sig Dispense Refill    lansoprazole (PREVACID) 30 MG capsule Take 1 capsule by mouth 2 (Two) Times a Day. 180 capsule 1    [DISCONTINUED] lansoprazole (PREVACID) 30 MG capsule Take 1 capsule by mouth 2 (Two) Times a Day.      [DISCONTINUED] pantoprazole (PROTONIX) 40 MG EC tablet Take 1 tablet by mouth 2 (Two) Times a Day. (Patient not taking: Reported on 5/30/2023) 90 tablet 2     No facility-administered encounter medications on file as of 8/9/2023.       Reviewed use of high risk medication in the elderly: yes  Reviewed for potential of harmful drug interactions in the elderly: yes    Patient's Body mass index is 27.55 kg/mý. indicating that she is overweight (BMI 25-29.9). Patient's (Body mass index is 27.55 kg/mý.) indicates that they are overweight with health conditions that include GERD . Weight is unchanged. BMI is is above average; BMI management plan is completed. We discussed low calorie, low carb based diet program, portion control, and increasing exercise. .    Follow Up:  Return in about 6 months (around 2/9/2024), or if symptoms worsen or fail to improve.     An After Visit Summary and PPPS with all of these plans were given to the patient.             This document has been electronically signed by HEENA Vasquez  August 10, 2023 11:06  EDT     Part of this note may be an electronic transcription/translation of spoken language to printed text using the Dragon Dictation System.

## 2023-09-27 ENCOUNTER — OFFICE VISIT (OUTPATIENT)
Dept: SURGERY | Facility: CLINIC | Age: 83
End: 2023-09-27
Payer: MEDICARE

## 2023-09-27 VITALS — WEIGHT: 168 LBS | HEIGHT: 66 IN | BODY MASS INDEX: 27 KG/M2

## 2023-09-27 DIAGNOSIS — L98.9 SKIN LESION: ICD-10-CM

## 2023-09-27 DIAGNOSIS — L98.9 SKIN LESION: Primary | ICD-10-CM

## 2023-09-27 NOTE — PROGRESS NOTES
Date of Service: 2023    Subjective   Tessa Fisher is a 82 y.o. female is being in consultation today at the request of Verna Moran APRN    Chief Complaint: left sided nose lesion    Tessa Fisher is a 82 y.o. female who presents with a lesion on the left side of her nose. She has noticed it over the past year and it has slightly enlarged. She intermittently will scratch it. She has a history of multiple skin cancers removed by both general surgery and dermatology.     Past Medical History:   Diagnosis Date    Basal cell carcinoma 2016    Left groin    Diastolic congestive heart failure     GERD (gastroesophageal reflux disease)     Hiatal hernia     LBBB (left bundle branch block)     Pacemaker     Popliteal cyst, right        Past Surgical History:   Procedure Laterality Date    APPENDECTOMY      CARDIAC ELECTROPHYSIOLOGY PROCEDURE N/A 2018    Procedure: Pacemaker SC new;  Surgeon: Refugio Gautam MD;  Location: [facility] CATH INVASIVE LOCATION;  Service: Cardiovascular    CATARACT EXTRACTION      COLONOSCOPY      COLONOSCOPY N/A 2017    Procedure: COLONOSCOPY;  Surgeon: Arun Phelps MD;  Location: [facility] OR;  Service:     ENDOSCOPY N/A 2016    Procedure: ESOPHAGOGASTRODUODENOSCOPY WITH ANESTHESIA;  Surgeon: Arun Phelps MD;  Location: [facility] OR;  Service:     EYE SURGERY      PACEMAKER IMPLANTATION      SKIN CANCER EXCISION           Family History   Problem Relation Age of Onset    Cancer Mother         Basal cell cancer of the skin     Cancer Father     Cancer Brother         Basal cell cancer of the skin    Diabetes Neg Hx     Heart disease Neg Hx     Hypertension Neg Hx          Social History     Socioeconomic History    Marital status:    Tobacco Use    Smoking status: Former     Packs/day: 3.00     Years: 25.00     Pack years: 75.00     Types: Cigarettes     Quit date: 1975     Years since quittin.7    Smokeless tobacco: Never  "  Vaping Use    Vaping Use: Never used   Substance and Sexual Activity    Alcohol use: No    Drug use: No    Sexual activity: Defer                Review of Systems   Pertinent items are noted in HPI     Constitutional: No fevers, chills or malaise. No unintentional weight loss   Eyes: Denies visual changes    Cardiovascular: Denies chest pain, palpitations   Respiratory: Denies cough or shortness of breath   Abdominal/Gastrointestinal: No abdominal pain, no nausea/vomiting   Genitourinary: Denies dysuria or hematuria   Musculoskeletal: Denies any chronic joint aches, pains or deformities              Skin: No lesions or rashes   Psychiatric: No recent mood changes   Neurologic: No paresthesias or loss of function        Objective       Physical Exam:      09/27/23  1402   Weight: 76.2 kg (168 lb)    Body mass index is 27.13 kg/m².  Constitution: Ht 167.6 cm (65.98\")   Wt 76.2 kg (168 lb)   BMI 27.13 kg/m²  . No acute distress  Head: Normocephalic, atraumatic.   Eyes: Aligned without strabismus. Conjunctivae noninjected   Ears, Nose, Mouth: Normal dentition, no lesions noted  CV: Regular rate and rhythm   Respiratory: Symmetric chest expansion. No respiratory distress.   Gastrointestinal:  Soft, nontender, nondistended   Skin:  Small (<1cm) scaly lesion located on left nostril. No palpable mass on interior of nose.   Lymphatics: No abnormal cervical or supraclavicular adenopathy  Neurologic: No gross deficits.  Alert and oriented x3  Psychiatric: Normal mood    IN-OFFICE PROCEDURE NOTE      Patient Name:  Tessa Fisher  YOB: 1940  [id number]    9/27/2023    PREOPERATIVE DIAGNOSIS: left nasal skin lesion    POSTOPERATIVE DIAGNOSIS: same     PROCEDURE PERFORMED: 2mm punch biopsy     SURGEON: Ching Murillo MD      SPECIMENS: skin lesion of left nose     ANESTHESIA: Local     DESCRIPTION OF PROCEDURE:     Area prepped. Local anesthetic injected. 2mm punch biopsy performed. Silver nitrate used " for hemostasis. Dressing applied once hemostatic.     Specimen sent for pathologic evaluation.       Assessment   Diagnoses and all orders for this visit:    1. Skin lesion (Primary)      Tessa Fisher is a 82 y.o. female with a scaly skin lesion on the left side of her nose concerning for possible malignancy. Punch biopsy performed in office. I will call her with the results.          Ching Barrientos MD  [facility]

## 2023-09-29 LAB — REF LAB TEST METHOD: NORMAL

## 2023-10-02 ENCOUNTER — TELEPHONE (OUTPATIENT)
Dept: SURGERY | Facility: CLINIC | Age: 83
End: 2023-10-02
Payer: MEDICARE

## 2023-10-02 DIAGNOSIS — C44.311 BASAL CELL CARCINOMA (BCC) OF SKIN OF NOSE: Primary | ICD-10-CM

## 2023-10-02 NOTE — TELEPHONE ENCOUNTER
Discussed results with patient regarding biopsy with malignant results: basal cell carcinoma.     Will plan to refer patient to plastic surgery for likely required reconstruction.     Ching Barrientos MD       General Surgeon  [facility]

## 2023-10-05 ENCOUNTER — DOCUMENTATION (OUTPATIENT)
Dept: SURGERY | Facility: CLINIC | Age: 83
End: 2023-10-05
Payer: MEDICARE

## 2023-10-05 NOTE — PROGRESS NOTES
Provider Request  Request for consult/opinion    Referring Provider Name  Ching Murillo MD    Referring Provider Primary Phone  [phone]    Fax  [phone]    Email Address  [email or web address]    Contact Name  Ashley Collester    Referring Office  [facility]  [address] [place] [address], [address]  [place], [place]. [address]    Name  Tessa Fisher    Date of Birth  Sat, 1940 - 00:00    Gender  Female    What is the patient's primary language?  English    Does the patient need an ?  No    Patient Mailing Address  [address] [place] [address]. [address]    Patient Primary Phone  [phone]    Alternate Phone  [phone]    Other Comments  Refer to  plastics for skin cancer of the face    Insurance Provider  Medicare A & B, [facility]    Diagnosis Date  2023-09-27    Current Treatment  Refer to     Requested Clinic or Service  Plastic Surgery    Treatment Needed  Refer to  plastic surgery for facial lesion    Primary Diagnosis  L98.9    Onset of Symptoms and Course of Illness  Refer to     Medications and/or Treatment  Refer to

## 2024-01-03 DIAGNOSIS — K21.9 GASTROESOPHAGEAL REFLUX DISEASE WITHOUT ESOPHAGITIS: ICD-10-CM

## 2024-01-03 RX ORDER — LANSOPRAZOLE 30 MG/1
30 CAPSULE, DELAYED RELEASE ORAL 2 TIMES DAILY
Qty: 180 CAPSULE | Refills: 0 | Status: SHIPPED | OUTPATIENT
Start: 2024-01-03

## 2024-01-03 NOTE — TELEPHONE ENCOUNTER
Caller: Tessa Fisher    Relationship: Self    Best call back number: [phone]     Requested Prescriptions:   Requested Prescriptions     Pending Prescriptions Disp Refills    lansoprazole (PREVACID) 30 MG capsule 180 capsule 1     Sig: Take 1 capsule by mouth 2 (Two) Times a Day.        Pharmacy where request should be sent: [facility] [address] - [phone]  - [phone]     Last office visit with prescribing clinician: 8/9/2023   Last telemedicine visit with prescribing clinician: Visit date not found   Next office visit with prescribing clinician: 8/12/2024     Additional details provided by patient:     Does the patient have less than a 3 day supply:  [x] Yes  [] No    Would you like a call back once the refill request has been completed: [x] Yes [] No    If the office needs to give you a call back, can they leave a voicemail: [x] Yes [] No    Polina Ramires Rep   01/03/24 11:23 EST

## 2024-04-08 ENCOUNTER — TELEPHONE (OUTPATIENT)
Dept: FAMILY MEDICINE CLINIC | Facility: CLINIC | Age: 84
End: 2024-04-08
Payer: MEDICARE

## 2024-04-08 RX ORDER — FAMOTIDINE 40 MG/1
40 TABLET, FILM COATED ORAL DAILY
Qty: 30 TABLET | Refills: 2 | Status: SHIPPED | OUTPATIENT
Start: 2024-04-08

## 2024-04-08 NOTE — TELEPHONE ENCOUNTER
Caller: Kamila Alba    Relationship: Emergency Contact    Best call back number: [phone]     Requested Prescriptions: famotidine (PEPCID) 40 MG tablet       Pharmacy where request should be sent:  [facility] [place] [address] [facility] - [phone]  - [phone]     Last office visit with prescribing clinician: 8/9/2023   Last telemedicine visit with prescribing clinician: Visit date not found   Next office visit with prescribing clinician: 8/12/2024     Additional details provided by patient: PATIENT IS NOT ABLE TO TAKE THE PROTONIX BECAUSE IT'S NOT WORKING.     Does the patient have less than a 3 day supply:  [x] Yes  [] No    Would you like a call back once the refill request has been completed: [x] Yes [] No    If the office needs to give you a call back, can they leave a voicemail: [x] Yes [] No    Polina Boss Rep   04/08/24 12:27 EDT

## 2024-05-15 ENCOUNTER — CLINICAL SUPPORT NO REQUIREMENTS (OUTPATIENT)
Dept: CARDIOLOGY | Facility: CLINIC | Age: 84
End: 2024-05-15
Payer: MEDICARE

## 2024-05-15 DIAGNOSIS — Z95.0 CARDIAC PACEMAKER IN SITU: Primary | ICD-10-CM

## 2024-05-30 ENCOUNTER — OFFICE VISIT (OUTPATIENT)
Dept: CARDIOLOGY | Facility: CLINIC | Age: 84
End: 2024-05-30
Payer: MEDICARE

## 2024-05-30 VITALS
SYSTOLIC BLOOD PRESSURE: 139 MMHG | OXYGEN SATURATION: 99 % | HEIGHT: 66 IN | WEIGHT: 168.8 LBS | BODY MASS INDEX: 27.13 KG/M2 | HEART RATE: 65 BPM | DIASTOLIC BLOOD PRESSURE: 83 MMHG

## 2024-05-30 DIAGNOSIS — R00.1 SYMPTOMATIC BRADYCARDIA: ICD-10-CM

## 2024-05-30 DIAGNOSIS — I44.1 SECOND DEGREE AV BLOCK, MOBITZ TYPE II: Primary | ICD-10-CM

## 2024-05-30 PROCEDURE — 99214 OFFICE O/P EST MOD 30 MIN: CPT | Performed by: PHYSICIAN ASSISTANT

## 2024-05-30 PROCEDURE — 93000 ELECTROCARDIOGRAM COMPLETE: CPT | Performed by: PHYSICIAN ASSISTANT

## 2024-05-30 NOTE — PROGRESS NOTES
Venra Moran APRN  Tessa Fisher  1940 05/30/2024    Patient Active Problem List   Diagnosis    Gastroesophageal reflux disease without esophagitis    Bilateral leg pain    Generalized weakness    Near syncope    Screening    Syncope    Symptomatic bradycardia    Former smoker    History of pneumothorax    Pacemaker    Second degree AV block, Mobitz type II, status post permanent pacemaker implantation with a [employer] device on 4/3/18.    Precordial pain    Bilateral leg edema    Skin lesion       Dear Verna Moran APRN:    Subjective     History of Present Illness:    Chief Complaint   Patient presents with    Follow-up     routine       Tessa Fisher is a pleasant 83 y.o. female with a past medical history significant for second-degree type II AV block status post dual-chamber pacemaker implantation with a [employer] device on April 3, 2018.  She comes in today for cardiology follow-up.     Has no complaints from cardiac standpoint.  She denies any chest pains or shortness of breath from baseline.  She reports she is still able to perform all ADLs without limitations she has recently been weed eating and push mowing without issue.  She does still have some chronic pedal edema but denies any recent worsening of this.  She denies orthopnea or PND.    No Known Allergies:      Current Outpatient Medications:     famotidine (Pepcid) 40 MG tablet, Take 1 tablet by mouth Daily., Disp: 30 tablet, Rfl: 2    The following portions of the patient's history were reviewed and updated as appropriate: allergies, current medications, past family history, past medical history, past social history, past surgical history and problem list.    Social History     Tobacco Use    Smoking status: Former     Current packs/day: 0.00     Average packs/day: 3.0 packs/day for 25.0 years (75.0 ttl pk-yrs)     Types: Cigarettes     Start date: 1/1/1950     Quit date: 1/1/1975     Years since quitting:  "49.4    Smokeless tobacco: Never   Vaping Use    Vaping status: Never Used   Substance Use Topics    Alcohol use: No    Drug use: No         Objective   Vitals:    05/30/24 1359   BP: 139/83   Pulse: 65   SpO2: 99%   Weight: 76.6 kg (168 lb 12.8 oz)   Height: 167.6 cm (66\")     Body mass index is 27.25 kg/m².    ROS    Constitutional:       General: Not in acute distress.     Appearance: Healthy appearance. Well-developed and not in distress. Not diaphoretic.   Eyes:      Conjunctiva/sclera: Conjunctivae normal.      Pupils: Pupils are equal, round, and reactive to light.   HENT:      Head: Normocephalic and atraumatic.   Neck:      Vascular: No carotid bruit or JVD.   Pulmonary:      Effort: Pulmonary effort is normal. No respiratory distress.      Breath sounds: Normal breath sounds.   Cardiovascular:      Normal rate. Regular rhythm.   Edema:     Peripheral edema absent.   Skin:     General: Skin is cool.   Neurological:      Mental Status: Alert, oriented to person, place, and time and oriented to person, place and time.         Lab Results   Component Value Date     08/07/2023    K 5.4 (H) 08/07/2023     08/07/2023    CO2 26.6 08/07/2023    BUN 11 08/07/2023    CREATININE 0.82 08/07/2023    GLUCOSE 93 08/07/2023    CALCIUM 10.0 08/07/2023    AST 20 08/07/2023    ALT 18 08/07/2023    ALKPHOS 85 08/07/2023    LABIL2 1.5 05/29/2015     Lab Results   Component Value Date    CKTOTAL 49 04/02/2018     Lab Results   Component Value Date    WBC 4.85 08/07/2023    HGB 13.7 08/07/2023    HCT 41.5 08/07/2023     08/07/2023     Lab Results   Component Value Date    INR 1.06 04/04/2018    INR 0.94 04/16/2017     Lab Results   Component Value Date    MG 2.0 08/17/2021     Lab Results   Component Value Date    TSH 3.420 08/07/2023    TRIG 76 08/07/2023    HDL 56 08/07/2023     (H) 08/07/2023      Lab Results   Component Value Date    BNP 45.0 04/01/2018       During this visit the following were " done:  Labs Reviewed []    Labs Ordered []    Radiology Reports Reviewed []    Radiology Ordered []    PCP Records Reviewed []    Referring Provider Records Reviewed []    ER Records Reviewed []    Hospital Records Reviewed []    History Obtained From Family []    Radiology Images Reviewed []    Other Reviewed []    Records Requested []         ECG 12 Lead    Date/Time: 5/30/2024 2:35 PM  Performed by: Lc Mackey PA-C    Authorized by: Lc Mackey PA-C  Comparison: compared with previous ECG   Similar to previous ECG  Rhythm: sinus rhythm  ST Segments: ST segments normal  Pacing: ventricular paced rhythm  Clinical impression: non-specific ECG          Assessment & Plan    Diagnosis Plan   1. Second degree AV block, Mobitz type II, status post permanent pacemaker implantation with a [employer] device on 4/3/18.        2. Symptomatic bradycardia                 Recommendations:  Second-degree AV block status post permanent pacemaker implantation  Recent interrogation was unremarkable 2 years of battery life left we will continue with routine interrogations  Chronic pedal edema  Stable unchanged she denies any recent dyspnea.    Return in about 1 year (around 5/30/2025).    As always, I appreciate very much the opportunity to participate in the cardiovascular care of your patients.      With Best Regards,    Lc Mackey PA-C

## 2024-08-05 ENCOUNTER — TELEPHONE (OUTPATIENT)
Dept: FAMILY MEDICINE CLINIC | Facility: CLINIC | Age: 84
End: 2024-08-05
Payer: MEDICARE

## 2024-08-05 NOTE — TELEPHONE ENCOUNTER
Caller: Kamila Alba    Relationship: Emergency Contact    Best call back number: [phone]     What orders are you requesting (i.e. lab or imaging): ROUTINE ANNUAL WELLNESS LABS    In what timeframe would the patient need to come in: ASAP, REQUESTING WEDNESDAY 08.07.24    Where will you receive your lab/imaging services: [facility]    Additional notes: PLEASE CALL PATIENT WHEN ORDER HAVE BEEN PLACED SO SHE CAN WALK-IN.

## 2024-08-05 NOTE — TELEPHONE ENCOUNTER
If she is coming for a medicare wellness. I can't put the orders in for labs ahead of time. They will have to be ordered at the wellness in order to be covered.

## 2024-08-12 ENCOUNTER — OFFICE VISIT (OUTPATIENT)
Dept: FAMILY MEDICINE CLINIC | Facility: CLINIC | Age: 84
End: 2024-08-12
Payer: MEDICARE

## 2024-08-12 ENCOUNTER — LAB (OUTPATIENT)
Dept: FAMILY MEDICINE CLINIC | Facility: CLINIC | Age: 84
End: 2024-08-12
Payer: MEDICARE

## 2024-08-12 VITALS
HEIGHT: 66 IN | SYSTOLIC BLOOD PRESSURE: 130 MMHG | WEIGHT: 168.6 LBS | BODY MASS INDEX: 27.1 KG/M2 | DIASTOLIC BLOOD PRESSURE: 80 MMHG | TEMPERATURE: 98.2 F | HEART RATE: 71 BPM | OXYGEN SATURATION: 99 %

## 2024-08-12 DIAGNOSIS — Z00.00 MEDICARE ANNUAL WELLNESS VISIT, SUBSEQUENT: Primary | ICD-10-CM

## 2024-08-12 DIAGNOSIS — Z00.00 MEDICARE ANNUAL WELLNESS VISIT, SUBSEQUENT: ICD-10-CM

## 2024-08-12 PROCEDURE — 85025 COMPLETE CBC W/AUTO DIFF WBC: CPT | Performed by: FAMILY MEDICINE

## 2024-08-12 PROCEDURE — 80061 LIPID PANEL: CPT | Performed by: FAMILY MEDICINE

## 2024-08-12 PROCEDURE — 83036 HEMOGLOBIN GLYCOSYLATED A1C: CPT | Performed by: FAMILY MEDICINE

## 2024-08-12 PROCEDURE — 1126F AMNT PAIN NOTED NONE PRSNT: CPT | Performed by: FAMILY MEDICINE

## 2024-08-12 PROCEDURE — 84439 ASSAY OF FREE THYROXINE: CPT | Performed by: FAMILY MEDICINE

## 2024-08-12 PROCEDURE — 1170F FXNL STATUS ASSESSED: CPT | Performed by: FAMILY MEDICINE

## 2024-08-12 PROCEDURE — 36415 COLL VENOUS BLD VENIPUNCTURE: CPT

## 2024-08-12 PROCEDURE — G0439 PPPS, SUBSEQ VISIT: HCPCS | Performed by: FAMILY MEDICINE

## 2024-08-12 PROCEDURE — 84443 ASSAY THYROID STIM HORMONE: CPT | Performed by: FAMILY MEDICINE

## 2024-08-12 PROCEDURE — 1160F RVW MEDS BY RX/DR IN RCRD: CPT | Performed by: FAMILY MEDICINE

## 2024-08-12 PROCEDURE — 80053 COMPREHEN METABOLIC PANEL: CPT | Performed by: FAMILY MEDICINE

## 2024-08-12 PROCEDURE — 1159F MED LIST DOCD IN RCRD: CPT | Performed by: FAMILY MEDICINE

## 2024-08-12 RX ORDER — OMEPRAZOLE 40 MG/1
40 CAPSULE, DELAYED RELEASE ORAL DAILY
Qty: 30 CAPSULE | Refills: 2 | Status: SHIPPED | OUTPATIENT
Start: 2024-08-12 | End: 2024-08-12

## 2024-08-12 RX ORDER — BUMETANIDE 1 MG/1
1 TABLET ORAL DAILY
Qty: 30 TABLET | Refills: 2 | Status: SHIPPED | OUTPATIENT
Start: 2024-08-12 | End: 2024-08-12 | Stop reason: SDUPTHER

## 2024-08-12 RX ORDER — OMEPRAZOLE 20 MG/1
20 CAPSULE, DELAYED RELEASE ORAL 2 TIMES DAILY
Qty: 120 CAPSULE | Refills: 2 | Status: SHIPPED | OUTPATIENT
Start: 2024-08-12

## 2024-08-12 RX ORDER — BUMETANIDE 1 MG/1
1 TABLET ORAL DAILY
Qty: 90 TABLET | Refills: 2 | Status: SHIPPED | OUTPATIENT
Start: 2024-08-12

## 2024-08-12 RX ORDER — LEVOCETIRIZINE DIHYDROCHLORIDE 5 MG/1
5 TABLET, FILM COATED ORAL EVERY EVENING
Qty: 30 TABLET | Refills: 2 | Status: SHIPPED | OUTPATIENT
Start: 2024-08-12

## 2024-08-12 NOTE — PROGRESS NOTES
Subjective   The ABCs of the Annual Wellness Visit  Medicare Wellness Visit      Tessa Fisher is a 83 y.o. patient who presents for a Medicare Wellness Visit.    The following portions of the patient's history were reviewed and   updated as appropriate: allergies, current medications, past family history, past medical history, past social history, past surgical history, and problem list.    Compared to one year ago, the patient's physical   health is the same.  Compared to one year ago, the patient's mental   health is the same.    Recent Hospitalizations:  She was not admitted to the hospital during the last year.     Current Medical Providers:  Patient Care Team:  Verna Moran APRN as PCP - General (Nurse Practitioner)    Outpatient Medications Prior to Visit   Medication Sig Dispense Refill   • famotidine (Pepcid) 40 MG tablet Take 1 tablet by mouth Daily. 30 tablet 2     No facility-administered medications prior to visit.     No opioid medication identified on active medication list. I have reviewed chart for other potential  high risk medication/s and harmful drug interactions in the elderly.      Aspirin is not on active medication list.  Aspirin use is not indicated based on review of current medical condition/s. Risk of harm outweighs potential benefits.  .    Patient Active Problem List   Diagnosis   • Gastroesophageal reflux disease without esophagitis   • Bilateral leg pain   • Generalized weakness   • Near syncope   • Screening   • Syncope   • Symptomatic bradycardia   • Former smoker   • History of pneumothorax   • Pacemaker   • Second degree AV block, Mobitz type II, status post permanent pacemaker implantation with a [employer] device on 4/3/18.   • Precordial pain   • Bilateral leg edema   • Skin lesion     Advance Care Planning Advance Directive is not on file.  ACP discussion was declined by the patient. Patient does not have an advance directive, information provided.   "          Objective   Vitals:    24 1326   BP: 130/80   BP Location: Right arm   Patient Position: Sitting   Cuff Size: Adult   Pulse: 71   Temp: 98.2 °F (36.8 °C)   TempSrc: Temporal   SpO2: 99%   Weight: 76.5 kg (168 lb 9.6 oz)   Height: 167.6 cm (66\")   PainSc: 0-No pain       Estimated body mass index is 27.21 kg/m² as calculated from the following:    Height as of this encounter: 167.6 cm (66\").    Weight as of this encounter: 76.5 kg (168 lb 9.6 oz).    BMI is >= 25 and <30. (Overweight) The following options were offered after discussion;: nutrition counseling/recommendations       Does the patient have evidence of cognitive impairment? No                                                                                               Health  Risk Assessment    Smoking Status:  Social History     Tobacco Use   Smoking Status Former   • Current packs/day: 0.00   • Average packs/day: 3.0 packs/day for 25.0 years (75.0 ttl pk-yrs)   • Types: Cigarettes   • Start date: 1950   • Quit date: 1975   • Years since quittin.6   Smokeless Tobacco Never     Alcohol Consumption:  Social History     Substance and Sexual Activity   Alcohol Use No       Fall Risk Screen  STEADI Fall Risk Assessment was completed, and patient is at LOW risk for falls.Assessment completed on:2024    Depression Screenin/12/2024     1:32 PM   PHQ-2/PHQ-9 Depression Screening   Little Interest or Pleasure in Doing Things 0-->not at all   Feeling Down, Depressed or Hopeless 0-->not at all   PHQ-9: Brief Depression Severity Measure Score 0     Health Habits and Functional and Cognitive Screenin/12/2024     1:29 PM   Functional & Cognitive Status   Do you have difficulty preparing food and eating? No   Do you have difficulty bathing yourself, getting dressed or grooming yourself? No   Do you have difficulty using the toilet? No   Do you have difficulty moving around from place to place? No   Do you have trouble " with steps or getting out of a bed or a chair? No   Current Diet Well Balanced Diet   Dental Exam Up to date   Eye Exam Up to date   Exercise (times per week) 7 times per week   Current Exercises Include Yard Work   Do you need help using the phone?  No   Are you deaf or do you have serious difficulty hearing?  No   Do you need help to go to places out of walking distance? No   Do you need help shopping? No   Do you need help preparing meals?  No   Do you need help with housework?  No   Do you need help with laundry? No   Do you need help taking your medications? No   Do you need help managing money? No   Do you ever drive or ride in a car without wearing a seat belt? No   Have you felt unusual stress, anger or loneliness in the last month? No   Who do you live with? Alone   If you need help, do you have trouble finding someone available to you? No   Have you been bothered in the last four weeks by sexual problems? No   Do you have difficulty concentrating, remembering or making decisions? No           Age-appropriate Screening Schedule:  Refer to the list below for future screening recommendations based on patient's age, sex and/or medical conditions. Orders for these recommended tests are listed in the plan section. The patient has been provided with a written plan.    Health Maintenance List  Health Maintenance   Topic Date Due   • DXA SCAN  Never done   • TDAP/TD VACCINES (1 - Tdap) Never done   • RSV Vaccine - Adults (1 - 1-dose 60+ series) Never done   • Pneumococcal Vaccine 65+ (1 of 1 - PCV) Never done   • ZOSTER VACCINE (2 of 2) 06/08/2018   • ANNUAL WELLNESS VISIT  08/09/2024   • BMI FOLLOWUP  08/09/2024   • INFLUENZA VACCINE  08/01/2024   • COLORECTAL CANCER SCREENING  03/04/2030   • COVID-19 Vaccine  Discontinued                                                                                                                                                CMS Preventative Services Quick Reference  Risk  Factors Identified During Encounter  None Identified    The above risks/problems have been discussed with the patient.  Pertinent information has been shared with the patient in the After Visit Summary.  An After Visit Summary and PPPS were made available to the patient.    Follow Up:   Next Medicare Wellness visit to be scheduled in 1 year.     Assessment & Plan               Follow Up:   No follow-ups on file.

## 2024-08-13 ENCOUNTER — TELEPHONE (OUTPATIENT)
Dept: FAMILY MEDICINE CLINIC | Facility: CLINIC | Age: 84
End: 2024-08-13
Payer: MEDICARE

## 2024-08-13 LAB
ALBUMIN SERPL-MCNC: 4.3 G/DL (ref 3.5–5.2)
ALBUMIN/GLOB SERPL: 1.7 G/DL
ALP SERPL-CCNC: 87 U/L (ref 39–117)
ALT SERPL W P-5'-P-CCNC: 19 U/L (ref 1–33)
ANION GAP SERPL CALCULATED.3IONS-SCNC: 11.4 MMOL/L (ref 5–15)
AST SERPL-CCNC: 24 U/L (ref 1–32)
BASOPHILS # BLD AUTO: 0.06 10*3/MM3 (ref 0–0.2)
BASOPHILS NFR BLD AUTO: 1.1 % (ref 0–1.5)
BILIRUB SERPL-MCNC: 0.4 MG/DL (ref 0–1.2)
BUN SERPL-MCNC: 8 MG/DL (ref 8–23)
BUN/CREAT SERPL: 12.1 (ref 7–25)
CALCIUM SPEC-SCNC: 9.7 MG/DL (ref 8.6–10.5)
CHLORIDE SERPL-SCNC: 102 MMOL/L (ref 98–107)
CHOLEST SERPL-MCNC: 183 MG/DL (ref 0–200)
CO2 SERPL-SCNC: 23.6 MMOL/L (ref 22–29)
CREAT SERPL-MCNC: 0.66 MG/DL (ref 0.57–1)
DEPRECATED RDW RBC AUTO: 40.7 FL (ref 37–54)
EGFRCR SERPLBLD CKD-EPI 2021: 87.7 ML/MIN/1.73
EOSINOPHIL # BLD AUTO: 0.17 10*3/MM3 (ref 0–0.4)
EOSINOPHIL NFR BLD AUTO: 3.1 % (ref 0.3–6.2)
ERYTHROCYTE [DISTWIDTH] IN BLOOD BY AUTOMATED COUNT: 12.5 % (ref 12.3–15.4)
GLOBULIN UR ELPH-MCNC: 2.6 GM/DL
GLUCOSE SERPL-MCNC: 91 MG/DL (ref 65–99)
HBA1C MFR BLD: 5.8 % (ref 4.8–5.6)
HCT VFR BLD AUTO: 41.1 % (ref 34–46.6)
HDLC SERPL-MCNC: 58 MG/DL (ref 40–60)
HGB BLD-MCNC: 13.9 G/DL (ref 12–15.9)
IMM GRANULOCYTES # BLD AUTO: 0.03 10*3/MM3 (ref 0–0.05)
IMM GRANULOCYTES NFR BLD AUTO: 0.5 % (ref 0–0.5)
LDLC SERPL CALC-MCNC: 112 MG/DL (ref 0–100)
LDLC/HDLC SERPL: 1.92 {RATIO}
LYMPHOCYTES # BLD AUTO: 1.22 10*3/MM3 (ref 0.7–3.1)
LYMPHOCYTES NFR BLD AUTO: 22.1 % (ref 19.6–45.3)
MCH RBC QN AUTO: 30.7 PG (ref 26.6–33)
MCHC RBC AUTO-ENTMCNC: 33.8 G/DL (ref 31.5–35.7)
MCV RBC AUTO: 90.7 FL (ref 79–97)
MONOCYTES # BLD AUTO: 0.5 10*3/MM3 (ref 0.1–0.9)
MONOCYTES NFR BLD AUTO: 9.1 % (ref 5–12)
NEUTROPHILS NFR BLD AUTO: 3.53 10*3/MM3 (ref 1.7–7)
NEUTROPHILS NFR BLD AUTO: 64.1 % (ref 42.7–76)
NRBC BLD AUTO-RTO: 0 /100 WBC (ref 0–0.2)
PLATELET # BLD AUTO: 251 10*3/MM3 (ref 140–450)
PMV BLD AUTO: 10.5 FL (ref 6–12)
POTASSIUM SERPL-SCNC: 4.6 MMOL/L (ref 3.5–5.2)
PROT SERPL-MCNC: 6.9 G/DL (ref 6–8.5)
RBC # BLD AUTO: 4.53 10*6/MM3 (ref 3.77–5.28)
SODIUM SERPL-SCNC: 137 MMOL/L (ref 136–145)
T4 FREE SERPL-MCNC: 0.88 NG/DL (ref 0.92–1.68)
TRIGL SERPL-MCNC: 69 MG/DL (ref 0–150)
TSH SERPL DL<=0.05 MIU/L-ACNC: 3.62 UIU/ML (ref 0.27–4.2)
VLDLC SERPL-MCNC: 13 MG/DL (ref 5–40)
WBC NRBC COR # BLD AUTO: 5.51 10*3/MM3 (ref 3.4–10.8)

## 2024-08-13 NOTE — TELEPHONE ENCOUNTER
----- Message from Verna Moran sent at 8/13/2024  2:27 PM EDT -----  Please let patient know results are stable. Thank you.

## 2024-09-04 ENCOUNTER — PATIENT OUTREACH (OUTPATIENT)
Dept: FAMILY MEDICINE CLINIC | Facility: CLINIC | Age: 84
End: 2024-09-04
Payer: MEDICARE

## 2024-11-04 PROCEDURE — 93294 REM INTERROG EVL PM/LDLS PM: CPT | Performed by: INTERNAL MEDICINE

## 2024-11-04 PROCEDURE — 93296 REM INTERROG EVL PM/IDS: CPT | Performed by: INTERNAL MEDICINE

## 2024-11-20 ENCOUNTER — CLINICAL SUPPORT NO REQUIREMENTS (OUTPATIENT)
Dept: CARDIOLOGY | Facility: CLINIC | Age: 84
End: 2024-11-20
Payer: MEDICARE

## 2024-11-20 DIAGNOSIS — Z95.0 CARDIAC PACEMAKER IN SITU: Primary | ICD-10-CM

## 2025-04-02 ENCOUNTER — CLINICAL SUPPORT NO REQUIREMENTS (OUTPATIENT)
Dept: CARDIOLOGY | Facility: CLINIC | Age: 85
End: 2025-04-02
Payer: MEDICARE

## 2025-04-02 DIAGNOSIS — Z95.0 CARDIAC PACEMAKER IN SITU: Primary | ICD-10-CM

## 2025-04-03 RX ORDER — BUMETANIDE 1 MG/1
1 TABLET ORAL DAILY
Qty: 30 TABLET | Refills: 2 | Status: SHIPPED | OUTPATIENT
Start: 2025-04-03

## 2025-04-03 RX ORDER — LEVOCETIRIZINE DIHYDROCHLORIDE 5 MG/1
5 TABLET, FILM COATED ORAL EVERY EVENING
Qty: 30 TABLET | Refills: 2 | Status: SHIPPED | OUTPATIENT
Start: 2025-04-03

## 2025-05-05 RX ORDER — OMEPRAZOLE 20 MG/1
20 CAPSULE, DELAYED RELEASE ORAL 2 TIMES DAILY
Qty: 120 CAPSULE | Refills: 2 | Status: SHIPPED | OUTPATIENT
Start: 2025-05-05

## 2025-05-28 ENCOUNTER — OFFICE VISIT (OUTPATIENT)
Dept: CARDIOLOGY | Facility: CLINIC | Age: 85
End: 2025-05-28
Payer: MEDICARE

## 2025-05-28 VITALS
WEIGHT: 168.4 LBS | OXYGEN SATURATION: 97 % | HEART RATE: 79 BPM | DIASTOLIC BLOOD PRESSURE: 85 MMHG | HEIGHT: 66 IN | SYSTOLIC BLOOD PRESSURE: 132 MMHG | BODY MASS INDEX: 27.06 KG/M2 | RESPIRATION RATE: 16 BRPM

## 2025-05-28 DIAGNOSIS — I44.1 SECOND DEGREE AV BLOCK, MOBITZ TYPE II: Primary | ICD-10-CM

## 2025-05-28 PROCEDURE — 93000 ELECTROCARDIOGRAM COMPLETE: CPT | Performed by: PHYSICIAN ASSISTANT

## 2025-05-28 PROCEDURE — 99214 OFFICE O/P EST MOD 30 MIN: CPT | Performed by: PHYSICIAN ASSISTANT

## 2025-05-28 NOTE — PROGRESS NOTES
Verna Moran APRN  Tessa Fisher  1940 05/28/2025    Patient Active Problem List   Diagnosis    Gastroesophageal reflux disease without esophagitis    Bilateral leg pain    Generalized weakness    Near syncope    Screening    Syncope    Symptomatic bradycardia    Former smoker    History of pneumothorax    Pacemaker    Second degree AV block, Mobitz type II, status post permanent pacemaker implantation with a [employer] device on 4/3/18.    Precordial pain    Bilateral leg edema    Skin lesion       Dear Verna Moran APRN:    Subjective     History of Present Illness:    Chief Complaint   Patient presents with    Follow-up     1 year    Med Management     verbal       Tessa Fisher is a pleasant 84 y.o. female with a past medical history significant for second-degree type II AV block status post dual-chamber pacemaker implantation with a [employer] device on April 3, 2018.  She comes in today for cardiology follow-up.     Tessa reports she has been doing excellent she still mows and weed eats her own lawn she has no limitations from cardiac standpoint.  She denies any chest pains, shortness of breath, orthopnea, or PND.  She also denies any palpitations or syncope.  Blood pressure is well-controlled.      No Known Allergies:      Current Outpatient Medications:     bumetanide (BUMEX) 1 MG tablet, TAKE ONE TABLET BY MOUTH EVERY DAY, Disp: 30 tablet, Rfl: 2    omeprazole (priLOSEC) 20 MG capsule, TAKE ONE CAPSULE BY MOUTH TWICE DAILY, Disp: 120 capsule, Rfl: 2    famotidine (Pepcid) 40 MG tablet, Take 1 tablet by mouth Daily., Disp: 30 tablet, Rfl: 2    levocetirizine (XYZAL) 5 MG tablet, TAKE ONE TABLET BY MOUTH EVERY EVENING, Disp: 30 tablet, Rfl: 2    The following portions of the patient's history were reviewed and updated as appropriate: allergies, current medications, past family history, past medical history, past social history, past surgical history and problem  "list.    Social History     Tobacco Use    Smoking status: Former     Current packs/day: 0.00     Average packs/day: 3.0 packs/day for 25.0 years (75.0 ttl pk-yrs)     Types: Cigarettes     Start date: 1950     Quit date: 1975     Years since quittin.4    Smokeless tobacco: Never   Vaping Use    Vaping status: Never Used   Substance Use Topics    Alcohol use: No    Drug use: No         Objective   Vitals:    25 1513   BP: 132/85   Pulse: 79   Resp: 16   SpO2: 97%   Weight: 76.4 kg (168 lb 6.4 oz)   Height: 167.6 cm (66\")     Body mass index is 27.18 kg/m².    ROS    Constitutional:       General: Not in acute distress.     Appearance: Healthy appearance. Well-developed and not in distress. Not diaphoretic.   Eyes:      Conjunctiva/sclera: Conjunctivae normal.      Pupils: Pupils are equal, round, and reactive to light.   HENT:      Head: Normocephalic and atraumatic.   Neck:      Vascular: No carotid bruit or JVD.   Pulmonary:      Effort: Pulmonary effort is normal. No respiratory distress.      Breath sounds: Normal breath sounds.   Cardiovascular:      Normal rate. Regular rhythm.   Edema:     Pretibial: bilateral 2+ edema of the pretibial area.     Ankle: bilateral 2+ edema of the ankle.     Feet: bilateral 2+ edema of the feet.  Skin:     General: Skin is cool.   Neurological:      Mental Status: Alert, oriented to person, place, and time and oriented to person, place and time.         Lab Results   Component Value Date     2024    K 4.6 2024     2024    CO2 23.6 2024    BUN 8 2024    CREATININE 0.66 2024    GLUCOSE 91 2024    CALCIUM 9.7 2024    AST 24 2024    ALT 19 2024    ALKPHOS 87 2024     Lab Results   Component Value Date    CKTOTAL 49 2018     Lab Results   Component Value Date    WBC 5.51 2024    HGB 13.9 2024    HCT 41.1 2024     2024     Lab Results   Component Value " Date    INR 1.06 04/04/2018    INR 0.94 04/16/2017     Lab Results   Component Value Date    MG 2.0 08/17/2021     Lab Results   Component Value Date    TSH 3.620 08/12/2024    TRIG 69 08/12/2024    HDL 58 08/12/2024     (H) 08/12/2024      Lab Results   Component Value Date    BNP 45.0 04/01/2018       During this visit the following were done:  Labs Reviewed []    Labs Ordered []    Radiology Reports Reviewed []    Radiology Ordered []    PCP Records Reviewed []    Referring Provider Records Reviewed []    ER Records Reviewed []    Hospital Records Reviewed []    History Obtained From Family []    Radiology Images Reviewed []    Other Reviewed []    Records Requested []         ECG 12 Lead    Date/Time: 5/28/2025 3:33 PM  Performed by: Lc Mackey PA-C    Authorized by: Lc Mackey PA-C  Comparison: compared with previous ECG   Similar to previous ECG  Rhythm: sinus rhythm  Conduction: conduction normal  Pacing: ventricular pacing and 100% capture  Clinical impression: abnormal EKG          Assessment & Plan    Diagnosis Plan   1. Second degree AV block, Mobitz type II, status post permanent pacemaker implantation with a [employer] device on 4/3/18.                 Recommendations:  AV block status post pacemaker implantation in 2018  Doing excellent from cardiac standpoint no limitations to ADLs continue with routine interrogations.  Chronic pedal edema  This is unchanged she only takes Bumex as needed often only half tablet.  I explained to her that this is perfectly fine.  I did encourage regular compression sock use.  She denies any symptoms that concern me for heart failure such as orthopnea, dyspnea, or PND.    No follow-ups on file.    As always, I appreciate very much the opportunity to participate in the cardiovascular care of your patients.      With Best Regards,    Lc Mackey PA-C

## 2025-08-11 ENCOUNTER — LAB (OUTPATIENT)
Dept: FAMILY MEDICINE CLINIC | Facility: CLINIC | Age: 85
End: 2025-08-11
Payer: MEDICARE

## 2025-08-11 ENCOUNTER — OFFICE VISIT (OUTPATIENT)
Dept: FAMILY MEDICINE CLINIC | Facility: CLINIC | Age: 85
End: 2025-08-11
Payer: MEDICARE

## 2025-08-11 VITALS
HEART RATE: 84 BPM | HEIGHT: 66 IN | SYSTOLIC BLOOD PRESSURE: 138 MMHG | WEIGHT: 166.4 LBS | BODY MASS INDEX: 26.74 KG/M2 | OXYGEN SATURATION: 97 % | TEMPERATURE: 96.8 F | DIASTOLIC BLOOD PRESSURE: 82 MMHG

## 2025-08-11 DIAGNOSIS — Z00.00 ANNUAL PHYSICAL EXAM: Primary | ICD-10-CM

## 2025-08-11 DIAGNOSIS — Z00.00 ANNUAL PHYSICAL EXAM: ICD-10-CM

## 2025-08-11 PROCEDURE — 83036 HEMOGLOBIN GLYCOSYLATED A1C: CPT | Performed by: FAMILY MEDICINE

## 2025-08-11 PROCEDURE — 80053 COMPREHEN METABOLIC PANEL: CPT | Performed by: FAMILY MEDICINE

## 2025-08-11 PROCEDURE — 84439 ASSAY OF FREE THYROXINE: CPT | Performed by: FAMILY MEDICINE

## 2025-08-11 PROCEDURE — 84443 ASSAY THYROID STIM HORMONE: CPT | Performed by: FAMILY MEDICINE

## 2025-08-11 PROCEDURE — 80061 LIPID PANEL: CPT | Performed by: FAMILY MEDICINE

## 2025-08-11 PROCEDURE — 85025 COMPLETE CBC W/AUTO DIFF WBC: CPT | Performed by: FAMILY MEDICINE

## 2025-08-11 PROCEDURE — 36415 COLL VENOUS BLD VENIPUNCTURE: CPT

## 2025-08-11 RX ORDER — FAMOTIDINE 40 MG/1
40 TABLET, FILM COATED ORAL DAILY
Qty: 90 TABLET | Refills: 3 | Status: SHIPPED | OUTPATIENT
Start: 2025-08-11 | End: 2025-08-11

## 2025-08-11 RX ORDER — FAMOTIDINE 40 MG/1
40 TABLET, FILM COATED ORAL DAILY
Qty: 90 TABLET | Refills: 3 | Status: SHIPPED | OUTPATIENT
Start: 2025-08-11

## 2025-08-11 RX ORDER — BUMETANIDE 1 MG/1
1 TABLET ORAL DAILY
Qty: 30 TABLET | Refills: 2 | Status: SHIPPED | OUTPATIENT
Start: 2025-08-11 | End: 2025-08-11

## 2025-08-11 RX ORDER — LEVOCETIRIZINE DIHYDROCHLORIDE 5 MG/1
5 TABLET, FILM COATED ORAL EVERY EVENING
Qty: 30 TABLET | Refills: 2 | Status: SHIPPED | OUTPATIENT
Start: 2025-08-11 | End: 2025-08-11

## 2025-08-11 RX ORDER — FUROSEMIDE 20 MG/1
20 TABLET ORAL DAILY
Qty: 90 TABLET | Refills: 0 | Status: SHIPPED | OUTPATIENT
Start: 2025-08-11

## 2025-08-11 RX ORDER — LEVOCETIRIZINE DIHYDROCHLORIDE 5 MG/1
5 TABLET, FILM COATED ORAL EVERY EVENING
Qty: 90 TABLET | Refills: 2 | Status: SHIPPED | OUTPATIENT
Start: 2025-08-11

## 2025-08-12 ENCOUNTER — RESULTS FOLLOW-UP (OUTPATIENT)
Dept: FAMILY MEDICINE CLINIC | Facility: CLINIC | Age: 85
End: 2025-08-12
Payer: MEDICARE

## 2025-08-12 LAB
ALBUMIN SERPL-MCNC: 4.3 G/DL (ref 3.5–5.2)
ALBUMIN/GLOB SERPL: 1.7 G/DL
ALP SERPL-CCNC: 87 U/L (ref 39–117)
ALT SERPL W P-5'-P-CCNC: 17 U/L (ref 1–33)
ANION GAP SERPL CALCULATED.3IONS-SCNC: 10.5 MMOL/L (ref 5–15)
AST SERPL-CCNC: 24 U/L (ref 1–32)
BASOPHILS # BLD AUTO: 0.08 10*3/MM3 (ref 0–0.2)
BASOPHILS NFR BLD AUTO: 1.2 % (ref 0–1.5)
BILIRUB SERPL-MCNC: 0.7 MG/DL (ref 0–1.2)
BUN SERPL-MCNC: 9 MG/DL (ref 8–23)
BUN/CREAT SERPL: 13 (ref 7–25)
CALCIUM SPEC-SCNC: 9.5 MG/DL (ref 8.6–10.5)
CHLORIDE SERPL-SCNC: 102 MMOL/L (ref 98–107)
CHOLEST SERPL-MCNC: 192 MG/DL (ref 0–200)
CO2 SERPL-SCNC: 23.5 MMOL/L (ref 22–29)
CREAT SERPL-MCNC: 0.69 MG/DL (ref 0.57–1)
DEPRECATED RDW RBC AUTO: 42.5 FL (ref 37–54)
EGFRCR SERPLBLD CKD-EPI 2021: 86.2 ML/MIN/1.73
EOSINOPHIL # BLD AUTO: 1.03 10*3/MM3 (ref 0–0.4)
EOSINOPHIL NFR BLD AUTO: 15.3 % (ref 0.3–6.2)
ERYTHROCYTE [DISTWIDTH] IN BLOOD BY AUTOMATED COUNT: 12.5 % (ref 12.3–15.4)
GLOBULIN UR ELPH-MCNC: 2.5 GM/DL
GLUCOSE SERPL-MCNC: 95 MG/DL (ref 65–99)
HBA1C MFR BLD: 5.7 % (ref 4.8–5.6)
HCT VFR BLD AUTO: 41.1 % (ref 34–46.6)
HDLC SERPL-MCNC: 60 MG/DL (ref 40–60)
HGB BLD-MCNC: 13.8 G/DL (ref 12–15.9)
IMM GRANULOCYTES # BLD AUTO: 0.02 10*3/MM3 (ref 0–0.05)
IMM GRANULOCYTES NFR BLD AUTO: 0.3 % (ref 0–0.5)
LDLC SERPL CALC-MCNC: 117 MG/DL (ref 0–100)
LDLC/HDLC SERPL: 1.93 {RATIO}
LYMPHOCYTES # BLD AUTO: 1.75 10*3/MM3 (ref 0.7–3.1)
LYMPHOCYTES NFR BLD AUTO: 26 % (ref 19.6–45.3)
MCH RBC QN AUTO: 31 PG (ref 26.6–33)
MCHC RBC AUTO-ENTMCNC: 33.6 G/DL (ref 31.5–35.7)
MCV RBC AUTO: 92.4 FL (ref 79–97)
MONOCYTES # BLD AUTO: 0.61 10*3/MM3 (ref 0.1–0.9)
MONOCYTES NFR BLD AUTO: 9.1 % (ref 5–12)
NEUTROPHILS NFR BLD AUTO: 3.23 10*3/MM3 (ref 1.7–7)
NEUTROPHILS NFR BLD AUTO: 48.1 % (ref 42.7–76)
NRBC BLD AUTO-RTO: 0 /100 WBC (ref 0–0.2)
PLATELET # BLD AUTO: 238 10*3/MM3 (ref 140–450)
PMV BLD AUTO: 10.4 FL (ref 6–12)
POTASSIUM SERPL-SCNC: 4.5 MMOL/L (ref 3.5–5.2)
PROT SERPL-MCNC: 6.8 G/DL (ref 6–8.5)
RBC # BLD AUTO: 4.45 10*6/MM3 (ref 3.77–5.28)
SODIUM SERPL-SCNC: 136 MMOL/L (ref 136–145)
T4 FREE SERPL-MCNC: 0.92 NG/DL (ref 0.92–1.68)
TRIGL SERPL-MCNC: 80 MG/DL (ref 0–150)
TSH SERPL DL<=0.05 MIU/L-ACNC: 2.42 UIU/ML (ref 0.27–4.2)
VLDLC SERPL-MCNC: 15 MG/DL (ref 5–40)
WBC NRBC COR # BLD AUTO: 6.72 10*3/MM3 (ref 3.4–10.8)

## 2025-08-18 LAB
MC_CV_MDC_IDC_RATE_1: 160
MC_CV_MDC_IDC_ZONE_ID: 1
MDC_IDC_MSMT_BATTERY_REMAINING_LONGEVITY: 12 MO
MDC_IDC_MSMT_BATTERY_REMAINING_PERCENTAGE: 24 %
MDC_IDC_MSMT_BATTERY_STATUS: NORMAL
MDC_IDC_MSMT_LEADCHNL_RA_DTM: NORMAL
MDC_IDC_MSMT_LEADCHNL_RA_IMPEDANCE_VALUE: 859
MDC_IDC_MSMT_LEADCHNL_RA_PACING_THRESHOLD_AMPLITUDE: 0.4
MDC_IDC_MSMT_LEADCHNL_RA_PACING_THRESHOLD_POLARITY: NORMAL
MDC_IDC_MSMT_LEADCHNL_RA_PACING_THRESHOLD_PULSEWIDTH: 0.4
MDC_IDC_MSMT_LEADCHNL_RA_SENSING_INTR_AMPL: 4.1
MDC_IDC_MSMT_LEADCHNL_RV_DTM: NORMAL
MDC_IDC_MSMT_LEADCHNL_RV_IMPEDANCE_VALUE: 792
MDC_IDC_MSMT_LEADCHNL_RV_PACING_THRESHOLD_AMPLITUDE: 1.3
MDC_IDC_MSMT_LEADCHNL_RV_PACING_THRESHOLD_POLARITY: NORMAL
MDC_IDC_MSMT_LEADCHNL_RV_PACING_THRESHOLD_PULSEWIDTH: 0.4
MDC_IDC_PG_IMPLANT_DTM: NORMAL
MDC_IDC_PG_MFG: NORMAL
MDC_IDC_PG_MODEL: NORMAL
MDC_IDC_PG_SERIAL: NORMAL
MDC_IDC_PG_TYPE: NORMAL
MDC_IDC_SESS_DTM: NORMAL
MDC_IDC_SESS_TYPE: NORMAL
MDC_IDC_SET_BRADY_AT_MODE_SWITCH_RATE: 170
MDC_IDC_SET_BRADY_LOWRATE: 60
MDC_IDC_SET_BRADY_MAX_SENSOR_RATE: 130
MDC_IDC_SET_BRADY_MAX_TRACKING_RATE: 130
MDC_IDC_SET_BRADY_MODE: NORMAL
MDC_IDC_SET_BRADY_PAV_DELAY: 220
MDC_IDC_SET_BRADY_SAV_DELAY: 205
MDC_IDC_SET_LEADCHNL_RA_PACING_AMPLITUDE: 2
MDC_IDC_SET_LEADCHNL_RA_PACING_POLARITY: NORMAL
MDC_IDC_SET_LEADCHNL_RA_PACING_PULSEWIDTH: 0.4
MDC_IDC_SET_LEADCHNL_RA_SENSING_POLARITY: NORMAL
MDC_IDC_SET_LEADCHNL_RA_SENSING_SENSITIVITY: 0.25
MDC_IDC_SET_LEADCHNL_RV_PACING_AMPLITUDE: 1.8
MDC_IDC_SET_LEADCHNL_RV_PACING_POLARITY: NORMAL
MDC_IDC_SET_LEADCHNL_RV_PACING_PULSEWIDTH: 0.4
MDC_IDC_SET_LEADCHNL_RV_SENSING_POLARITY: NORMAL
MDC_IDC_SET_LEADCHNL_RV_SENSING_SENSITIVITY: 0.6
MDC_IDC_SET_ZONE_STATUS: NORMAL
MDC_IDC_SET_ZONE_TYPE: NORMAL
MDC_IDC_STAT_AT_BURDEN_PERCENT: 0
MDC_IDC_STAT_BRADY_RA_PERCENT_PACED: 21
MDC_IDC_STAT_BRADY_RV_PERCENT_PACED: 100

## (undated) DEVICE — DRSNG SURESITE123 6X8IN

## (undated) DEVICE — Device: Brand: DEFENDO AIR/WATER/SUCTION AND BIOPSY VALVE

## (undated) DEVICE — ASMBL SPK CONTRST CONTRL

## (undated) DEVICE — KT MINI ACC MAK COAXL 5F 10CM

## (undated) DEVICE — PROXIMATE PLUS MD MULTI-DIRECTIONAL RELEASE SKIN STAPLERS CONTAINS 35 STAINLESS STEEL STAPLES APPROXIMATE CLOSED DIMENSIONS: 6.9MM X 3.9MM WIDE: Brand: PROXIMATE

## (undated) DEVICE — Device

## (undated) DEVICE — COR PACEMAKER: Brand: MEDLINE INDUSTRIES, INC.

## (undated) DEVICE — CONN Y IRR DISP 1P/U

## (undated) DEVICE — Device: Brand: ENDOGATOR

## (undated) DEVICE — GOWN,REINF,POLY,ECL,PP SLV,XL: Brand: MEDLINE

## (undated) DEVICE — PLASMABLADE PS200-040 4.0: Brand: PLASMABLADE™

## (undated) DEVICE — TUBING, SUCTION, 1/4" X 20', STRAIGHT: Brand: MEDLINE INDUSTRIES, INC.

## (undated) DEVICE — SYR LUERLOK 30CC

## (undated) DEVICE — SHEATH INTRO SUPERSHEATH JWIRE .035 5F 11CM